# Patient Record
Sex: FEMALE | Race: WHITE | NOT HISPANIC OR LATINO | URBAN - NONMETROPOLITAN AREA
[De-identification: names, ages, dates, MRNs, and addresses within clinical notes are randomized per-mention and may not be internally consistent; named-entity substitution may affect disease eponyms.]

---

## 2023-03-06 PROBLEM — K11.7 XEROSTOMIA: Status: ACTIVE | Noted: 2023-03-06

## 2023-03-06 PROBLEM — F17.210 CIGARETTE NICOTINE DEPENDENCE: Status: ACTIVE | Noted: 2023-03-06

## 2023-03-06 PROBLEM — R43.9 DISTURBANCE OF SMELL AND TASTE: Status: ACTIVE | Noted: 2023-03-06

## 2023-03-06 PROBLEM — H61.21 IMPACTED CERUMEN OF RIGHT EAR: Status: ACTIVE | Noted: 2023-03-06

## 2023-03-06 PROBLEM — J32.9 SINUSITIS: Status: ACTIVE | Noted: 2023-03-06

## 2023-03-06 PROBLEM — K58.9 IRRITABLE BOWEL SYNDROME: Status: ACTIVE | Noted: 2023-03-06

## 2023-03-06 PROBLEM — R63.6 UNDERWEIGHT: Status: ACTIVE | Noted: 2023-03-06

## 2023-03-06 PROBLEM — E55.9 VITAMIN D DEFICIENCY: Status: ACTIVE | Noted: 2023-03-06

## 2023-03-06 PROBLEM — Q67.5 CONGENITAL SCOLIOSIS: Status: ACTIVE | Noted: 2023-03-06

## 2023-03-06 PROBLEM — K52.9 CHRONIC COLITIS: Status: ACTIVE | Noted: 2023-03-06

## 2023-03-06 PROBLEM — Q23.1: Status: ACTIVE | Noted: 2023-03-06

## 2023-03-06 PROBLEM — M54.2 CERVICALGIA: Status: ACTIVE | Noted: 2023-03-06

## 2023-03-06 PROBLEM — E78.2 COMBINED HYPERLIPIDEMIA: Status: ACTIVE | Noted: 2023-03-06

## 2023-03-06 PROBLEM — F40.243 ANXIETY WITH FLYING: Status: ACTIVE | Noted: 2023-03-06

## 2023-03-06 PROBLEM — J30.9 ALLERGIC RHINITIS: Status: ACTIVE | Noted: 2023-03-06

## 2023-03-06 PROBLEM — R92.8 ABNORMAL MAMMOGRAM: Status: ACTIVE | Noted: 2023-03-06

## 2023-03-06 PROBLEM — M41.9 SCOLIOSIS: Status: ACTIVE | Noted: 2023-03-06

## 2023-03-06 PROBLEM — K21.9 ESOPHAGEAL REFLUX: Status: ACTIVE | Noted: 2023-03-06

## 2023-03-06 PROBLEM — M81.0 OSTEOPOROSIS: Status: ACTIVE | Noted: 2023-03-06

## 2023-03-06 PROBLEM — U07.1 COVID-19: Status: ACTIVE | Noted: 2023-03-06

## 2023-03-06 PROBLEM — E87.6 HYPOKALEMIA: Status: ACTIVE | Noted: 2023-03-06

## 2023-03-06 PROBLEM — M54.50 LUMBAR PAIN: Status: ACTIVE | Noted: 2023-03-06

## 2023-03-06 PROBLEM — Q23.81 CONGENITAL BICUSPID AORTIC VALVE: Status: ACTIVE | Noted: 2023-03-06

## 2023-03-06 PROBLEM — I25.10 CAD (CORONARY ARTERY DISEASE): Status: ACTIVE | Noted: 2023-03-06

## 2023-03-06 PROBLEM — I10 ESSENTIAL HYPERTENSION: Status: ACTIVE | Noted: 2023-03-06

## 2023-03-06 PROBLEM — M54.6 THORACIC BACK PAIN: Status: ACTIVE | Noted: 2023-03-06

## 2023-03-06 PROBLEM — B37.0 CANDIDIASIS OF MOUTH: Status: ACTIVE | Noted: 2023-03-06

## 2023-03-06 RX ORDER — MONTELUKAST SODIUM 10 MG/1
1 TABLET ORAL NIGHTLY
COMMUNITY
Start: 2017-09-14 | End: 2023-09-28 | Stop reason: SDUPTHER

## 2023-03-06 RX ORDER — LISINOPRIL 10 MG/1
20 TABLET ORAL DAILY
COMMUNITY
Start: 2013-09-25 | End: 2023-04-03 | Stop reason: SDUPTHER

## 2023-03-06 RX ORDER — ATORVASTATIN CALCIUM 40 MG/1
1 TABLET, FILM COATED ORAL NIGHTLY
COMMUNITY
Start: 2017-10-23 | End: 2023-04-26 | Stop reason: SDUPTHER

## 2023-03-06 RX ORDER — MINERAL OIL
1 ENEMA (ML) RECTAL DAILY
COMMUNITY
Start: 2022-05-28

## 2023-03-06 RX ORDER — TIZANIDINE 2 MG/1
2 TABLET ORAL 2 TIMES DAILY PRN
COMMUNITY
End: 2023-03-23 | Stop reason: SDUPTHER

## 2023-03-06 RX ORDER — ALENDRONATE SODIUM 70 MG/1
1 TABLET ORAL
COMMUNITY
Start: 2018-11-09 | End: 2023-04-26 | Stop reason: SDUPTHER

## 2023-03-06 RX ORDER — CHOLECALCIFEROL (VITAMIN D3) 125 MCG
125 CAPSULE ORAL DAILY
COMMUNITY
End: 2023-07-31 | Stop reason: SDUPTHER

## 2023-03-06 RX ORDER — OMEPRAZOLE 40 MG/1
1 CAPSULE, DELAYED RELEASE ORAL DAILY
COMMUNITY
Start: 2018-11-09 | End: 2023-04-26 | Stop reason: SDUPTHER

## 2023-03-06 RX ORDER — IPRATROPIUM BROMIDE 42 UG/1
SPRAY, METERED NASAL
COMMUNITY
Start: 2021-06-12

## 2023-03-06 RX ORDER — AZELASTINE HYDROCHLORIDE 0.5 MG/ML
SOLUTION/ DROPS OPHTHALMIC
COMMUNITY
Start: 2021-06-12 | End: 2023-11-09 | Stop reason: WASHOUT

## 2023-03-06 RX ORDER — DICYCLOMINE HYDROCHLORIDE 10 MG/1
10 CAPSULE ORAL 2 TIMES DAILY
COMMUNITY
End: 2023-11-09 | Stop reason: WASHOUT

## 2023-03-06 RX ORDER — DIAZEPAM 2 MG/1
1-2 TABLET ORAL EVERY 8 HOURS PRN
COMMUNITY
Start: 2022-05-24 | End: 2023-11-09 | Stop reason: WASHOUT

## 2023-03-06 RX ORDER — HYDROCHLOROTHIAZIDE 12.5 MG/1
1 TABLET ORAL DAILY
COMMUNITY
Start: 2018-11-29 | End: 2023-04-26 | Stop reason: SDUPTHER

## 2023-03-06 RX ORDER — EPINEPHRINE 0.3 MG/.3ML
INJECTION SUBCUTANEOUS
COMMUNITY
Start: 2021-04-03 | End: 2024-05-02 | Stop reason: ALTCHOICE

## 2023-03-23 ENCOUNTER — OFFICE VISIT (OUTPATIENT)
Dept: PRIMARY CARE | Facility: CLINIC | Age: 68
End: 2023-03-23
Payer: MEDICARE

## 2023-03-23 VITALS
WEIGHT: 100.7 LBS | DIASTOLIC BLOOD PRESSURE: 84 MMHG | BODY MASS INDEX: 19.03 KG/M2 | HEART RATE: 95 BPM | TEMPERATURE: 96.8 F | SYSTOLIC BLOOD PRESSURE: 144 MMHG | OXYGEN SATURATION: 95 %

## 2023-03-23 DIAGNOSIS — M54.2 CERVICALGIA: ICD-10-CM

## 2023-03-23 DIAGNOSIS — I10 ESSENTIAL HYPERTENSION: Primary | ICD-10-CM

## 2023-03-23 DIAGNOSIS — M54.6 CHRONIC THORACIC BACK PAIN, UNSPECIFIED BACK PAIN LATERALITY: ICD-10-CM

## 2023-03-23 DIAGNOSIS — G89.29 CHRONIC THORACIC BACK PAIN, UNSPECIFIED BACK PAIN LATERALITY: ICD-10-CM

## 2023-03-23 DIAGNOSIS — M54.50 LUMBAR PAIN: ICD-10-CM

## 2023-03-23 PROBLEM — B37.0 CANDIDIASIS OF MOUTH: Status: RESOLVED | Noted: 2023-03-06 | Resolved: 2023-03-23

## 2023-03-23 PROBLEM — R92.8 ABNORMAL MAMMOGRAM: Status: RESOLVED | Noted: 2023-03-06 | Resolved: 2023-03-23

## 2023-03-23 PROBLEM — U07.1 COVID-19: Status: RESOLVED | Noted: 2023-03-06 | Resolved: 2023-03-23

## 2023-03-23 PROBLEM — H25.10 NUCLEAR SCLEROTIC CATARACT: Status: ACTIVE | Noted: 2017-03-03

## 2023-03-23 PROBLEM — F40.243 ANXIETY WITH FLYING: Status: RESOLVED | Noted: 2023-03-06 | Resolved: 2023-03-23

## 2023-03-23 PROBLEM — H61.21 IMPACTED CERUMEN OF RIGHT EAR: Status: RESOLVED | Noted: 2023-03-06 | Resolved: 2023-03-23

## 2023-03-23 PROCEDURE — 3079F DIAST BP 80-89 MM HG: CPT | Performed by: NURSE PRACTITIONER

## 2023-03-23 PROCEDURE — 1160F RVW MEDS BY RX/DR IN RCRD: CPT | Performed by: NURSE PRACTITIONER

## 2023-03-23 PROCEDURE — 99214 OFFICE O/P EST MOD 30 MIN: CPT | Performed by: NURSE PRACTITIONER

## 2023-03-23 PROCEDURE — 3077F SYST BP >= 140 MM HG: CPT | Performed by: NURSE PRACTITIONER

## 2023-03-23 PROCEDURE — 1036F TOBACCO NON-USER: CPT | Performed by: NURSE PRACTITIONER

## 2023-03-23 PROCEDURE — 1159F MED LIST DOCD IN RCRD: CPT | Performed by: NURSE PRACTITIONER

## 2023-03-23 RX ORDER — AMLODIPINE BESYLATE 2.5 MG/1
2.5 TABLET ORAL DAILY
Qty: 90 TABLET | Refills: 1 | Status: SHIPPED | OUTPATIENT
Start: 2023-03-23 | End: 2023-08-17 | Stop reason: SDUPTHER

## 2023-03-23 RX ORDER — TIZANIDINE 2 MG/1
4 TABLET ORAL EVERY 8 HOURS PRN
Qty: 90 TABLET | Refills: 0 | Status: SHIPPED | OUTPATIENT
Start: 2023-03-23 | End: 2023-04-17 | Stop reason: SDUPTHER

## 2023-03-23 ASSESSMENT — ENCOUNTER SYMPTOMS
VOMITING: 0
EYES NEGATIVE: 1
SINUS PRESSURE: 0
SINUS PAIN: 0
SORE THROAT: 0
ACTIVITY CHANGE: 0
NERVOUS/ANXIOUS: 1
CHILLS: 0
PALPITATIONS: 0
WHEEZING: 0
NUMBNESS: 0
CONSTIPATION: 0
SHORTNESS OF BREATH: 0
DIARRHEA: 0
HEMATOLOGIC/LYMPHATIC NEGATIVE: 1
NAUSEA: 0
WEAKNESS: 0
HYPERTENSION: 1
ABDOMINAL DISTENTION: 0
LIGHT-HEADEDNESS: 0
ENDOCRINE NEGATIVE: 1
ALLERGIC/IMMUNOLOGIC NEGATIVE: 1
ABDOMINAL PAIN: 0
BACK PAIN: 1
HEADACHES: 0
ARTHRALGIAS: 1
FEVER: 0
COUGH: 0
DIZZINESS: 0
CHEST TIGHTNESS: 0
RHINORRHEA: 0
FATIGUE: 0

## 2023-03-23 NOTE — ASSESSMENT & PLAN NOTE
Controlled. Continue current medications.  -Low fat/low cholesterol, low sodium, carbohydrate controlled diet  -Exercise as tolerated 150 minutes/week, increase activity gradually  -Maintain healthy weight

## 2023-03-23 NOTE — PROGRESS NOTES
Subjective   Patient ID: Dinora Elias is a 68 y.o. female who presents for Hypertension (Especially in mornings).    Chronic neck, thoracic and lumbar back pain. Currently going to physical therapy. Having some improvement but still having quite a bit of pain. Referral to pain management.    Hypertension  This is a chronic problem. The current episode started more than 1 year ago. The problem has been gradually worsening since onset. The problem is uncontrolled. Pertinent negatives include no chest pain, headaches, palpitations or shortness of breath. Risk factors for coronary artery disease include dyslipidemia and smoking/tobacco exposure. Past treatments include ACE inhibitors and diuretics. The current treatment provides moderate improvement.        Review of Systems   Constitutional:  Negative for activity change, chills, fatigue and fever.   HENT:  Positive for hearing loss. Negative for congestion, rhinorrhea, sinus pressure, sinus pain and sore throat.    Eyes: Negative.    Respiratory:  Negative for cough, chest tightness, shortness of breath and wheezing.    Cardiovascular:  Negative for chest pain, palpitations and leg swelling.   Gastrointestinal:  Negative for abdominal distention, abdominal pain, constipation, diarrhea, nausea and vomiting.   Endocrine: Negative.    Genitourinary: Negative.    Musculoskeletal:  Positive for arthralgias and back pain.   Skin: Negative.    Allergic/Immunologic: Negative.    Neurological:  Negative for dizziness, syncope, weakness, light-headedness, numbness and headaches.   Hematological: Negative.    Psychiatric/Behavioral:  The patient is nervous/anxious.    All other systems reviewed and are negative.      Objective   /84   Pulse 95   Temp 36 °C (96.8 °F)   Wt 45.7 kg (100 lb 11.2 oz)   SpO2 95%   BMI 19.03 kg/m²     Physical Exam  Vitals and nursing note reviewed.   Constitutional:       General: She is not in acute distress.     Appearance: Normal  appearance. She is not ill-appearing.   HENT:      Head: Normocephalic and atraumatic.      Right Ear: Tympanic membrane, ear canal and external ear normal.      Left Ear: Tympanic membrane, ear canal and external ear normal.      Nose: Nose normal.      Mouth/Throat:      Mouth: Mucous membranes are moist.      Pharynx: Oropharynx is clear.   Eyes:      Pupils: Pupils are equal, round, and reactive to light.   Cardiovascular:      Rate and Rhythm: Normal rate and regular rhythm.      Pulses: Normal pulses.      Heart sounds: Normal heart sounds. No murmur heard.  Pulmonary:      Effort: Pulmonary effort is normal. No respiratory distress.      Breath sounds: Normal breath sounds. No wheezing.   Abdominal:      General: Bowel sounds are normal. There is no distension.      Palpations: Abdomen is soft.      Tenderness: There is no abdominal tenderness.   Musculoskeletal:         General: No tenderness. Normal range of motion.      Cervical back: Normal range of motion and neck supple.      Right lower leg: No edema.      Left lower leg: No edema.   Skin:     General: Skin is warm and dry.      Capillary Refill: Capillary refill takes less than 2 seconds.      Coloration: Skin is not jaundiced.   Neurological:      General: No focal deficit present.      Mental Status: She is alert and oriented to person, place, and time.      Motor: No weakness.   Psychiatric:         Mood and Affect: Mood normal.         Behavior: Behavior normal.         Thought Content: Thought content normal.         Judgment: Judgment normal.         Assessment/Plan   Problem List Items Addressed This Visit          Nervous    Cervicalgia     Continue PT         Relevant Medications    tiZANidine (Zanaflex) 2 mg tablet    Other Relevant Orders    Referral to Pain Medicine       Circulatory    Essential hypertension - Primary     Controlled. Continue current medications.  -Low fat/low cholesterol, low sodium, carbohydrate controlled  diet  -Exercise as tolerated 150 minutes/week, increase activity gradually  -Maintain healthy weight           Relevant Medications    amLODIPine (Norvasc) 2.5 mg tablet       Other    Lumbar pain     Continue PT         Relevant Orders    Referral to Pain Medicine    Thoracic back pain     Continue PT         Relevant Orders    Referral to Pain Medicine

## 2023-04-03 DIAGNOSIS — I10 ESSENTIAL HYPERTENSION: Primary | ICD-10-CM

## 2023-04-03 RX ORDER — LISINOPRIL 20 MG/1
20 TABLET ORAL DAILY
Qty: 90 TABLET | Refills: 1 | Status: SHIPPED | OUTPATIENT
Start: 2023-04-03 | End: 2023-05-22 | Stop reason: DRUGHIGH

## 2023-04-17 DIAGNOSIS — M54.2 CERVICALGIA: ICD-10-CM

## 2023-04-17 RX ORDER — TIZANIDINE 2 MG/1
4 TABLET ORAL EVERY 8 HOURS PRN
Qty: 90 TABLET | Refills: 0 | Status: SHIPPED | OUTPATIENT
Start: 2023-04-17 | End: 2023-05-10 | Stop reason: SDUPTHER

## 2023-04-26 DIAGNOSIS — I10 ESSENTIAL HYPERTENSION: ICD-10-CM

## 2023-04-26 DIAGNOSIS — K21.9 GASTROESOPHAGEAL REFLUX DISEASE, UNSPECIFIED WHETHER ESOPHAGITIS PRESENT: ICD-10-CM

## 2023-04-26 DIAGNOSIS — M80.00XD OSTEOPOROSIS WITH CURRENT PATHOLOGICAL FRACTURE WITH ROUTINE HEALING, UNSPECIFIED OSTEOPOROSIS TYPE, SUBSEQUENT ENCOUNTER: ICD-10-CM

## 2023-04-26 DIAGNOSIS — E78.2 COMBINED HYPERLIPIDEMIA: ICD-10-CM

## 2023-04-26 RX ORDER — FLUTICASONE FUROATE, UMECLIDINIUM BROMIDE AND VILANTEROL TRIFENATATE 100; 62.5; 25 UG/1; UG/1; UG/1
1 POWDER RESPIRATORY (INHALATION) DAILY
COMMUNITY
Start: 2023-04-10

## 2023-04-26 RX ORDER — HYDROCHLOROTHIAZIDE 12.5 MG/1
12.5 TABLET ORAL DAILY
Qty: 90 TABLET | Refills: 0 | Status: SHIPPED | OUTPATIENT
Start: 2023-04-26 | End: 2023-08-17 | Stop reason: SDUPTHER

## 2023-04-26 RX ORDER — ATORVASTATIN CALCIUM 40 MG/1
40 TABLET, FILM COATED ORAL NIGHTLY
Qty: 90 TABLET | Refills: 0 | Status: SHIPPED | OUTPATIENT
Start: 2023-04-26 | End: 2023-09-05 | Stop reason: SDUPTHER

## 2023-04-26 RX ORDER — ALENDRONATE SODIUM 70 MG/1
70 TABLET ORAL
Qty: 12 TABLET | Refills: 0 | Status: SHIPPED | OUTPATIENT
Start: 2023-04-26 | End: 2023-08-09 | Stop reason: ALTCHOICE

## 2023-04-26 RX ORDER — OMEPRAZOLE 40 MG/1
40 CAPSULE, DELAYED RELEASE ORAL DAILY
Qty: 90 CAPSULE | Refills: 0 | Status: SHIPPED | OUTPATIENT
Start: 2023-04-26 | End: 2023-08-17 | Stop reason: SDUPTHER

## 2023-05-09 ENCOUNTER — OFFICE VISIT (OUTPATIENT)
Dept: PRIMARY CARE | Facility: CLINIC | Age: 68
End: 2023-05-09
Payer: MEDICARE

## 2023-05-09 VITALS
HEART RATE: 66 BPM | OXYGEN SATURATION: 98 % | BODY MASS INDEX: 19.27 KG/M2 | DIASTOLIC BLOOD PRESSURE: 80 MMHG | TEMPERATURE: 96 F | WEIGHT: 102 LBS | SYSTOLIC BLOOD PRESSURE: 128 MMHG

## 2023-05-09 DIAGNOSIS — M54.2 CERVICALGIA: Primary | ICD-10-CM

## 2023-05-09 DIAGNOSIS — M50.30 DDD (DEGENERATIVE DISC DISEASE), CERVICAL: ICD-10-CM

## 2023-05-09 DIAGNOSIS — M54.50 LUMBAR PAIN: ICD-10-CM

## 2023-05-09 DIAGNOSIS — M51.36 DEGENERATIVE LUMBAR DISC: ICD-10-CM

## 2023-05-09 DIAGNOSIS — M81.6 LOCALIZED OSTEOPOROSIS WITHOUT CURRENT PATHOLOGICAL FRACTURE: ICD-10-CM

## 2023-05-09 DIAGNOSIS — I25.10 CORONARY ARTERY DISEASE INVOLVING NATIVE HEART WITHOUT ANGINA PECTORIS, UNSPECIFIED VESSEL OR LESION TYPE: ICD-10-CM

## 2023-05-09 DIAGNOSIS — I10 ESSENTIAL HYPERTENSION: ICD-10-CM

## 2023-05-09 PROCEDURE — 1160F RVW MEDS BY RX/DR IN RCRD: CPT | Performed by: NURSE PRACTITIONER

## 2023-05-09 PROCEDURE — 99214 OFFICE O/P EST MOD 30 MIN: CPT | Performed by: NURSE PRACTITIONER

## 2023-05-09 PROCEDURE — 1159F MED LIST DOCD IN RCRD: CPT | Performed by: NURSE PRACTITIONER

## 2023-05-09 PROCEDURE — 3079F DIAST BP 80-89 MM HG: CPT | Performed by: NURSE PRACTITIONER

## 2023-05-09 PROCEDURE — 1036F TOBACCO NON-USER: CPT | Performed by: NURSE PRACTITIONER

## 2023-05-09 PROCEDURE — 3074F SYST BP LT 130 MM HG: CPT | Performed by: NURSE PRACTITIONER

## 2023-05-09 NOTE — PROGRESS NOTES
Subjective   Patient ID: Dinora Elias is a 68 y.o. female who presents for Hypertension and Hyperlipidemia.    Chronic sinusitis, following with ENT.   Xerostomia, improved. Using Biotene, tongue scraper, following with dentist and ENT. Recommend increase fluid intake, stay well-hydrated. Using dicyclomine rarely. Quit smoking.   Allergic rhinitis, following with allergist. Now taking Allegra and Singulair, using ipratropium nasal solution. Receiving allergy shots. She thinks they are helping.  Lung nodules stable, repeat LDCT 2024  Osteopenia and osteoporosis improving per last DEXA scan, continue alendronate 70 mg weekly.  CAD, continue atorvastatin 40 mg daily, aspirin, good blood pressure control. CT chest shows severe coronary calcifications. Normal stress test 2018. CT cardiac score 1134. Following with cardiology. Stress test ordered next week.  Hypertension, controlled. Taking lisinopril 10 mg daily and hydrochlorothiazide 12.5 mg daily.  Chronic neck and back pain. Minimal improvement with physical therapy. Get MRI C-spine and lumbar spine.    Preventive:  CRC screen:   Mammogram: 2022 negative  DEXA scan:   CT cardiac scoring: 3/2023 score 1134  Stress test: 2018 normal  LDCT lung cancer screenin2023         Review of Systems   Constitutional:  Negative for activity change, chills, fatigue and fever.   HENT:  Negative for congestion, rhinorrhea, sinus pressure, sinus pain and sore throat.         Dry mouth   Eyes: Negative.    Respiratory:  Negative for cough, chest tightness, shortness of breath and wheezing.    Cardiovascular:  Negative for chest pain, palpitations and leg swelling.   Gastrointestinal:  Negative for abdominal distention, abdominal pain, constipation, diarrhea, nausea and vomiting.   Endocrine: Negative.    Genitourinary: Negative.    Musculoskeletal:  Positive for back pain and neck pain.   Skin: Negative.    Allergic/Immunologic: Negative.    Neurological:  Negative  for dizziness, syncope, weakness, light-headedness, numbness and headaches.   Hematological: Negative.    Psychiatric/Behavioral: Negative.     All other systems reviewed and are negative.      Objective   /80   Pulse 66   Temp 35.6 °C (96 °F)   Wt 46.3 kg (102 lb)   SpO2 98%   BMI 19.27 kg/m²     Physical Exam  Vitals and nursing note reviewed.   Constitutional:       General: She is not in acute distress.     Appearance: Normal appearance. She is not ill-appearing.   HENT:      Head: Normocephalic and atraumatic.      Right Ear: Tympanic membrane, ear canal and external ear normal.      Left Ear: Tympanic membrane, ear canal and external ear normal.      Nose: Nose normal.      Mouth/Throat:      Mouth: Mucous membranes are dry.      Pharynx: Oropharynx is clear.   Eyes:      Pupils: Pupils are equal, round, and reactive to light.   Cardiovascular:      Rate and Rhythm: Normal rate and regular rhythm.      Pulses: Normal pulses.      Heart sounds: Normal heart sounds. No murmur heard.  Pulmonary:      Effort: Pulmonary effort is normal. No respiratory distress.      Breath sounds: Normal breath sounds. No wheezing.   Abdominal:      General: Bowel sounds are normal. There is no distension.      Palpations: Abdomen is soft.      Tenderness: There is no abdominal tenderness.   Musculoskeletal:         General: No tenderness. Normal range of motion.      Cervical back: Normal range of motion and neck supple.      Right lower leg: No edema.      Left lower leg: No edema.   Skin:     General: Skin is warm and dry.      Capillary Refill: Capillary refill takes less than 2 seconds.      Coloration: Skin is not jaundiced.   Neurological:      General: No focal deficit present.      Mental Status: She is alert and oriented to person, place, and time.      Motor: No weakness.   Psychiatric:         Mood and Affect: Mood normal.         Behavior: Behavior normal.         Thought Content: Thought content normal.          Judgment: Judgment normal.         Assessment/Plan   Problem List Items Addressed This Visit          Nervous    Cervicalgia - Primary    Relevant Orders    MR cervical spine wo IV contrast    Follow Up In Primary Care       Circulatory    Essential hypertension     Controlled. Continue current medications.  -Low fat/cholesterol, low sodium, low carbohydrate diet  -Exercise as tolerated 150 minutes/week, increase activity slowly  -Maintain healthy weight         Relevant Orders    Follow Up In Primary Care    CAD (coronary artery disease)     Stress test scheduled next week  Follow-up with cardiology  Controlled. Continue current medications.  -Low fat/low cholesterol diet  -Eat more fresh foods  -Avoid processed/prepackaged/fast foods  -Gradually increase physical activity            Musculoskeletal    Osteoporosis    Relevant Orders    XR DEXA bone density       Other    Lumbar pain    Relevant Orders    MR lumbar spine wo IV contrast    Follow Up In Primary Care     Other Visit Diagnoses       Degenerative lumbar disc        Relevant Orders    MR lumbar spine wo IV contrast    DDD (degenerative disc disease), cervical        Relevant Orders    MR cervical spine wo IV contrast

## 2023-05-10 DIAGNOSIS — M54.2 CERVICALGIA: ICD-10-CM

## 2023-05-10 RX ORDER — TIZANIDINE 2 MG/1
TABLET ORAL
Qty: 90 TABLET | Refills: 0 | Status: SHIPPED | OUTPATIENT
Start: 2023-05-10 | End: 2023-06-02 | Stop reason: SDUPTHER

## 2023-05-11 ASSESSMENT — ENCOUNTER SYMPTOMS
NECK PAIN: 1
SINUS PAIN: 0
NAUSEA: 0
ENDOCRINE NEGATIVE: 1
LIGHT-HEADEDNESS: 0
PSYCHIATRIC NEGATIVE: 1
CHEST TIGHTNESS: 0
WEAKNESS: 0
CHILLS: 0
ABDOMINAL DISTENTION: 0
HEMATOLOGIC/LYMPHATIC NEGATIVE: 1
VOMITING: 0
SHORTNESS OF BREATH: 0
SINUS PRESSURE: 0
NUMBNESS: 0
COUGH: 0
RHINORRHEA: 0
DIZZINESS: 0
ACTIVITY CHANGE: 0
ALLERGIC/IMMUNOLOGIC NEGATIVE: 1
HEADACHES: 0
PALPITATIONS: 0
ABDOMINAL PAIN: 0
SORE THROAT: 0
BACK PAIN: 1
EYES NEGATIVE: 1
FATIGUE: 0
DIARRHEA: 0
WHEEZING: 0
CONSTIPATION: 0
FEVER: 0

## 2023-05-11 NOTE — ASSESSMENT & PLAN NOTE
Controlled. Continue current medications.  -Low fat/cholesterol, low sodium, low carbohydrate diet  -Exercise as tolerated 150 minutes/week, increase activity slowly  -Maintain healthy weight

## 2023-05-11 NOTE — ASSESSMENT & PLAN NOTE
Stress test scheduled next week  Follow-up with cardiology  Controlled. Continue current medications.  -Low fat/low cholesterol diet  -Eat more fresh foods  -Avoid processed/prepackaged/fast foods  -Gradually increase physical activity

## 2023-05-22 DIAGNOSIS — I10 ESSENTIAL HYPERTENSION: ICD-10-CM

## 2023-05-22 RX ORDER — LISINOPRIL 20 MG/1
20 TABLET ORAL DAILY
Qty: 90 TABLET | Refills: 0 | Status: SHIPPED | OUTPATIENT
Start: 2023-05-22 | End: 2023-08-17 | Stop reason: SDUPTHER

## 2023-06-02 ENCOUNTER — TELEPHONE (OUTPATIENT)
Dept: PRIMARY CARE | Facility: CLINIC | Age: 68
End: 2023-06-02
Payer: MEDICARE

## 2023-06-02 DIAGNOSIS — M47.16 DEGENERATIVE ARTHRITIS OF LUMBAR SPINE WITH CORD COMPRESSION: ICD-10-CM

## 2023-06-02 DIAGNOSIS — M81.0 OSTEOPOROSIS, UNSPECIFIED OSTEOPOROSIS TYPE, UNSPECIFIED PATHOLOGICAL FRACTURE PRESENCE: Primary | ICD-10-CM

## 2023-06-02 DIAGNOSIS — M47.22 OSTEOARTHRITIS OF SPINE WITH RADICULOPATHY, CERVICAL REGION: ICD-10-CM

## 2023-06-02 DIAGNOSIS — M54.2 CERVICALGIA: ICD-10-CM

## 2023-06-02 RX ORDER — TIZANIDINE 2 MG/1
2 TABLET ORAL EVERY 8 HOURS PRN
Qty: 90 TABLET | Refills: 0 | Status: SHIPPED | OUTPATIENT
Start: 2023-06-02 | End: 2023-06-23 | Stop reason: SDUPTHER

## 2023-06-02 NOTE — TELEPHONE ENCOUNTER
Result Communication    Resulted Orders   XR DEXA bone density axial skeleton w VFA    Narrative    Interpreted By:  KAYKAY DENNIS MD  MRN: [id number]  Patient Name: JEFFREY MCKNIGHT     STUDY:  DXA BONE DENSITY AXIAL SKELETON W/IVA5/24/2023 1:20 pm     INDICATION:  screening ? [id number].  The patient is a 69 y/o  year old F.     COMPARISON:  None.     ACCESSION NUMBER(S):  [id number]     ORDERING CLINICIAN:  ERICA URBINA     TECHNIQUE:  DXA BONE DENSITY AXIAL SKELETON W/CRYSTAL     FINDINGS:  SPINE L1-L4  Bone Mineral Density: 1.034 g/cm2  T-Score -0.1  Z-Score 2  Classification:  Normal  Bone Mineral Density change vs baseline: 24.6%  Bone Mineral Density change vs previous: 6.8%     LEFT FEMUR -TOTAL  Bone Mineral Density: 0.628 g/cm2  T-Score -2.6   Z-Score  -1.1  Classification:  Osteoporosis  Bone Mineral Density change vs baseline: 1.5 %  Bone Mineral Density change vs previous: -2.7 %     LEFT FEMUR -NECK  Bone Mineral Density: 0.51 g/cm2  T-Score -3.1  Z-Score -1.2  Classification:  Osteoporosis           World Health Organization (WHO) criteria for post-menopausal,   Women:  Normal:         T-score at or above -1 SD  Osteopenia:   T-score between -1 and -2.5 SD  Osteoporosis: T-score at or below -2.5 SD        10-year Fracture Risk:  Major Osteoporotic Fracture  Not reported  Hip Fracture                        Not reported     Note:  If no FRAX score is reported, it is because:  Some T-score for Spine Total or Hip Total or Femoral Neck at or below  -2.5     Vertebral Deformity Assessment: Exam Date - 5/24/2023 1:20 pm  -----------------------------------------------------------------  Vertebral Level                      Impression  -----------------------------------------------------------------  T4                                        Moderate Biconcave Deformity  T5                                        Mild Biconcave Deformity  T6                                        Moderate Biconcave  Deformity  T7                                        Normal  T8                                        Normal  T9                                        Moderate Biconcave Deformity  T10                                      Moderate Biconcave Deformity  T11                                      Mild Biconcave Deformity  T12                                      Normal  L1                                        Normal  L2                                        Mild Biconcave Deformity  L3                                        Moderate Wedge Deformity  L4                                        Normal  -----------------------------------------------------------------  A spine fracture indicates 5X risk for subsequent spine fracture  and 2X risk for subsequent hip fracture.     This exam was performed at [facility] on a [facility] Dexa Unit.       Impression    DEXA:  Spine:  Classification:  Within normal limits        LEFT FEMUR -NECK  Classification:  Osteoporosis           VFA:  NO VERTEBRAL FRACTURES WERE SEEN.     All images and detailed analysis are available on the  Radiology  PACS.       11:44 AM      Results were successfully communicated with the patient and they acknowledged their understanding. Referred to rheumatology.

## 2023-06-23 DIAGNOSIS — M54.2 CERVICALGIA: ICD-10-CM

## 2023-06-23 RX ORDER — TIZANIDINE 2 MG/1
2 TABLET ORAL EVERY 8 HOURS PRN
Qty: 90 TABLET | Refills: 0 | Status: SHIPPED | OUTPATIENT
Start: 2023-06-23 | End: 2023-07-31 | Stop reason: SDUPTHER

## 2023-07-31 DIAGNOSIS — M54.2 CERVICALGIA: ICD-10-CM

## 2023-07-31 DIAGNOSIS — E55.9 VITAMIN D DEFICIENCY: ICD-10-CM

## 2023-07-31 RX ORDER — TIZANIDINE 2 MG/1
2 TABLET ORAL EVERY 8 HOURS PRN
Qty: 90 TABLET | Refills: 0 | Status: SHIPPED | OUTPATIENT
Start: 2023-07-31 | End: 2023-09-05 | Stop reason: SDUPTHER

## 2023-07-31 RX ORDER — CHOLECALCIFEROL (VITAMIN D3) 125 MCG
125 CAPSULE ORAL DAILY
Qty: 90 CAPSULE | Refills: 0 | Status: SHIPPED | OUTPATIENT
Start: 2023-07-31 | End: 2023-11-10

## 2023-08-09 ENCOUNTER — OFFICE VISIT (OUTPATIENT)
Dept: PRIMARY CARE | Facility: CLINIC | Age: 68
End: 2023-08-09
Payer: MEDICARE

## 2023-08-09 VITALS
TEMPERATURE: 97.9 F | DIASTOLIC BLOOD PRESSURE: 74 MMHG | WEIGHT: 102.3 LBS | BODY MASS INDEX: 19.33 KG/M2 | HEART RATE: 86 BPM | SYSTOLIC BLOOD PRESSURE: 122 MMHG | OXYGEN SATURATION: 98 %

## 2023-08-09 DIAGNOSIS — I25.10 CORONARY ARTERY DISEASE INVOLVING NATIVE CORONARY ARTERY OF NATIVE HEART WITHOUT ANGINA PECTORIS: ICD-10-CM

## 2023-08-09 DIAGNOSIS — M81.0 OSTEOPOROSIS, UNSPECIFIED OSTEOPOROSIS TYPE, UNSPECIFIED PATHOLOGICAL FRACTURE PRESENCE: ICD-10-CM

## 2023-08-09 DIAGNOSIS — M54.50 LUMBAR PAIN: ICD-10-CM

## 2023-08-09 DIAGNOSIS — M54.2 CERVICALGIA: Primary | ICD-10-CM

## 2023-08-09 DIAGNOSIS — I10 ESSENTIAL HYPERTENSION: ICD-10-CM

## 2023-08-09 DIAGNOSIS — E78.2 COMBINED HYPERLIPIDEMIA: ICD-10-CM

## 2023-08-09 PROCEDURE — 1036F TOBACCO NON-USER: CPT | Performed by: NURSE PRACTITIONER

## 2023-08-09 PROCEDURE — 1159F MED LIST DOCD IN RCRD: CPT | Performed by: NURSE PRACTITIONER

## 2023-08-09 PROCEDURE — 3078F DIAST BP <80 MM HG: CPT | Performed by: NURSE PRACTITIONER

## 2023-08-09 PROCEDURE — 3074F SYST BP LT 130 MM HG: CPT | Performed by: NURSE PRACTITIONER

## 2023-08-09 PROCEDURE — 99214 OFFICE O/P EST MOD 30 MIN: CPT | Performed by: NURSE PRACTITIONER

## 2023-08-09 PROCEDURE — 1160F RVW MEDS BY RX/DR IN RCRD: CPT | Performed by: NURSE PRACTITIONER

## 2023-08-09 RX ORDER — GABAPENTIN 100 MG/1
CAPSULE ORAL
Qty: 90 CAPSULE | Refills: 0 | Status: SHIPPED | OUTPATIENT
Start: 2023-08-09 | End: 2023-09-07

## 2023-08-09 NOTE — PROGRESS NOTES
Subjective   Patient ID: Dinora Elias is a 68 y.o. female who presents for 3 month medical management.    Severe degenerative disc disease C-spine and lumbar spine.  No improvement with PT.  Upcoming appointment with neurosurgery.  Start gabapentin.  Chronic sinusitis, following with ENT.   Xerostomia, improved. Using Biotene, tongue scraper, following with dentist and ENT. Recommend increase fluid intake, stay well-hydrated. Using dicyclomine rarely. Quit smoking.   Allergic rhinitis, following with allergist. Now taking Allegra and Singulair, using ipratropium nasal solution. Receiving allergy shots. She thinks they are helping.  Lung nodules stable, repeat LDCT 2024  Osteopenia and osteoporosis, following with rheumatology, alendronate stopped and Prolia started by rheumatology.  CAD, continue atorvastatin 40 mg daily, aspirin, good blood pressure control. CT chest shows severe coronary calcifications. Normal stress test 2023. CT cardiac score 1134. Following with cardiology.   The 10-year ASCVD risk score (Benji DK, et al., 2019) is: 10%    Values used to calculate the score:      Age: 68 years      Sex: Female      Is Non- : No      Diabetic: No      Tobacco smoker: No      Systolic Blood Pressure: 122 mmHg      Is BP treated: Yes      HDL Cholesterol: 96 mg/dL      Total Cholesterol: 180 mg/dL  Hypertension, controlled. Taking lisinopril 10 mg daily and hydrochlorothiazide 12.5 mg daily.    Preventive:  CRC screen:   Mammogram: 2022 negative  DEXA scan: 2023 osteoporosis  CT cardiac scoring: 3/2023 score 1134  Stress test:  normal  LDCT lung cancer screenin2023    === 23 ===    MR LUMBAR SPINE WO CONTRAST    - Impression -  S shaped scoliosis of the lumbar spine with multilevel degenerative  disc disease and facet arthrosis. There is severe spinal canal and  severe left neural foraminal stenosis at L4-L5.       Review of Systems   Constitutional:   Negative for activity change, chills, fatigue and fever.   HENT:  Negative for congestion, rhinorrhea, sinus pressure, sinus pain and sore throat.         Dry mouth   Eyes: Negative.    Respiratory:  Negative for cough, chest tightness, shortness of breath and wheezing.    Cardiovascular:  Negative for chest pain, palpitations and leg swelling.   Gastrointestinal:  Negative for abdominal distention, abdominal pain, constipation, diarrhea, nausea and vomiting.   Endocrine: Negative.    Genitourinary: Negative.    Musculoskeletal:  Positive for back pain and neck pain.   Skin: Negative.    Allergic/Immunologic: Negative.    Neurological:  Negative for dizziness, syncope, weakness, light-headedness, numbness and headaches.   Hematological: Negative.    Psychiatric/Behavioral: Negative.     All other systems reviewed and are negative.      Objective   /74   Pulse 86   Temp 36.6 °C (97.9 °F)   Wt 46.4 kg (102 lb 4.8 oz)   SpO2 98%   BMI 19.33 kg/m²     Physical Exam  Vitals and nursing note reviewed.   Constitutional:       General: She is not in acute distress.     Appearance: Normal appearance. She is not ill-appearing.   HENT:      Head: Normocephalic and atraumatic.      Right Ear: Tympanic membrane, ear canal and external ear normal.      Left Ear: Tympanic membrane, ear canal and external ear normal.      Nose: Nose normal.      Mouth/Throat:      Mouth: Mucous membranes are dry.      Pharynx: Oropharynx is clear.   Eyes:      Pupils: Pupils are equal, round, and reactive to light.   Cardiovascular:      Rate and Rhythm: Normal rate and regular rhythm.      Pulses: Normal pulses.      Heart sounds: Normal heart sounds. No murmur heard.  Pulmonary:      Effort: Pulmonary effort is normal. No respiratory distress.      Breath sounds: Normal breath sounds. No wheezing.   Abdominal:      General: Bowel sounds are normal. There is no distension.      Palpations: Abdomen is soft.      Tenderness: There is no  "abdominal tenderness.   Musculoskeletal:         General: No tenderness. Normal range of motion.      Cervical back: Normal range of motion and neck supple.      Right lower leg: No edema.      Left lower leg: No edema.   Skin:     General: Skin is warm and dry.      Capillary Refill: Capillary refill takes less than 2 seconds.      Coloration: Skin is not jaundiced.   Neurological:      General: No focal deficit present.      Mental Status: She is alert and oriented to person, place, and time.      Motor: No weakness.   Psychiatric:         Mood and Affect: Mood normal.         Behavior: Behavior normal.         Thought Content: Thought content normal.         Judgment: Judgment normal.       Assessment/Plan     #Lumbar and c-spine DDD  -Upcoming appointment with neurosurgery  -Start gabapentin  #Hypertension, controlled  -Continue amlodipine 2.5 mg daily, lisinopril 20 mg daily, HCTZ 12.5 mg daily  -Low fat/cholesterol, low sodium, low carbohydrate diet  -Exercise as tolerated 150 minutes/week, increase activity slowly  -Maintain healthy weight  #CAD, hyperlipidemia  -BP control  -Continue atorvastatin 40 mg daily  -Low fat/low cholesterol diet  -Eat more fresh foods  -Avoid processed/prepackaged/fast foods  -Gradually increase physical activity  -Weight loss  #Osteoporosis  -Prolia per rheumatology  -Follow-up with rheumatology  #Xerostomia, not controlled  -Stay well-hydrated  -Continue over-the-counter Biotene or other dry mouth product  #GERD  -Continue omeprazole 40 mg daily  -Avoid alcohol, caffeine, and other foods that tend to bother your stomach  -Elevate head of bed 4-6\" on blocks  -Avoid eating 2 hours prior to bed  -Do not wear constricting clothing around your middle  #IBS  -Bentyl 20 mg every 6 hours only as needed  #Former smoker  -Low dose CT for lung cancer screening due 1/2024  #Allergic rhinitis  -Follow with allergy/immunology  -Continue Singulair, Allegra, ipratropium  #Chronic " sinusitis  -Follow with ENT    Follow-up 3 months and as needed

## 2023-08-17 DIAGNOSIS — K21.9 GASTROESOPHAGEAL REFLUX DISEASE, UNSPECIFIED WHETHER ESOPHAGITIS PRESENT: ICD-10-CM

## 2023-08-17 DIAGNOSIS — I10 ESSENTIAL HYPERTENSION: ICD-10-CM

## 2023-08-17 RX ORDER — HYDROCHLOROTHIAZIDE 12.5 MG/1
12.5 TABLET ORAL DAILY
Qty: 90 TABLET | Refills: 0 | Status: SHIPPED | OUTPATIENT
Start: 2023-08-17 | End: 2023-10-30

## 2023-08-17 RX ORDER — OMEPRAZOLE 40 MG/1
40 CAPSULE, DELAYED RELEASE ORAL DAILY
Qty: 90 CAPSULE | Refills: 0 | Status: SHIPPED | OUTPATIENT
Start: 2023-08-17 | End: 2023-10-30

## 2023-08-17 RX ORDER — LISINOPRIL 20 MG/1
20 TABLET ORAL DAILY
Qty: 90 TABLET | Refills: 0 | Status: SHIPPED | OUTPATIENT
Start: 2023-08-17 | End: 2023-11-27

## 2023-08-17 RX ORDER — AMLODIPINE BESYLATE 2.5 MG/1
2.5 TABLET ORAL DAILY
Qty: 90 TABLET | Refills: 0 | Status: SHIPPED | OUTPATIENT
Start: 2023-08-17 | End: 2023-11-20

## 2023-08-19 ASSESSMENT — ENCOUNTER SYMPTOMS
EYES NEGATIVE: 1
NAUSEA: 0
SORE THROAT: 0
WEAKNESS: 0
ABDOMINAL PAIN: 0
RHINORRHEA: 0
ENDOCRINE NEGATIVE: 1
ALLERGIC/IMMUNOLOGIC NEGATIVE: 1
NECK PAIN: 1
WHEEZING: 0
SINUS PRESSURE: 0
HEADACHES: 0
DIARRHEA: 0
ACTIVITY CHANGE: 0
BACK PAIN: 1
NUMBNESS: 0
FEVER: 0
FATIGUE: 0
COUGH: 0
HEMATOLOGIC/LYMPHATIC NEGATIVE: 1
PSYCHIATRIC NEGATIVE: 1
ABDOMINAL DISTENTION: 0
CONSTIPATION: 0
VOMITING: 0
DIZZINESS: 0
CHEST TIGHTNESS: 0
SHORTNESS OF BREATH: 0
PALPITATIONS: 0
LIGHT-HEADEDNESS: 0
SINUS PAIN: 0
CHILLS: 0

## 2023-08-23 LAB
ALBUMIN (G/DL) IN SER/PLAS: 4.5 G/DL (ref 3.4–5)
ANION GAP IN SER/PLAS: 11 MMOL/L (ref 10–20)
CALCIUM (MG/DL) IN SER/PLAS: 9.7 MG/DL (ref 8.6–10.3)
CARBON DIOXIDE, TOTAL (MMOL/L) IN SER/PLAS: 31 MMOL/L (ref 21–32)
CHLORIDE (MMOL/L) IN SER/PLAS: 97 MMOL/L (ref 98–107)
CREATININE (MG/DL) IN SER/PLAS: 0.7 MG/DL (ref 0.5–1.05)
GFR FEMALE: >90 ML/MIN/1.73M2
GLUCOSE (MG/DL) IN SER/PLAS: 119 MG/DL (ref 74–99)
PHOSPHATE (MG/DL) IN SER/PLAS: 3.7 MG/DL (ref 2.5–4.9)
POTASSIUM (MMOL/L) IN SER/PLAS: 3.8 MMOL/L (ref 3.5–5.3)
SODIUM (MMOL/L) IN SER/PLAS: 135 MMOL/L (ref 136–145)
UREA NITROGEN (MG/DL) IN SER/PLAS: 14 MG/DL (ref 6–23)

## 2023-08-24 LAB
CALCIDIOL (25 OH VITAMIN D3) (NG/ML) IN SER/PLAS: 80 NG/ML
PARATHYRIN INTACT (PG/ML) IN SER/PLAS: 23.5 PG/ML (ref 18.5–88)

## 2023-08-25 LAB
ALBUMIN ELP: 4.3 G/DL (ref 3.4–5)
ALPHA 1: 0.2 G/DL (ref 0.2–0.6)
ALPHA 2: 0.8 G/DL (ref 0.4–1.1)
ANTI-CENTROMERE: <0.2 AI
ANTI-CHROMATIN: <0.2 AI
ANTI-DNA (DS): <1 IU/ML
ANTI-JO-1 IGG: <0.2 AI
ANTI-NUCLEAR ANTIBODY (ANA): NEGATIVE
ANTI-RIBOSOMAL P: <0.2 AI
ANTI-RNP: 0.2 AI
ANTI-SCL-70: <0.2 AI
ANTI-SM/RNP: <0.2 AI
ANTI-SM: <0.2 AI
ANTI-SSA: <0.2 AI
ANTI-SSB: <0.2 AI
BETA: 0.6 G/DL (ref 0.5–1.2)
GAMMA GLOBULIN: 0.6 G/DL (ref 0.5–1.4)
PATH REVIEW - SERUM IMMUNOFIXATION: NORMAL
PATH REVIEW-SERUM PROTEIN ELECTROPHORESIS: NORMAL
PROTEIN ELECTROPHORESIS INTERPRETATION: NORMAL
PROTEIN TOTAL: 6.5 G/DL (ref 6.4–8.2)
SERUM IMMUNOFIXATION INTERPRETATION: NORMAL

## 2023-09-05 DIAGNOSIS — E78.2 COMBINED HYPERLIPIDEMIA: ICD-10-CM

## 2023-09-05 DIAGNOSIS — M54.2 CERVICALGIA: ICD-10-CM

## 2023-09-05 RX ORDER — TIZANIDINE 2 MG/1
2 TABLET ORAL EVERY 8 HOURS PRN
Qty: 90 TABLET | Refills: 0 | Status: SHIPPED | OUTPATIENT
Start: 2023-09-05 | End: 2023-10-03

## 2023-09-06 DIAGNOSIS — E78.2 COMBINED HYPERLIPIDEMIA: ICD-10-CM

## 2023-09-06 RX ORDER — ATORVASTATIN CALCIUM 40 MG/1
40 TABLET, FILM COATED ORAL NIGHTLY
Qty: 90 TABLET | Refills: 0 | Status: SHIPPED | OUTPATIENT
Start: 2023-09-06 | End: 2023-09-06

## 2023-09-06 RX ORDER — ATORVASTATIN CALCIUM 40 MG/1
40 TABLET, FILM COATED ORAL NIGHTLY
Qty: 90 TABLET | Refills: 0 | Status: SHIPPED | OUTPATIENT
Start: 2023-09-06 | End: 2024-03-04

## 2023-09-07 DIAGNOSIS — M54.50 LUMBAR PAIN: ICD-10-CM

## 2023-09-07 DIAGNOSIS — M54.2 CERVICALGIA: ICD-10-CM

## 2023-09-07 RX ORDER — GABAPENTIN 100 MG/1
100 CAPSULE ORAL 3 TIMES DAILY
Qty: 270 CAPSULE | Refills: 0 | Status: SHIPPED | OUTPATIENT
Start: 2023-09-07 | End: 2024-01-30 | Stop reason: SDUPTHER

## 2023-09-18 LAB
ALANINE AMINOTRANSFERASE (SGPT) (U/L) IN SER/PLAS: 20 U/L (ref 7–45)
ALBUMIN (G/DL) IN SER/PLAS: 4.6 G/DL (ref 3.4–5)
ALKALINE PHOSPHATASE (U/L) IN SER/PLAS: 58 U/L (ref 33–136)
ANION GAP IN SER/PLAS: 12 MMOL/L (ref 10–20)
ASPARTATE AMINOTRANSFERASE (SGOT) (U/L) IN SER/PLAS: 25 U/L (ref 9–39)
BILIRUBIN TOTAL (MG/DL) IN SER/PLAS: 0.7 MG/DL (ref 0–1.2)
CALCIUM (MG/DL) IN SER/PLAS: 10 MG/DL (ref 8.6–10.3)
CARBON DIOXIDE, TOTAL (MMOL/L) IN SER/PLAS: 31 MMOL/L (ref 21–32)
CHLORIDE (MMOL/L) IN SER/PLAS: 95 MMOL/L (ref 98–107)
CREATININE (MG/DL) IN SER/PLAS: 0.6 MG/DL (ref 0.5–1.05)
GFR FEMALE: >90 ML/MIN/1.73M2
GLUCOSE (MG/DL) IN SER/PLAS: 96 MG/DL (ref 74–99)
POTASSIUM (MMOL/L) IN SER/PLAS: 4.2 MMOL/L (ref 3.5–5.3)
PROTEIN TOTAL: 6.8 G/DL (ref 6.4–8.2)
SODIUM (MMOL/L) IN SER/PLAS: 134 MMOL/L (ref 136–145)
UREA NITROGEN (MG/DL) IN SER/PLAS: 10 MG/DL (ref 6–23)

## 2023-09-28 DIAGNOSIS — J30.9 ALLERGIC RHINITIS, UNSPECIFIED SEASONALITY, UNSPECIFIED TRIGGER: ICD-10-CM

## 2023-09-28 RX ORDER — MONTELUKAST SODIUM 10 MG/1
10 TABLET ORAL NIGHTLY
Qty: 90 TABLET | Refills: 0 | Status: SHIPPED | OUTPATIENT
Start: 2023-09-28 | End: 2023-11-27

## 2023-10-02 DIAGNOSIS — M54.16 LUMBAR RADICULITIS: Primary | ICD-10-CM

## 2023-10-03 DIAGNOSIS — M54.2 CERVICALGIA: ICD-10-CM

## 2023-10-03 RX ORDER — TIZANIDINE 2 MG/1
2 TABLET ORAL EVERY 8 HOURS PRN
Qty: 90 TABLET | Refills: 0 | Status: SHIPPED | OUTPATIENT
Start: 2023-10-03 | End: 2023-10-27

## 2023-10-09 ENCOUNTER — HOSPITAL ENCOUNTER (OUTPATIENT)
Dept: RADIOLOGY | Facility: HOSPITAL | Age: 68
Discharge: HOME | End: 2023-10-09
Payer: MEDICARE

## 2023-10-09 DIAGNOSIS — M54.9 DORSALGIA, UNSPECIFIED: ICD-10-CM

## 2023-10-09 PROCEDURE — 72146 MRI CHEST SPINE W/O DYE: CPT | Performed by: RADIOLOGY

## 2023-10-09 PROCEDURE — 72146 MRI CHEST SPINE W/O DYE: CPT

## 2023-10-16 ENCOUNTER — HOSPITAL ENCOUNTER (OUTPATIENT)
Dept: RADIOLOGY | Facility: HOSPITAL | Age: 68
Discharge: HOME | End: 2023-10-16
Payer: MEDICARE

## 2023-10-16 VITALS
BODY MASS INDEX: 19.07 KG/M2 | OXYGEN SATURATION: 96 % | RESPIRATION RATE: 18 BRPM | SYSTOLIC BLOOD PRESSURE: 138 MMHG | TEMPERATURE: 98.4 F | DIASTOLIC BLOOD PRESSURE: 78 MMHG | WEIGHT: 101 LBS | HEART RATE: 67 BPM | HEIGHT: 61 IN

## 2023-10-16 DIAGNOSIS — M54.16 LUMBAR RADICULITIS: ICD-10-CM

## 2023-10-16 PROCEDURE — 64483 NJX AA&/STRD TFRM EPI L/S 1: CPT | Performed by: ANESTHESIOLOGY

## 2023-10-16 PROCEDURE — 64483 NJX AA&/STRD TFRM EPI L/S 1: CPT | Mod: 50 | Performed by: ANESTHESIOLOGY

## 2023-10-16 PROCEDURE — 77003 FLUOROGUIDE FOR SPINE INJECT: CPT

## 2023-10-16 RX ORDER — FENTANYL CITRATE 50 UG/ML
25 INJECTION, SOLUTION INTRAMUSCULAR; INTRAVENOUS ONCE
Status: DISCONTINUED | OUTPATIENT
Start: 2023-10-16 | End: 2023-10-20 | Stop reason: HOSPADM

## 2023-10-16 RX ORDER — DEXAMETHASONE SODIUM PHOSPHATE 10 MG/ML
10 INJECTION INTRAMUSCULAR; INTRAVENOUS ONCE
Status: DISCONTINUED | OUTPATIENT
Start: 2023-10-16 | End: 2023-10-20 | Stop reason: HOSPADM

## 2023-10-16 RX ORDER — MIDAZOLAM HYDROCHLORIDE 1 MG/ML
2 INJECTION INTRAMUSCULAR; INTRAVENOUS ONCE
Status: DISCONTINUED | OUTPATIENT
Start: 2023-10-16 | End: 2023-10-20 | Stop reason: HOSPADM

## 2023-10-16 RX ORDER — LIDOCAINE HYDROCHLORIDE 5 MG/ML
10 INJECTION, SOLUTION INFILTRATION; PERINEURAL ONCE
Status: DISCONTINUED | OUTPATIENT
Start: 2023-10-16 | End: 2023-10-20 | Stop reason: HOSPADM

## 2023-10-16 ASSESSMENT — PAIN SCALES - GENERAL
PAINLEVEL_OUTOF10: 5 - MODERATE PAIN
PAINLEVEL_OUTOF10: 0 - NO PAIN
PAINLEVEL_OUTOF10: 5 - MODERATE PAIN

## 2023-10-16 ASSESSMENT — PAIN - FUNCTIONAL ASSESSMENT
PAIN_FUNCTIONAL_ASSESSMENT: 0-10
PAIN_FUNCTIONAL_ASSESSMENT: 0-10

## 2023-10-16 NOTE — PROCEDURES
Preoperative diagnosis:  Lumbar radiculitis  Postoperative diagnosis:  Lumbar radiculitis, stenosis  Procedure: Bilateral L4 Lumbar transforaminal epidural steroid injection under fluoroscopic guidance  Surgeon: Cheyanne Vargas  Assistant:  Fellow, Serjio Barraza  Anesthesia: Local   Complications: Apparently none    Clinical note: Dinora is a 68-year-old female with history of back and leg pain who presents today for the aforementioned procedure.    Procedure note: The patient was met in the preoperative holding area after risks benefits and alternatives to procedure were discussed with the patient, informed consent was obtained. Patient brought back to the procedure room and placed in the prone position on the fluoroscopy table. Area over the back was exposed, prepped, draped, in the usual sterile fashion.  Skin and subcutaneous tissues to the neuroforamen was anesthetized using 0.5% lidocaine.  22-gauge Sprotte needles were inserted in the skin and advanced into the foramen. Needle tip position was confirmed in AP oblique and lateral view.  Contrast was injected which showed appropriate epidural spread, no intravascular or intrathecal uptake. A total of 2 mL of 0.5% lidocaine mixed with 10 mg dexamethasone was injected in divided doses among the 2 needles. Needles removed, bandage applied, patient tolerated the procedure well with no immediate complications.

## 2023-10-16 NOTE — H&P
History Of Present Illness  Dinora Elias is a 68 y.o. female presents for procedure state below. Endorses no changes in past medical history or medical health since last seen in clinic.     Past Medical History  She has a past medical history of Acute gingivitis, plaque induced (12/18/2014), Encounter for screening for malignant neoplasm of colon (11/03/2014), Other specified disorders of teeth and supporting structures (01/04/2016), Otitis media, unspecified, right ear (01/25/2018), Personal history of other diseases of the circulatory system, Personal history of other diseases of the nervous system and sense organs (05/29/2015), Personal history of other diseases of the respiratory system, Personal history of other diseases of the respiratory system (06/10/2016), Personal history of other specified conditions (09/11/2014), and Unspecified otitis externa, unspecified ear (09/26/2018).    Surgical History  She has a past surgical history that includes Breast biopsy (10/01/2013) and Total vaginal hysterectomy (10/01/2013).     Social History  She reports that she quit smoking about 9 months ago. Her smoking use included cigarettes. She has never used smokeless tobacco. She reports that she does not use drugs. No history on file for alcohol use.    Family History  Family History   Problem Relation Name Age of Onset    Lung cancer Mother      Other (cerebromeningeal hemmorrhage) Father          Allergies  Patient has no known allergies.    Review of Symptoms:   Constitutional: Negative for chills, diaphoresis or fever  HENT: Negative for neck swelling  Eyes:.  Negative for eye pain  Respiratory:.  Negative for cough, shortness of breath or wheezing    Cardiovascular:.  Negative for chest pain or palpitations  Gastrointestinal:.  Negative for abdominal pain, nausea and vomiting  Genitourinary:.  Negative for urgency  Musculoskeletal:  Positive for back pain. Positive for joint pain. Denies falls within the past 3  months.  Skin: Negative for wounds or itching   Neurological: Negative for dizziness, seizures, loss of consciousness and weakness  Endo/Heme/Allergies: Does not bruise/bleed easily  Psychiatric/Behavioral: Negative for depression. The patient does not appear anxious.       PHYSICAL EXAM  Vitals signs reviewed  Constitutional:       General: Not in acute distress     Appearance: Normal appearance. Not ill-appearing.  HENT:     Head: Normocephalic and atraumatic  Eyes:     Conjunctiva/sclera: Conjunctivae normal  Cardiovascular:     Rate and Rhythm: Normal rate and regular rhythm  Pulmonary:     Effort: No respiratory distress  Abdominal:     Palpations: Abdomen is soft  Musculoskeletal: PICKETT  Skin:     General: Skin is warm and dry  Neurological:     General: No focal deficit present  Psychiatric:         Mood and Affect: Mood normal         Behavior: Behavior normal     Last Recorded Vitals  There were no vitals taken for this visit.    Relevant Results  Current Outpatient Medications   Medication Instructions    amLODIPine (NORVASC) 2.5 mg, oral, Daily    atorvastatin (LIPITOR) 40 mg, oral, Nightly    azelastine (Optivar) 0.05 % ophthalmic solution ophthalmic (eye)    cholecalciferol (VITAMIN D-3) 125 mcg, oral, Daily    denosumab (Prolia) 60 mg/mL syringe INJECT 60MG (1ML) SUBCUTANEOUSLY EVERY 6 MONTHS.    diazePAM (Valium) 2 mg tablet 1-2 tablets, oral, Every 8 hours PRN    dicyclomine (BENTYL) 10 mg, oral, 2 times daily    EPINEPHrine 0.3 mg/0.3 mL injection syringe injection    fexofenadine (Allegra) 180 mg tablet 1 tablet, oral, Daily    gabapentin (NEURONTIN) 100 mg, oral, 3 times daily    hydroCHLOROthiazide (HYDRODIURIL) 12.5 mg, oral, Daily    ipratropium (Atrovent) 42 mcg (0.06 %) nasal spray nasal    lisinopril 20 mg, oral, Daily    montelukast (SINGULAIR) 10 mg, oral, Nightly    omeprazole (PRILOSEC) 40 mg, oral, Daily    tiZANidine (ZANAFLEX) 2 mg, oral, Every 8 hours PRN    Trelegy Ellipta 100-62.5-25  mcg blister with device 1 puff, inhalation, Daily         MR lumbar spine wo IV contrast 05/24/2023    Narrative  Interpreted By:  KATT OJEDA MD  MRN: [id number]  Patient Name: JEFFREY MCKNIGHT    STUDY:  MRI L-SPINE WO;  5/24/2023 2:48 pm    INDICATION:  back pain, DDD.    COMPARISON:  Radiograph 02/07/2023.    ACCESSION NUMBER(S):  [id number]    ORDERING CLINICIAN:  ERICA URBINA    TECHNIQUE:  Sagittal T1, T2, STIR, axial T1 and T2 weighted images of the lumbar  spine were acquired.    FINDINGS:  For counting purposes the last lumbarized vertebral body is labeled  L5.    There is S shaped scoliosis of the lumbar spine. There is  levoconvexity of the upper lumbar spine and dextro convexity of the  lower lumbar spine.    There is grade 1 anterolisthesis of L4 on L5. Alignment, vertebral  body heights and marrow signal pattern otherwise within normal  limits. Edema within the endplates at L4-L5 is likely degenerative.  There is desiccated disc signal throughout the lumbar spine with  moderate to severe disc height loss at L1-L2 and L4-L5. The conus  terminates at L1-L2 and is unremarkable.    Incompletely evaluated T2 hyperintense lesion within the liver may  represent a cyst. Evaluation of the paraspinal soft tissues is  otherwise unremarkable.    Evaluation by level:    T12-L1: No significant spinal canal or neural foraminal stenosis.    L1-L2: Right eccentric disc bulge and facet arthrosis. No significant  spinal canal stenosis. No significant left and moderate right neural  foraminal stenosis.    L2-L3: Disc bulge, facet arthrosis and ligamentum flavum thickening.  Mild spinal canal stenosis. Moderate right and mild-to-moderate left  neural foraminal stenosis.    L3-L4: Disc bulge, facet arthrosis and ligamentum flavum thickening.  Mild spinal canal stenosis. Moderate left and mild-to-moderate right  neural foraminal stenosis.    L4-L5: Disc bulge, facet arthrosis and ligamentum flavum thickening.  Severe  spinal canal stenosis. Severe left and no significant right  neural foraminal stenosis.    L5-S1: No significant spinal canal or neural foraminal stenosis.    Impression  S shaped scoliosis of the lumbar spine with multilevel degenerative  disc disease and facet arthrosis. There is severe spinal canal and  severe left neural foraminal stenosis at L4-L5.      MR cervical spine wo IV contrast 05/24/2023    Narrative  Interpreted By:  KATT OJEDA MD  MRN: [id number]  Patient Name: JEFFREY MCKNIGHT    STUDY:  MRI CERVICAL WO;  5/24/2023 2:43 pm    INDICATION:  severe c-spine degenerative disease on x-ray, neck pain.    COMPARISON:  Radiograph 02/07/2023.    ACCESSION NUMBER(S):  [id number]    ORDERING CLINICIAN:  ERICA URBINA    TECHNIQUE:  Sagittal T1, T2, STIR, axial T1 and axial T2 weighted images were  acquired through the cervical spine.    FINDINGS:  Craniocervical junction is within normal limits. Cerebellar tonsils  are above the foramen magnum. There is straightening of the normal  cervical lordosis. There is trace anterolisthesis of C3 on C4 and C4  on C5. Alignment, vertebral body heights and marrow signal pattern  are otherwise within normal limits. There is desiccated disc signal  throughout the cervical spine with moderate disc height loss  degenerative endplate changes at C5-C6 and C6-C7. Cervical cord is  unremarkable. Prevertebral soft tissues are not thickened.    Evaluation by level:    C1-C2: No significant spinal canal stenosis    C2-C3: Mild disc osteophyte complex, uncovertebral joint hypertrophy  and facet arthrosis. No significant spinal canal stenosis. Mild left  and no significant right neural foraminal stenosis    C3-C4: Mild left eccentric disc osteophyte complex, uncovertebral  joint hypertrophy and facet arthrosis. Mild spinal canal stenosis.  Moderate left and mild right neural foraminal stenosis    C4-C5: Disc osteophyte complex, uncovertebral joint hypertrophy and  facet arthrosis.  Moderate spinal canal stenosis. Moderate to severe  right and no significant left neural foraminal stenosis    C5-C6: Disc osteophyte complex, uncovertebral joint hypertrophy and  facet arthrosis. Moderate spinal canal and neural foraminal stenosis.    C6-C7: Disc osteophyte complex, uncovertebral joint hypertrophy and  facet arthrosis. Mild spinal canal stenosis. Moderate left and  mild-to-moderate right neural foraminal stenosis    C7-T1: No significant spinal canal or neural foraminal stenosis.    Impression  Multilevel degenerative disc disease and facet arthrosis with  moderate spinal canal stenosis at C4-C5, and C5-C6. There is moderate  to severe right neural foraminal stenosis at C4-C5 and moderate  neural foraminal stenosis at C3-C4 on the left, C5-C6 and C6-C7  bilaterally.     No image results found.       1. Lumbar radiculitis  Transforaminal    Transforaminal           ASSESSMENT/PLAN  Dinora Elias is a 68 y.o. female presents for bilateral L4 transforaminal epidural steroid Injection    Our plan is as follows:  - Follow In pain clinic  - Continue to participate in physical therapy as well as physician directed home exercises  - Continue pain medications as prescribed       Serjio Barraza MD

## 2023-10-30 DIAGNOSIS — I10 ESSENTIAL HYPERTENSION: ICD-10-CM

## 2023-10-30 DIAGNOSIS — K21.9 GASTROESOPHAGEAL REFLUX DISEASE, UNSPECIFIED WHETHER ESOPHAGITIS PRESENT: ICD-10-CM

## 2023-10-30 RX ORDER — HYDROCHLOROTHIAZIDE 12.5 MG/1
12.5 TABLET ORAL DAILY
Qty: 90 TABLET | Refills: 0 | Status: SHIPPED | OUTPATIENT
Start: 2023-10-30 | End: 2024-01-23

## 2023-10-30 RX ORDER — OMEPRAZOLE 40 MG/1
40 CAPSULE, DELAYED RELEASE ORAL DAILY
Qty: 90 CAPSULE | Refills: 0 | Status: SHIPPED | OUTPATIENT
Start: 2023-10-30 | End: 2024-01-23

## 2023-11-09 ENCOUNTER — OFFICE VISIT (OUTPATIENT)
Dept: PRIMARY CARE | Facility: CLINIC | Age: 68
End: 2023-11-09
Payer: MEDICARE

## 2023-11-09 VITALS
BODY MASS INDEX: 19.88 KG/M2 | TEMPERATURE: 96.9 F | SYSTOLIC BLOOD PRESSURE: 130 MMHG | DIASTOLIC BLOOD PRESSURE: 84 MMHG | WEIGHT: 105.2 LBS | HEART RATE: 83 BPM | OXYGEN SATURATION: 96 %

## 2023-11-09 DIAGNOSIS — E78.2 COMBINED HYPERLIPIDEMIA: ICD-10-CM

## 2023-11-09 DIAGNOSIS — M81.0 OSTEOPOROSIS, UNSPECIFIED OSTEOPOROSIS TYPE, UNSPECIFIED PATHOLOGICAL FRACTURE PRESENCE: ICD-10-CM

## 2023-11-09 DIAGNOSIS — Z12.31 ENCOUNTER FOR SCREENING MAMMOGRAM FOR MALIGNANT NEOPLASM OF BREAST: ICD-10-CM

## 2023-11-09 DIAGNOSIS — J43.9 PULMONARY EMPHYSEMA, UNSPECIFIED EMPHYSEMA TYPE (MULTI): ICD-10-CM

## 2023-11-09 DIAGNOSIS — I25.10 CORONARY ARTERY DISEASE INVOLVING NATIVE CORONARY ARTERY OF NATIVE HEART WITHOUT ANGINA PECTORIS: ICD-10-CM

## 2023-11-09 DIAGNOSIS — E46 PROTEIN-CALORIE MALNUTRITION, UNSPECIFIED SEVERITY (MULTI): ICD-10-CM

## 2023-11-09 DIAGNOSIS — Z87.891 PERSONAL HISTORY OF SMOKING: ICD-10-CM

## 2023-11-09 DIAGNOSIS — I10 ESSENTIAL HYPERTENSION: Primary | ICD-10-CM

## 2023-11-09 PROCEDURE — 1159F MED LIST DOCD IN RCRD: CPT | Performed by: NURSE PRACTITIONER

## 2023-11-09 PROCEDURE — 3075F SYST BP GE 130 - 139MM HG: CPT | Performed by: NURSE PRACTITIONER

## 2023-11-09 PROCEDURE — 3008F BODY MASS INDEX DOCD: CPT | Performed by: NURSE PRACTITIONER

## 2023-11-09 PROCEDURE — 1160F RVW MEDS BY RX/DR IN RCRD: CPT | Performed by: NURSE PRACTITIONER

## 2023-11-09 PROCEDURE — 99214 OFFICE O/P EST MOD 30 MIN: CPT | Performed by: NURSE PRACTITIONER

## 2023-11-09 PROCEDURE — 3079F DIAST BP 80-89 MM HG: CPT | Performed by: NURSE PRACTITIONER

## 2023-11-09 PROCEDURE — 1036F TOBACCO NON-USER: CPT | Performed by: NURSE PRACTITIONER

## 2023-11-09 PROCEDURE — 1126F AMNT PAIN NOTED NONE PRSNT: CPT | Performed by: NURSE PRACTITIONER

## 2023-11-09 ASSESSMENT — ENCOUNTER SYMPTOMS
NAUSEA: 0
DIZZINESS: 0
WEAKNESS: 0
DIARRHEA: 0
PSYCHIATRIC NEGATIVE: 1
LIGHT-HEADEDNESS: 0
SORE THROAT: 0
ENDOCRINE NEGATIVE: 1
CONSTIPATION: 0
ABDOMINAL PAIN: 0
BACK PAIN: 1
HEMATOLOGIC/LYMPHATIC NEGATIVE: 1
EYES NEGATIVE: 1
CHEST TIGHTNESS: 0
ACTIVITY CHANGE: 0
CHILLS: 0
NUMBNESS: 0
VOMITING: 0
ALLERGIC/IMMUNOLOGIC NEGATIVE: 1
WHEEZING: 0
ABDOMINAL DISTENTION: 0
RHINORRHEA: 0
FATIGUE: 0
COUGH: 0
SINUS PRESSURE: 0
SINUS PAIN: 0
FEVER: 0

## 2023-11-09 NOTE — PROGRESS NOTES
Subjective   Patient ID: Dinora Elias is a 68 y.o. female who presents for Hypertension, Hyperlipidemia, and Flu Vaccine (decline).    Severe degenerative disc disease C-spine and lumbar spine.  Saw neurosurgery, she is not a candidate for surgery given her osteoporosis and smoking.  Neurosurgery referred her to pain management, seeing Dr. Cheyanne Vargas for pain, recent injection.  Chronic sinusitis, following with ENT.   Xerostomia, improved. Using Biotene, tongue scraper, following with dentist and ENT. Recommend increase fluid intake, stay well-hydrated. Using dicyclomine rarely. Quit smoking.   Allergic rhinitis, following with allergist. Now taking Allegra and Singulair, using ipratropium nasal solution. Receiving allergy shots. She thinks they are helping.  Lung nodules stable, repeat LDCT 2024  Osteopenia and osteoporosis, following with rheumatology, alendronate stopped and Prolia started by rheumatology.  CAD, continue atorvastatin 40 mg daily, aspirin, good blood pressure control. CT chest shows severe coronary calcifications. Normal stress test 2023. CT cardiac score 1134. Following with cardiology.   The 10-year ASCVD risk score (Benji DK, et al., 2019) is: 11.3%    Values used to calculate the score:      Age: 68 years      Sex: Female      Is Non- : No      Diabetic: No      Tobacco smoker: No      Systolic Blood Pressure: 130 mmHg      Is BP treated: Yes      HDL Cholesterol: 96 mg/dL      Total Cholesterol: 180 mg/dL  Hypertension, controlled. Taking lisinopril 10 mg daily and hydrochlorothiazide 12.5 mg daily.    Preventive:  CRC screen:   Mammogram: 2022 negative  DEXA scan: 2023 osteoporosis  CT cardiac scoring: 3/2023 score 1134  Stress test:  normal  LDCT lung cancer screenin2023    === 23 ===    MR LUMBAR SPINE WO CONTRAST    - Impression -  S shaped scoliosis of the lumbar spine with multilevel degenerative  disc disease and facet  arthrosis. There is severe spinal canal and  severe left neural foraminal stenosis at L4-L5.         Review of Systems   Constitutional:  Negative for activity change, chills, fatigue and fever.   HENT:  Negative for congestion, rhinorrhea, sinus pressure, sinus pain and sore throat.         Dry mouth   Eyes: Negative.    Respiratory:  Negative for cough, chest tightness and wheezing.    Cardiovascular:  Negative for leg swelling.   Gastrointestinal:  Negative for abdominal distention, abdominal pain, constipation, diarrhea, nausea and vomiting.   Endocrine: Negative.    Genitourinary: Negative.    Musculoskeletal:  Positive for back pain.   Skin: Negative.    Allergic/Immunologic: Negative.    Neurological:  Negative for dizziness, syncope, weakness, light-headedness and numbness.   Hematological: Negative.    Psychiatric/Behavioral: Negative.     All other systems reviewed and are negative.      Objective   /84   Pulse 83   Temp 36.1 °C (96.9 °F)   Wt 47.7 kg (105 lb 3.2 oz)   SpO2 96%   BMI 19.88 kg/m²     Physical Exam  Vitals and nursing note reviewed.   Constitutional:       General: She is not in acute distress.     Appearance: Normal appearance. She is not ill-appearing.   HENT:      Head: Normocephalic and atraumatic.      Right Ear: Tympanic membrane, ear canal and external ear normal.      Left Ear: Tympanic membrane, ear canal and external ear normal.      Nose: Nose normal.      Mouth/Throat:      Mouth: Mucous membranes are dry.      Pharynx: Oropharynx is clear.   Eyes:      Pupils: Pupils are equal, round, and reactive to light.   Cardiovascular:      Rate and Rhythm: Normal rate and regular rhythm.      Pulses: Normal pulses.      Heart sounds: Normal heart sounds. No murmur heard.  Pulmonary:      Effort: Pulmonary effort is normal. No respiratory distress.      Breath sounds: Normal breath sounds. No wheezing.   Abdominal:      General: Bowel sounds are normal. There is no distension.  "     Palpations: Abdomen is soft.      Tenderness: There is no abdominal tenderness.   Musculoskeletal:         General: No tenderness. Normal range of motion.      Cervical back: Normal range of motion and neck supple.      Right lower leg: No edema.      Left lower leg: No edema.   Skin:     General: Skin is warm and dry.      Capillary Refill: Capillary refill takes less than 2 seconds.      Coloration: Skin is not jaundiced.   Neurological:      General: No focal deficit present.      Mental Status: She is alert and oriented to person, place, and time.      Motor: No weakness.   Psychiatric:         Mood and Affect: Mood normal.         Behavior: Behavior normal.         Thought Content: Thought content normal.         Judgment: Judgment normal.       Assessment/Plan     #Lumbar and c-spine DDD  -Not a surgical candidate  -Following with pain management  #Hypertension, controlled  -Continue amlodipine 2.5 mg daily, lisinopril 20 mg daily, HCTZ 12.5 mg daily  -Low fat/cholesterol, low sodium, low carbohydrate diet  -Exercise as tolerated 150 minutes/week, increase activity slowly  -Maintain healthy weight  #CAD, hyperlipidemia  -BP control  -Continue atorvastatin 40 mg daily  -Low fat/low cholesterol diet  -Eat more fresh foods  -Avoid processed/prepackaged/fast foods  -Gradually increase physical activity  -Weight loss  #Osteoporosis  -Prolia per rheumatology  -Follow-up with rheumatology  #Xerostomia, improving  -Stay well-hydrated  -Continue over-the-counter Biotene or other dry mouth product  #GERD  -Continue omeprazole 40 mg daily  -Avoid alcohol, caffeine, and other foods that tend to bother your stomach  -Elevate head of bed 4-6\" on blocks  -Avoid eating 2 hours prior to bed  -Do not wear constricting clothing around your middle  #IBS  -Bentyl 20 mg every 6 hours only as needed  #Former smoker  -Low dose CT for lung cancer screening due 1/2024  #Allergic rhinitis  -Follow with allergy/immunology  -Continue " Singulair, Allegra, ipratropium  #Chronic sinusitis  -Follow with ENT    Follow-up 6 months for Medicare physical and as needed

## 2023-11-10 DIAGNOSIS — E55.9 VITAMIN D DEFICIENCY: ICD-10-CM

## 2023-11-10 RX ORDER — CHOLECALCIFEROL (VITAMIN D3) 125 MCG
125 CAPSULE ORAL DAILY
Qty: 90 CAPSULE | Refills: 1 | Status: SHIPPED | OUTPATIENT
Start: 2023-11-10 | End: 2024-05-06 | Stop reason: SDUPTHER

## 2023-11-16 ENCOUNTER — OFFICE VISIT (OUTPATIENT)
Dept: PAIN MEDICINE | Facility: HOSPITAL | Age: 68
End: 2023-11-16
Payer: MEDICARE

## 2023-11-16 DIAGNOSIS — M54.50 LUMBAR PAIN: ICD-10-CM

## 2023-11-16 DIAGNOSIS — M54.2 CERVICALGIA: ICD-10-CM

## 2023-11-16 PROCEDURE — 1160F RVW MEDS BY RX/DR IN RCRD: CPT | Performed by: ANESTHESIOLOGY

## 2023-11-16 PROCEDURE — 1036F TOBACCO NON-USER: CPT | Performed by: ANESTHESIOLOGY

## 2023-11-16 PROCEDURE — 1125F AMNT PAIN NOTED PAIN PRSNT: CPT | Performed by: ANESTHESIOLOGY

## 2023-11-16 PROCEDURE — 3008F BODY MASS INDEX DOCD: CPT | Performed by: ANESTHESIOLOGY

## 2023-11-16 PROCEDURE — 1159F MED LIST DOCD IN RCRD: CPT | Performed by: ANESTHESIOLOGY

## 2023-11-16 PROCEDURE — 99214 OFFICE O/P EST MOD 30 MIN: CPT | Performed by: ANESTHESIOLOGY

## 2023-11-16 ASSESSMENT — PAIN SCALES - GENERAL: PAINLEVEL: 6

## 2023-11-16 NOTE — PROGRESS NOTES
Chief Complaint   Patient presents with    Back Pain     Subjective   Patient ID: Dinora Elias is a 68 y.o. female with a past medical history of osteoporosis, lumbar spinal stenosis and cervicalgia presents 1 month after a bilateral L4 transforaminal epidural steroid injection. Feels some relief, approximately 40% better. States she still gets the pain while sitting, standing and walking for a long period of time. Will occasionally radiate as far as her left hip. Currently 1-2/10, can get up to a 10/10. Is relieved by a TENS unit, and a tylenol.     Is continuing her home exercises and has tolerated the increase in her gabapentin to 300mg TID.     Physical Therapy: The patient has done six weeks of physical therapy in the past six months with minimal improvement  Other Conservative Measures she has tried: TENS  Classes of medications tried in the past: Acetaminophen, NSAIDs, and Gabapentenoids      Review of Systems   13-point ROS done and negative except for HPI.     Current Outpatient Medications   Medication Instructions    amLODIPine (NORVASC) 2.5 mg, oral, Daily    atorvastatin (LIPITOR) 40 mg, oral, Nightly    cholecalciferol (VITAMIN D-3) 5,000 Units, oral, Daily    denosumab (Prolia) 60 mg/mL syringe INJECT 60MG (1ML) SUBCUTANEOUSLY EVERY 6 MONTHS.    EPINEPHrine 0.3 mg/0.3 mL injection syringe injection    fexofenadine (Allegra) 180 mg tablet 1 tablet, oral, Daily    gabapentin (NEURONTIN) 100 mg, oral, 3 times daily    hydroCHLOROthiazide (HYDRODIURIL) 12.5 mg, oral, Daily    ipratropium (Atrovent) 42 mcg (0.06 %) nasal spray nasal    lisinopril 20 mg, oral, Daily    montelukast (SINGULAIR) 10 mg, oral, Nightly    omeprazole (PRILOSEC) 40 mg, oral, Daily    tiZANidine (ZANAFLEX) 2 mg, oral, Every 8 hours PRN    Trelegy Ellipta 100-62.5-25 mcg blister with device 1 puff, inhalation, Daily       Past Medical History:   Diagnosis Date    Acute gingivitis, plaque induced 12/18/2014    Acute gingivitis     Encounter for screening for malignant neoplasm of colon 11/03/2014    Encounter for screening for malignant neoplasm of colon    HTN (hypertension)     Other specified disorders of teeth and supporting structures 01/04/2016    Tooth pain    Otitis media, unspecified, right ear 01/25/2018    Acute right otitis media    Personal history of other diseases of the circulatory system     History of hypertension    Personal history of other diseases of the nervous system and sense organs 05/29/2015    History of acute otitis media    Personal history of other diseases of the respiratory system     History of chronic obstructive lung disease    Personal history of other diseases of the respiratory system 06/10/2016    History of sinusitis    Personal history of other specified conditions 09/11/2014    History of abdominal pain    Unspecified otitis externa, unspecified ear 09/26/2018    Otitis externa        Past Surgical History:   Procedure Laterality Date    BREAST BIOPSY  10/01/2013    Biopsy Breast Open    TOTAL VAGINAL HYSTERECTOMY  10/01/2013    Vaginal Hysterectomy        Family History   Problem Relation Name Age of Onset    Lung cancer Mother      Other (cerebromeningeal hemmorrhage) Father          No Known Allergies     Objective     There were no vitals filed for this visit.     Physical Exam      General: NAD, well groomed, well nourished  Eyes: Non-icteric sclera, EOMI  Ears, Nose, Mouth, and Throat: External ears and nose appear to be without deformity or rash. No lesions or masses noted. Hearing is grossly intact.   Neck: Trachea midline  Respiratory: Nonlabored breathing   Cardiovascular: no peripheral edema   Skin: No rashes or open lesions/ulcers identified on skin.    Back:   Palpation: No tenderness to palpation over lumbar paraspinous muscles.   Straight leg raise: does not reproduce their pain, bilaterally   SI Joint: No tenderness to palpation over SI joint, bilaterally. No pain with compression,  Gaenslen test, GOKUL test, bilaterally.    Hip: No pain over greater trochanters. and Pain not reproduced with hip internal/external rotation.     Neurologic:   Cranial nerves grossly intact.   Strength 5/5 and symmetric plantar/dorsiflexion   Sensation: Normal to light touch throughout, pinprick intact throughout.  DTRs:normal and symmetric throughout  Hurtado: absent  Clonus: absent    Psychiatric: Alert, orientation to person, place, and time. Cooperative.    Imaging personally reviewed and independently interpreted    Assessment/Plan   68 y.o. female with a past medical history of osteoporosis, lumbar spinal stenosis and cervicalgia presents 1 month after a b/l L4 transforaminal epidural steroid injection with slight improvement in her symptoms but not where she wants to be. MRI findings for severe spinal stenosis at L4-5.     Plan:  -Bilateral L4 transforaminal KARRIE with methylprednisolone.  We discussed using a different medication to see if we could get her better more long-lasting relief.  -Discussed with her that given her level of degeneration of the spine, she is unlikely to get 100% pain relief. If this change in medication for her epidural does not provide the relief she is looking for, she could be a candidate for the MILD procedure. She has significant ligamentum flavum hypertrophy and symptoms consistent stenosis 2/2 ligamentum hypertrophy.  At any time she can also be a surgical candidate.  Patient does not want to consider surgical measures.    -We will refer the patient physical therapy for her cervical spine: A referral for physical therapy was provided to the patient. We discussed initiating physical therapy to help manage the patient's painful condition. The patient was counseled that muscle strengthening will improve the long term prognosis in regards to pain and may also help increase range of motion and mobility. The patient's questions were answered and he was agreeable to this course.      The patient was invited to contact us back anytime with any questions or concerns and follow-up with us in the office as needed.

## 2023-11-19 DIAGNOSIS — I10 ESSENTIAL HYPERTENSION: ICD-10-CM

## 2023-11-20 RX ORDER — AMLODIPINE BESYLATE 2.5 MG/1
2.5 TABLET ORAL DAILY
Qty: 90 TABLET | Refills: 1 | Status: SHIPPED | OUTPATIENT
Start: 2023-11-20

## 2023-11-22 DIAGNOSIS — J30.9 ALLERGIC RHINITIS, UNSPECIFIED SEASONALITY, UNSPECIFIED TRIGGER: ICD-10-CM

## 2023-11-22 DIAGNOSIS — I10 ESSENTIAL HYPERTENSION: ICD-10-CM

## 2023-11-27 DIAGNOSIS — M54.2 CERVICALGIA: ICD-10-CM

## 2023-11-27 RX ORDER — TIZANIDINE 2 MG/1
2 TABLET ORAL EVERY 8 HOURS PRN
Qty: 90 TABLET | Refills: 0 | Status: SHIPPED | OUTPATIENT
Start: 2023-11-27 | End: 2023-12-22

## 2023-11-27 RX ORDER — MONTELUKAST SODIUM 10 MG/1
10 TABLET ORAL NIGHTLY
Qty: 90 TABLET | Refills: 0 | Status: SHIPPED | OUTPATIENT
Start: 2023-11-27 | End: 2024-02-22

## 2023-11-27 RX ORDER — LISINOPRIL 20 MG/1
20 TABLET ORAL DAILY
Qty: 90 TABLET | Refills: 0 | Status: SHIPPED | OUTPATIENT
Start: 2023-11-27 | End: 2024-02-22

## 2023-12-08 ENCOUNTER — APPOINTMENT (OUTPATIENT)
Dept: RADIOLOGY | Facility: HOSPITAL | Age: 68
End: 2023-12-08
Payer: MEDICARE

## 2023-12-12 ENCOUNTER — LAB (OUTPATIENT)
Dept: LAB | Facility: LAB | Age: 68
End: 2023-12-12
Payer: MEDICARE

## 2023-12-12 ENCOUNTER — HOSPITAL ENCOUNTER (OUTPATIENT)
Dept: RADIOLOGY | Facility: HOSPITAL | Age: 68
Discharge: HOME | End: 2023-12-12
Payer: MEDICARE

## 2023-12-12 DIAGNOSIS — Z12.31 ENCOUNTER FOR SCREENING MAMMOGRAM FOR MALIGNANT NEOPLASM OF BREAST: ICD-10-CM

## 2023-12-12 DIAGNOSIS — E78.2 COMBINED HYPERLIPIDEMIA: ICD-10-CM

## 2023-12-12 DIAGNOSIS — I10 ESSENTIAL HYPERTENSION: ICD-10-CM

## 2023-12-12 LAB
ALBUMIN SERPL BCP-MCNC: 4.8 G/DL (ref 3.4–5)
ALP SERPL-CCNC: 57 U/L (ref 33–136)
ALT SERPL W P-5'-P-CCNC: 24 U/L (ref 7–45)
ANION GAP SERPL CALC-SCNC: 13 MMOL/L (ref 10–20)
AST SERPL W P-5'-P-CCNC: 26 U/L (ref 9–39)
BASOPHILS # BLD AUTO: 0.05 X10*3/UL (ref 0–0.1)
BASOPHILS NFR BLD AUTO: 0.5 %
BILIRUB SERPL-MCNC: 0.7 MG/DL (ref 0–1.2)
BUN SERPL-MCNC: 15 MG/DL (ref 6–23)
CALCIUM SERPL-MCNC: 10.3 MG/DL (ref 8.6–10.3)
CHLORIDE SERPL-SCNC: 95 MMOL/L (ref 98–107)
CHOLEST SERPL-MCNC: 215 MG/DL (ref 0–199)
CHOLESTEROL/HDL RATIO: 2.1
CO2 SERPL-SCNC: 31 MMOL/L (ref 21–32)
CREAT SERPL-MCNC: 0.69 MG/DL (ref 0.5–1.05)
EOSINOPHIL # BLD AUTO: 0.09 X10*3/UL (ref 0–0.7)
EOSINOPHIL NFR BLD AUTO: 1 %
ERYTHROCYTE [DISTWIDTH] IN BLOOD BY AUTOMATED COUNT: 13.2 % (ref 11.5–14.5)
GFR SERPL CREATININE-BSD FRML MDRD: >90 ML/MIN/1.73M*2
GLUCOSE SERPL-MCNC: 95 MG/DL (ref 74–99)
HCT VFR BLD AUTO: 43.1 % (ref 36–46)
HDLC SERPL-MCNC: 103.7 MG/DL
HGB BLD-MCNC: 14.3 G/DL (ref 12–16)
IMM GRANULOCYTES # BLD AUTO: 0.04 X10*3/UL (ref 0–0.7)
IMM GRANULOCYTES NFR BLD AUTO: 0.4 % (ref 0–0.9)
LDLC SERPL CALC-MCNC: 92 MG/DL
LYMPHOCYTES # BLD AUTO: 1.37 X10*3/UL (ref 1.2–4.8)
LYMPHOCYTES NFR BLD AUTO: 14.7 %
MCH RBC QN AUTO: 33.4 PG (ref 26–34)
MCHC RBC AUTO-ENTMCNC: 33.2 G/DL (ref 32–36)
MCV RBC AUTO: 101 FL (ref 80–100)
MONOCYTES # BLD AUTO: 0.85 X10*3/UL (ref 0.1–1)
MONOCYTES NFR BLD AUTO: 9.1 %
NEUTROPHILS # BLD AUTO: 6.9 X10*3/UL (ref 1.2–7.7)
NEUTROPHILS NFR BLD AUTO: 74.3 %
NON HDL CHOLESTEROL: 111 MG/DL (ref 0–149)
NRBC BLD-RTO: 0 /100 WBCS (ref 0–0)
PLATELET # BLD AUTO: 373 X10*3/UL (ref 150–450)
POTASSIUM SERPL-SCNC: 4.3 MMOL/L (ref 3.5–5.3)
PROT SERPL-MCNC: 7.2 G/DL (ref 6.4–8.2)
RBC # BLD AUTO: 4.28 X10*6/UL (ref 4–5.2)
SODIUM SERPL-SCNC: 135 MMOL/L (ref 136–145)
TRIGL SERPL-MCNC: 96 MG/DL (ref 0–149)
TSH SERPL-ACNC: 0.95 MIU/L (ref 0.44–3.98)
VLDL: 19 MG/DL (ref 0–40)
WBC # BLD AUTO: 9.3 X10*3/UL (ref 4.4–11.3)

## 2023-12-12 PROCEDURE — 36415 COLL VENOUS BLD VENIPUNCTURE: CPT

## 2023-12-12 PROCEDURE — 77063 BREAST TOMOSYNTHESIS BI: CPT

## 2023-12-12 PROCEDURE — 84443 ASSAY THYROID STIM HORMONE: CPT

## 2023-12-12 PROCEDURE — 77067 SCR MAMMO BI INCL CAD: CPT | Performed by: RADIOLOGY

## 2023-12-12 PROCEDURE — 77063 BREAST TOMOSYNTHESIS BI: CPT | Performed by: RADIOLOGY

## 2023-12-12 PROCEDURE — 80061 LIPID PANEL: CPT

## 2023-12-12 PROCEDURE — 85025 COMPLETE CBC W/AUTO DIFF WBC: CPT

## 2023-12-12 PROCEDURE — 80053 COMPREHEN METABOLIC PANEL: CPT

## 2023-12-18 ENCOUNTER — HOSPITAL ENCOUNTER (OUTPATIENT)
Dept: RADIOLOGY | Facility: HOSPITAL | Age: 68
Discharge: HOME | End: 2023-12-18
Payer: MEDICARE

## 2023-12-18 VITALS
HEIGHT: 61 IN | WEIGHT: 103 LBS | SYSTOLIC BLOOD PRESSURE: 135 MMHG | BODY MASS INDEX: 19.45 KG/M2 | OXYGEN SATURATION: 98 % | RESPIRATION RATE: 18 BRPM | DIASTOLIC BLOOD PRESSURE: 72 MMHG | HEART RATE: 82 BPM | TEMPERATURE: 97.9 F

## 2023-12-18 DIAGNOSIS — M54.50 LUMBAR PAIN: ICD-10-CM

## 2023-12-18 PROCEDURE — 77003 FLUOROGUIDE FOR SPINE INJECT: CPT

## 2023-12-18 PROCEDURE — 64483 NJX AA&/STRD TFRM EPI L/S 1: CPT | Mod: 50 | Performed by: ANESTHESIOLOGY

## 2023-12-18 PROCEDURE — 64483 NJX AA&/STRD TFRM EPI L/S 1: CPT | Performed by: ANESTHESIOLOGY

## 2023-12-18 PROCEDURE — 2500000004 HC RX 250 GENERAL PHARMACY W/ HCPCS (ALT 636 FOR OP/ED): Performed by: ANESTHESIOLOGY

## 2023-12-18 RX ORDER — MIDAZOLAM HYDROCHLORIDE 1 MG/ML
INJECTION INTRAMUSCULAR; INTRAVENOUS
Status: COMPLETED | OUTPATIENT
Start: 2023-12-18 | End: 2023-12-18

## 2023-12-18 RX ADMIN — MIDAZOLAM HYDROCHLORIDE 2 MG: 1 INJECTION INTRAMUSCULAR; INTRAVENOUS at 14:56

## 2023-12-18 ASSESSMENT — PAIN SCALES - GENERAL
PAINLEVEL_OUTOF10: 4
PAINLEVEL_OUTOF10: 0 - NO PAIN
PAINLEVEL_OUTOF10: 0 - NO PAIN
PAINLEVEL_OUTOF10: 4
PAINLEVEL_OUTOF10: 5 - MODERATE PAIN

## 2023-12-18 ASSESSMENT — PAIN - FUNCTIONAL ASSESSMENT
PAIN_FUNCTIONAL_ASSESSMENT: 0-10

## 2023-12-18 NOTE — H&P
HISTORY AND PHYSICAL    History Of Present Illness  Dinora Elias is a 68 y.o. female presenting with chronic pain.  Here for bilateral TFESI with depo.     she denies any recent antibiotic use or infections, she denies any blood thinner use , and she denies contrast or local anesthetic allergies     Past Medical History  Past Medical History:   Diagnosis Date    Acute gingivitis, plaque induced 12/18/2014    Acute gingivitis    Encounter for screening for malignant neoplasm of colon 11/03/2014    Encounter for screening for malignant neoplasm of colon    HTN (hypertension)     Other specified disorders of teeth and supporting structures 01/04/2016    Tooth pain    Otitis media, unspecified, right ear 01/25/2018    Acute right otitis media    Personal history of other diseases of the circulatory system     History of hypertension    Personal history of other diseases of the nervous system and sense organs 05/29/2015    History of acute otitis media    Personal history of other diseases of the respiratory system     History of chronic obstructive lung disease    Personal history of other diseases of the respiratory system 06/10/2016    History of sinusitis    Personal history of other specified conditions 09/11/2014    History of abdominal pain    Unspecified otitis externa, unspecified ear 09/26/2018    Otitis externa       Surgical History  Past Surgical History:   Procedure Laterality Date    BREAST BIOPSY  10/01/2013    Biopsy Breast Open    TOTAL VAGINAL HYSTERECTOMY  10/01/2013    Vaginal Hysterectomy        Social History  She reports that she quit smoking about a year ago. Her smoking use included cigarettes. She has never used smokeless tobacco. She reports that she does not use drugs. No history on file for alcohol use.    Family History  Family History   Problem Relation Name Age of Onset    Lung cancer Mother      Other (cerebromeningeal hemmorrhage) Father      Breast cancer Sister  64       "  Allergies  Patient has no known allergies.    Review of Systems   12 point ROS done and negative except for the above.   Physical Exam     General: NAD, well groomed, well nourished  Eyes: Non-icteric sclera, EOMI  Ears, Nose, Mouth, and Throat: External ears and nose appear to be without deformity or rash. No lesions or masses noted. Hearing is grossly intact.   Neck: Trachea midline  Respiratory: Nonlabored breathing   Cardiovascular: No peripheral edema   Skin: No rashes or open lesions/ulcers identified on skin.    Last Recorded Vitals  Blood pressure (!) 142/91, pulse 77, temperature 37 °C (98.6 °F), temperature source Temporal, resp. rate 16, height 1.549 m (5' 1\"), weight 46.7 kg (103 lb), SpO2 99 %.    Relevant Results           Assessment/Plan       Risks, benefits, alternatives discussed. All questions answered to the best of my ability. Patient agrees to proceed.   -We will proceed with planned procedure        Kushal Barraza MD  Chronic Pain Fellow  [facility]    "

## 2023-12-22 DIAGNOSIS — M54.2 CERVICALGIA: ICD-10-CM

## 2023-12-22 RX ORDER — TIZANIDINE 2 MG/1
2 TABLET ORAL EVERY 8 HOURS PRN
Qty: 90 TABLET | Refills: 0 | Status: SHIPPED | OUTPATIENT
Start: 2023-12-22 | End: 2024-01-18

## 2024-01-18 DIAGNOSIS — M54.2 CERVICALGIA: ICD-10-CM

## 2024-01-18 RX ORDER — TIZANIDINE 2 MG/1
2 TABLET ORAL EVERY 8 HOURS PRN
Qty: 90 TABLET | Refills: 3 | Status: SHIPPED | OUTPATIENT
Start: 2024-01-18 | End: 2024-04-25

## 2024-01-23 DIAGNOSIS — K21.9 GASTROESOPHAGEAL REFLUX DISEASE, UNSPECIFIED WHETHER ESOPHAGITIS PRESENT: ICD-10-CM

## 2024-01-23 DIAGNOSIS — I10 ESSENTIAL HYPERTENSION: ICD-10-CM

## 2024-01-23 RX ORDER — OMEPRAZOLE 40 MG/1
40 CAPSULE, DELAYED RELEASE ORAL DAILY
Qty: 90 CAPSULE | Refills: 0 | Status: SHIPPED | OUTPATIENT
Start: 2024-01-23 | End: 2024-04-25

## 2024-01-23 RX ORDER — HYDROCHLOROTHIAZIDE 12.5 MG/1
12.5 TABLET ORAL DAILY
Qty: 90 TABLET | Refills: 0 | Status: SHIPPED | OUTPATIENT
Start: 2024-01-23 | End: 2024-04-25

## 2024-01-30 DIAGNOSIS — M54.2 CERVICALGIA: ICD-10-CM

## 2024-01-30 DIAGNOSIS — M54.50 LUMBAR PAIN: ICD-10-CM

## 2024-01-30 RX ORDER — GABAPENTIN 100 MG/1
100 CAPSULE ORAL 3 TIMES DAILY
Qty: 270 CAPSULE | Refills: 1 | Status: SHIPPED | OUTPATIENT
Start: 2024-01-30 | End: 2024-05-02 | Stop reason: ALTCHOICE

## 2024-02-16 DIAGNOSIS — M54.50 LUMBAR PAIN: Primary | ICD-10-CM

## 2024-02-16 RX ORDER — METHYLPREDNISOLONE 4 MG/1
TABLET ORAL
Qty: 21 TABLET | Refills: 0 | Status: SHIPPED | OUTPATIENT
Start: 2024-02-16 | End: 2024-02-23

## 2024-02-16 RX ORDER — TRAMADOL HYDROCHLORIDE 50 MG/1
50 TABLET ORAL 3 TIMES DAILY PRN
Qty: 21 TABLET | Refills: 0 | Status: SHIPPED | OUTPATIENT
Start: 2024-02-16 | End: 2024-02-23

## 2024-02-19 ENCOUNTER — TELEMEDICINE (OUTPATIENT)
Dept: PAIN MEDICINE | Facility: HOSPITAL | Age: 69
End: 2024-02-19
Payer: MEDICARE

## 2024-02-19 ENCOUNTER — HOSPITAL ENCOUNTER (OUTPATIENT)
Dept: RADIOLOGY | Facility: HOSPITAL | Age: 69
Discharge: HOME | End: 2024-02-19
Payer: MEDICARE

## 2024-02-19 DIAGNOSIS — Z87.891 PERSONAL HISTORY OF SMOKING: ICD-10-CM

## 2024-02-19 DIAGNOSIS — M54.14 THORACIC RADICULITIS: ICD-10-CM

## 2024-02-19 PROCEDURE — 71271 CT THORAX LUNG CANCER SCR C-: CPT

## 2024-02-19 PROCEDURE — 99213 OFFICE O/P EST LOW 20 MIN: CPT | Performed by: ANESTHESIOLOGY

## 2024-02-19 PROCEDURE — 1125F AMNT PAIN NOTED PAIN PRSNT: CPT | Performed by: ANESTHESIOLOGY

## 2024-02-19 PROCEDURE — 1036F TOBACCO NON-USER: CPT | Performed by: ANESTHESIOLOGY

## 2024-02-19 PROCEDURE — 3008F BODY MASS INDEX DOCD: CPT | Performed by: ANESTHESIOLOGY

## 2024-02-19 PROCEDURE — 71271 CT THORAX LUNG CANCER SCR C-: CPT | Performed by: RADIOLOGY

## 2024-02-19 ASSESSMENT — PAIN SCALES - GENERAL: PAINLEVEL: 6

## 2024-02-19 NOTE — PROGRESS NOTES
Chief complaint: Mid back pain    HPI   Dinora did not she is a 69-year-old female with a history of low back pain as well as thoracic pain.  Patient has thoracolumbar scoliosis.  She has most high-grade narrowing at the L4 level in the low back.  She has struggled with the thoracic area over the past year or 2 especially.  When she first saw me for formal pain management treatment we did discuss that her low back pain was more significant in the mid back at that time.  She has had interventional pain procedures for the low back which significantly reduce her pain and improve her function.  Last injection of the low back was in December of last year.  She says that the thoracic pain is moderate to severe in nature and can be as bad as an 8 out of 10.  She had very severe pain last week and she was given a steroid pack to help quiet things down but unfortunately her pain persists to be moderate to severe.  Pain is around the bra line and worse with sitting, sitting backwards against her chair, putting any pressure on her spine can make it worse.  She says that sometimes she gets a pressure sensation that comes around the ribs.  She has been aching all week.  She has tried muscle relaxers which helped and she tried heat which did not help at all.  She does formal physical therapy exercises on a daily basis and has done so for the past 6 months or more.  She did do formal physical therapy for both her thoracic and mid back.  After she completed mid back physical therapy we obtain an MRI in October of last year which showed multilevel degenerative changes in the thoracic scoliosis.  No imminent surgical lesion.    ROS: 13 point review of systems is complete and is negative listed above in HPI    Imaging:  MRI of thoracic spine from 10/9/2023 reviewed personally in PACS.  Most significant height loss at T7 and T8.    Impression/Plan:  69-year-old female with history of thoracolumbar scoliosis, spinal stenosis, and  multilevel degenerative changes in the thoracolumbar spine.  We discussed the potential for further treatment in the form of an epidural.    -Will plan for thoracic epidural.  Procedure, risk, benefits, alternatives reviewed.  Likely targeting T7-8 area.      -Encouraged to keep up with physical therapy and core strengthening exercises directed at the low back and thoracic areas.

## 2024-02-22 DIAGNOSIS — I10 ESSENTIAL HYPERTENSION: ICD-10-CM

## 2024-02-22 DIAGNOSIS — J30.9 ALLERGIC RHINITIS, UNSPECIFIED SEASONALITY, UNSPECIFIED TRIGGER: ICD-10-CM

## 2024-02-22 RX ORDER — LISINOPRIL 20 MG/1
20 TABLET ORAL DAILY
Qty: 90 TABLET | Refills: 0 | Status: SHIPPED | OUTPATIENT
Start: 2024-02-22 | End: 2024-05-22

## 2024-02-22 RX ORDER — GABAPENTIN 300 MG/1
300 CAPSULE ORAL 3 TIMES DAILY
Qty: 90 CAPSULE | Refills: 11 | Status: SHIPPED | OUTPATIENT
Start: 2024-02-22 | End: 2025-02-21

## 2024-02-22 RX ORDER — MONTELUKAST SODIUM 10 MG/1
10 TABLET ORAL NIGHTLY
Qty: 90 TABLET | Refills: 0 | Status: SHIPPED | OUTPATIENT
Start: 2024-02-22 | End: 2024-05-22

## 2024-02-29 ENCOUNTER — APPOINTMENT (OUTPATIENT)
Dept: PAIN MEDICINE | Facility: HOSPITAL | Age: 69
End: 2024-02-29
Payer: MEDICARE

## 2024-03-01 ENCOUNTER — TELEPHONE (OUTPATIENT)
Dept: PHARMACY | Facility: HOSPITAL | Age: 69
End: 2024-03-01
Payer: MEDICARE

## 2024-03-02 NOTE — TELEPHONE ENCOUNTER
Population Health: Outreach by Ambulatory Pharmacy Team    Payor: Liliana [employer]ARP  Reason: Adherence  Medication: atorvastatin 40 mg, lisinopril 20 mg  Outcome: Patient Reached: Claims Adherence, no questions regarding side effects, or cost concerns. Both medications just filled x 30 days on 02/05/2024 and 02/20/2024     Carmen Sherwood, SnehaD

## 2024-03-04 DIAGNOSIS — E78.2 COMBINED HYPERLIPIDEMIA: ICD-10-CM

## 2024-03-04 RX ORDER — ATORVASTATIN CALCIUM 40 MG/1
40 TABLET, FILM COATED ORAL NIGHTLY
Qty: 90 TABLET | Refills: 0 | Status: SHIPPED | OUTPATIENT
Start: 2024-03-04 | End: 2024-06-03

## 2024-03-08 ENCOUNTER — HOSPITAL ENCOUNTER (OUTPATIENT)
Dept: RADIOLOGY | Facility: HOSPITAL | Age: 69
Discharge: HOME | End: 2024-03-08
Payer: MEDICARE

## 2024-03-08 VITALS
HEART RATE: 87 BPM | WEIGHT: 103 LBS | RESPIRATION RATE: 16 BRPM | HEIGHT: 61 IN | SYSTOLIC BLOOD PRESSURE: 118 MMHG | TEMPERATURE: 98.4 F | DIASTOLIC BLOOD PRESSURE: 69 MMHG | OXYGEN SATURATION: 96 % | BODY MASS INDEX: 19.45 KG/M2

## 2024-03-08 DIAGNOSIS — M54.14 THORACIC RADICULITIS: ICD-10-CM

## 2024-03-08 PROCEDURE — 62321 NJX INTERLAMINAR CRV/THRC: CPT | Performed by: ANESTHESIOLOGY

## 2024-03-08 PROCEDURE — 2500000004 HC RX 250 GENERAL PHARMACY W/ HCPCS (ALT 636 FOR OP/ED): Performed by: ANESTHESIOLOGY

## 2024-03-08 RX ORDER — MIDAZOLAM HYDROCHLORIDE 1 MG/ML
INJECTION INTRAMUSCULAR; INTRAVENOUS
Status: COMPLETED | OUTPATIENT
Start: 2024-03-08 | End: 2024-03-08

## 2024-03-08 RX ADMIN — MIDAZOLAM HYDROCHLORIDE 2 MG: 1 INJECTION INTRAMUSCULAR; INTRAVENOUS at 14:46

## 2024-03-08 ASSESSMENT — PAIN - FUNCTIONAL ASSESSMENT
PAIN_FUNCTIONAL_ASSESSMENT: 0-10

## 2024-03-08 ASSESSMENT — PAIN SCALES - GENERAL
PAINLEVEL_OUTOF10: 0 - NO PAIN
PAINLEVEL_OUTOF10: 4
PAINLEVEL_OUTOF10: 4
PAINLEVEL_OUTOF10: 0 - NO PAIN
PAINLEVEL_OUTOF10: 6

## 2024-03-08 NOTE — PROCEDURES
Preoperative diagnosis/postoperative diagnosis: Thoracic spondylosis, compression defect  Procedure: T8-9 epidural steroid injection under fluoroscopic guidance  Surgeon: Cheyanne Vargas  Assistant:  Fellow Neel García  Anesthesia: Local  Complications: Apparently none    Clinical note: Dinora is a 69-year-old female with a history of thoracic pain she has known compression areas at T7 and 8.  She previously had discussed pursuing an epidural but in the past the low back has been the primary issue.  Only at this time has the thoracic area has been more significant.    Procedure note: The patient was met in the preoperative holding area after risks benefits and alternatives to procedure were discussed with the patient, informed consent was obtained. Patient brought back to the procedure room and placed in the prone position on the fluoroscopy table. Area over the thoracic spine was exposed, prepped, draped, in the usual sterile fashion.  Skin and subcutaneous tissues to the interlaminar space where she had the most significant pain was marked and anesthetized to the intralaminar space was anesthetized using 0.5% lidocaine.  An 18-gauge Tuohy needle was inserted in the skin and advanced into the interlaminar space. A glass syringe was used to achieve the epidural space using the loss resistance technique. Contrast was injected which showed appropriate epidural spread, no intravascular or intrathecal uptake.  Contrast checked in the AP and lateral views.  A total of 2 mL's of 0.5% lidocaine mixed with 40 mg methylprednisolone was injected. Needle removed, bandage applied, patient tolerated the procedure well with no immediate complications.

## 2024-03-08 NOTE — H&P
History Of Present Illness  Dinora Elias is a 69 y.o. female presents for procedure state below. Endorses no changes in past medical history or medical health since last seen in clinic.     Past Medical History  She has a past medical history of Acute gingivitis, plaque induced (12/18/2014), Encounter for screening for malignant neoplasm of colon (11/03/2014), HTN (hypertension), Other specified disorders of teeth and supporting structures (01/04/2016), Otitis media, unspecified, right ear (01/25/2018), Personal history of other diseases of the circulatory system, Personal history of other diseases of the nervous system and sense organs (05/29/2015), Personal history of other diseases of the respiratory system, Personal history of other diseases of the respiratory system (06/10/2016), Personal history of other specified conditions (09/11/2014), and Unspecified otitis externa, unspecified ear (09/26/2018).    Surgical History  She has a past surgical history that includes Breast biopsy (10/01/2013) and Total vaginal hysterectomy (10/01/2013).     Social History  She reports that she quit smoking about 14 months ago. Her smoking use included cigarettes. She has never used smokeless tobacco. She reports current alcohol use. She reports that she does not use drugs.    Family History  Family History   Problem Relation Name Age of Onset    Lung cancer Mother      Other (cerebromeningeal hemmorrhage) Father      Breast cancer Sister  64        Allergies  Patient has no known allergies.    Review of Symptoms:   Constitutional: Negative for chills, diaphoresis or fever  HENT: Negative for neck swelling  Eyes:.  Negative for eye pain  Respiratory:.  Negative for cough, shortness of breath or wheezing    Cardiovascular:.  Negative for chest pain or palpitations  Gastrointestinal:.  Negative for abdominal pain, nausea and vomiting  Genitourinary:.  Negative for urgency  Musculoskeletal:  Positive for back pain. Positive  for joint pain. Denies falls within the past 3 months.  Skin: Negative for wounds or itching   Neurological: Negative for dizziness, seizures, loss of consciousness and weakness  Endo/Heme/Allergies: Does not bruise/bleed easily  Psychiatric/Behavioral: Negative for depression. The patient does not appear anxious.       PHYSICAL EXAM  Vitals signs reviewed  Constitutional:       General: Not in acute distress     Appearance: Normal appearance. Not ill-appearing.  HENT:     Head: Normocephalic and atraumatic  Eyes:     Conjunctiva/sclera: Conjunctivae normal  Cardiovascular:     Rate and Rhythm: Normal rate and regular rhythm  Pulmonary:     Effort: No respiratory distress  Abdominal:     Palpations: Abdomen is soft  Musculoskeletal: PICKETT  Skin:     General: Skin is warm and dry  Neurological:     General: No focal deficit present  Psychiatric:         Mood and Affect: Mood normal         Behavior: Behavior normal     Last Recorded Vitals  /76   Pulse 78   Temp 36 °C (96.8 °F)   Resp 17   Wt 46.7 kg (103 lb)   SpO2 97%     Relevant Results  Current Outpatient Medications   Medication Instructions    amLODIPine (NORVASC) 2.5 mg, oral, Daily    atorvastatin (LIPITOR) 40 mg, oral, Nightly    cholecalciferol (VITAMIN D-3) 5,000 Units, oral, Daily    denosumab (Prolia) 60 mg/mL syringe INJECT 60MG (1ML) SUBCUTANEOUSLY EVERY 6 MONTHS.    EPINEPHrine 0.3 mg/0.3 mL injection syringe injection    fexofenadine (Allegra) 180 mg tablet 1 tablet, oral, Daily    gabapentin (NEURONTIN) 100 mg, oral, 3 times daily    gabapentin (NEURONTIN) 300 mg, oral, 3 times daily    hydroCHLOROthiazide (MICROZIDE) 12.5 mg, oral, Daily    ipratropium (Atrovent) 42 mcg (0.06 %) nasal spray nasal    lisinopril 20 mg, oral, Daily    montelukast (SINGULAIR) 10 mg, oral, Nightly    omeprazole (PRILOSEC) 40 mg, oral, Daily    tiZANidine (ZANAFLEX) 2 mg, oral, Every 8 hours PRN    Trelegy Ellipta 100-62.5-25 mcg blister with device 1 puff,  inhalation, Daily         MR lumbar spine wo IV contrast 05/24/2023    Narrative  Interpreted By:  KATT OJEDA MD  MRN: [id number]  Patient Name: JEFFREY MCKNIGHT    STUDY:  MRI L-SPINE WO;  5/24/2023 2:48 pm    INDICATION:  back pain, DDD.    COMPARISON:  Radiograph 02/07/2023.    ACCESSION NUMBER(S):  [id number]    ORDERING CLINICIAN:  ERICA URBINA    TECHNIQUE:  Sagittal T1, T2, STIR, axial T1 and T2 weighted images of the lumbar  spine were acquired.    FINDINGS:  For counting purposes the last lumbarized vertebral body is labeled  L5.    There is S shaped scoliosis of the lumbar spine. There is  levoconvexity of the upper lumbar spine and dextro convexity of the  lower lumbar spine.    There is grade 1 anterolisthesis of L4 on L5. Alignment, vertebral  body heights and marrow signal pattern otherwise within normal  limits. Edema within the endplates at L4-L5 is likely degenerative.  There is desiccated disc signal throughout the lumbar spine with  moderate to severe disc height loss at L1-L2 and L4-L5. The conus  terminates at L1-L2 and is unremarkable.    Incompletely evaluated T2 hyperintense lesion within the liver may  represent a cyst. Evaluation of the paraspinal soft tissues is  otherwise unremarkable.    Evaluation by level:    T12-L1: No significant spinal canal or neural foraminal stenosis.    L1-L2: Right eccentric disc bulge and facet arthrosis. No significant  spinal canal stenosis. No significant left and moderate right neural  foraminal stenosis.    L2-L3: Disc bulge, facet arthrosis and ligamentum flavum thickening.  Mild spinal canal stenosis. Moderate right and mild-to-moderate left  neural foraminal stenosis.    L3-L4: Disc bulge, facet arthrosis and ligamentum flavum thickening.  Mild spinal canal stenosis. Moderate left and mild-to-moderate right  neural foraminal stenosis.    L4-L5: Disc bulge, facet arthrosis and ligamentum flavum thickening.  Severe spinal canal stenosis. Severe left  and no significant right  neural foraminal stenosis.    L5-S1: No significant spinal canal or neural foraminal stenosis.    Impression  S shaped scoliosis of the lumbar spine with multilevel degenerative  disc disease and facet arthrosis. There is severe spinal canal and  severe left neural foraminal stenosis at L4-L5.      MR cervical spine wo IV contrast 05/24/2023    Narrative  Interpreted By:  KATT OJEDA MD  MRN: [id number]  Patient Name: JEFFREY MCKNIGHT    STUDY:  MRI CERVICAL WO;  5/24/2023 2:43 pm    INDICATION:  severe c-spine degenerative disease on x-ray, neck pain.    COMPARISON:  Radiograph 02/07/2023.    ACCESSION NUMBER(S):  [id number]    ORDERING CLINICIAN:  ERICA URBINA    TECHNIQUE:  Sagittal T1, T2, STIR, axial T1 and axial T2 weighted images were  acquired through the cervical spine.    FINDINGS:  Craniocervical junction is within normal limits. Cerebellar tonsils  are above the foramen magnum. There is straightening of the normal  cervical lordosis. There is trace anterolisthesis of C3 on C4 and C4  on C5. Alignment, vertebral body heights and marrow signal pattern  are otherwise within normal limits. There is desiccated disc signal  throughout the cervical spine with moderate disc height loss  degenerative endplate changes at C5-C6 and C6-C7. Cervical cord is  unremarkable. Prevertebral soft tissues are not thickened.    Evaluation by level:    C1-C2: No significant spinal canal stenosis    C2-C3: Mild disc osteophyte complex, uncovertebral joint hypertrophy  and facet arthrosis. No significant spinal canal stenosis. Mild left  and no significant right neural foraminal stenosis    C3-C4: Mild left eccentric disc osteophyte complex, uncovertebral  joint hypertrophy and facet arthrosis. Mild spinal canal stenosis.  Moderate left and mild right neural foraminal stenosis    C4-C5: Disc osteophyte complex, uncovertebral joint hypertrophy and  facet arthrosis. Moderate spinal canal stenosis.  Moderate to severe  right and no significant left neural foraminal stenosis    C5-C6: Disc osteophyte complex, uncovertebral joint hypertrophy and  facet arthrosis. Moderate spinal canal and neural foraminal stenosis.    C6-C7: Disc osteophyte complex, uncovertebral joint hypertrophy and  facet arthrosis. Mild spinal canal stenosis. Moderate left and  mild-to-moderate right neural foraminal stenosis    C7-T1: No significant spinal canal or neural foraminal stenosis.    Impression  Multilevel degenerative disc disease and facet arthrosis with  moderate spinal canal stenosis at C4-C5, and C5-C6. There is moderate  to severe right neural foraminal stenosis at C4-C5 and moderate  neural foraminal stenosis at C3-C4 on the left, C5-C6 and C6-C7  bilaterally.     No image results found.       1. Thoracic radiculitis  FL pain management TC    FL pain management TC    Epidural Steroid Injection    Epidural Steroid Injection           ASSESSMENT/PLAN  Dinora Elias is a 69 y.o. female presents for a thoracic interlaminar epidural steroid injection    Our plan is as follows:  - Follow In pain clinic  - Continue to participate in physical therapy as well as physician directed home exercises  - Continue pain medications as prescribed       Neel García MD

## 2024-04-11 ENCOUNTER — OFFICE VISIT (OUTPATIENT)
Dept: PAIN MEDICINE | Facility: HOSPITAL | Age: 69
End: 2024-04-11
Payer: MEDICARE

## 2024-04-11 DIAGNOSIS — M54.50 LUMBAR PAIN: ICD-10-CM

## 2024-04-11 PROCEDURE — 99213 OFFICE O/P EST LOW 20 MIN: CPT | Performed by: ANESTHESIOLOGY

## 2024-04-11 PROCEDURE — 1125F AMNT PAIN NOTED PAIN PRSNT: CPT | Performed by: ANESTHESIOLOGY

## 2024-04-11 PROCEDURE — 3008F BODY MASS INDEX DOCD: CPT | Performed by: ANESTHESIOLOGY

## 2024-04-11 ASSESSMENT — PAIN SCALES - GENERAL: PAINLEVEL: 9

## 2024-04-11 NOTE — PROGRESS NOTES
Chief Complaint   Patient presents with    Back Pain    Neck Pain     History Of Present Illness  Dinora Elias is a 69 y.o. female with a past medical history of hypertension, CAD, smoker, lumbar spinal stenosis with neurogenic claudication, osteoporosis, presents to pain clinic for continued management of low and mid back pain.  Patient has a history of thoracolumbar scoliosis with high-grade narrowing at the L4 level in her low back.  In regards to her low back pain she describes axial aching pain that is worse with prolonged standing and sitting.  She does not endorse any radicular symptoms at this time. She has responded well to bilateral L4 transforaminal injections the last one which was done in December of last year. She states this injection gave her greater than 50% relief for more than 4 months.  Subsequently she had focused pain in her thoracic spine, and we proceeded to do a T8-9 epidural steroid injection in March.  Patient states this injection provided around 50% relief however it feels like it is wearing off at this time.  She describes the pain as a gripping pain across her mid back that is constant.  Patient presents to pain clinic today for addressing her lumbar spine and possibly repeat injection. MRI of thoracic spine showing degenerative changes without significant spinal canal or neuroforaminal narrowing.  Regards to her lumbar spine there is severe spinal canal stenosis at L4-5 with severe left neuroforaminal stenosis at L4. For medications patient takes gabapentin 300 mg 3 times daily. The pain causes significant stress in the patient's life, specifically interferes with general activity, mood, walking ability, ability to perform tasks at home and/or work.  Patient participates in physical therapy and continues to perform physician directed exercises at home. Denies any bowel or bladder incontinence, saddle anesthesia, worsening pain, weakness or falls.     Past Medical History  She  has a past medical history of Acute gingivitis, plaque induced (12/18/2014), Encounter for screening for malignant neoplasm of colon (11/03/2014), HTN (hypertension), Other specified disorders of teeth and supporting structures (01/04/2016), Otitis media, unspecified, right ear (01/25/2018), Personal history of other diseases of the circulatory system, Personal history of other diseases of the nervous system and sense organs (05/29/2015), Personal history of other diseases of the respiratory system, Personal history of other diseases of the respiratory system (06/10/2016), Personal history of other specified conditions (09/11/2014), and Unspecified otitis externa, unspecified ear (09/26/2018).    Surgical History  She has a past surgical history that includes Breast biopsy (10/01/2013) and Total vaginal hysterectomy (10/01/2013).     Social History  She reports that she quit smoking about 15 months ago. Her smoking use included cigarettes. She has never used smokeless tobacco. She reports current alcohol use. She reports that she does not use drugs.    Family History  Family History   Problem Relation Name Age of Onset    Lung cancer Mother      Other (cerebromeningeal hemmorrhage) Father      Breast cancer Sister  64        Allergies  Patient has no known allergies.    Review of Symptoms:   Constitutional: Negative for chills, diaphoresis or fever  HENT: Negative for neck swelling  Eyes:.  Negative for eye pain  Respiratory:.  Negative for cough, shortness of breath or wheezing    Cardiovascular:.  Negative for chest pain or palpitations  Gastrointestinal:.  Negative for abdominal pain, nausea and vomiting  Genitourinary:.  Negative for urgency  Musculoskeletal:  Positive for back pain. Positive for joint pain. Denies falls within the past 3 months.  Skin: Negative for wounds or itching   Neurological: Negative for dizziness, seizures, loss of consciousness and weakness  Endo/Heme/Allergies: Does not bruise/bleed  easily  Psychiatric/Behavioral: Negative for depression. The patient does not appear anxious.       PHYSICAL EXAM  Vitals signs reviewed  Constitutional:       General: Not in acute distress     Appearance: Normal appearance. Not ill-appearing.  HENT:     Head: Normocephalic and atraumatic  Eyes:     Conjunctiva/sclera: Conjunctivae normal  Cardiovascular:     Rate and Rhythm: Normal rate and regular rhythm  Pulmonary:     Effort: No respiratory distress  Abdominal:     Palpations: Abdomen is soft  Musculoskeletal: PICKETT  Skin:     General: Skin is warm and dry  Neurological:     General: No focal deficit present  Psychiatric:         Mood and Affect: Mood normal         Behavior: Behavior normal    Advanced Exam   Inspection: No gross deformities, no surgical scars  Palpation: No tenderness of patient of lumbar midline, lumbar paraspinals, bilateral SI joints  ROM: Normal range of motion of the lumbar flexion extension  Motor: 5-5 strength upper and lower extremities  Sensory: Negative for sensory abnormalities in upper and lower extremities  Reflexes: 2+ reflexes bilateral upper and lower extremities  Lumbar: Negative straight leg raising bilaterally, negative for facet loading  Sacral: Negative Justina, negative Gaenslen's  Hip: Negative for pain with anterior, lateral, posterior palpation of hip joints, negative FADIR, negative for internal/external rotation of the hip, negative logroll     Last Recorded Vitals  There were no vitals taken for this visit.    Relevant Results  Current Outpatient Medications   Medication Instructions    amLODIPine (NORVASC) 2.5 mg, oral, Daily    atorvastatin (LIPITOR) 40 mg, oral, Nightly    cholecalciferol (VITAMIN D-3) 5,000 Units, oral, Daily    denosumab (Prolia) 60 mg/mL syringe INJECT 60MG (1ML) SUBCUTANEOUSLY EVERY 6 MONTHS.    EPINEPHrine 0.3 mg/0.3 mL injection syringe injection    fexofenadine (Allegra) 180 mg tablet 1 tablet, oral, Daily    gabapentin (NEURONTIN) 100 mg,  oral, 3 times daily    gabapentin (NEURONTIN) 300 mg, oral, 3 times daily    hydroCHLOROthiazide (MICROZIDE) 12.5 mg, oral, Daily    ipratropium (Atrovent) 42 mcg (0.06 %) nasal spray nasal    lisinopril 20 mg, oral, Daily    montelukast (SINGULAIR) 10 mg, oral, Nightly    omeprazole (PRILOSEC) 40 mg, oral, Daily    tiZANidine (ZANAFLEX) 2 mg, oral, Every 8 hours PRN    Trelegy Ellipta 100-62.5-25 mcg blister with device 1 puff, inhalation, Daily         MR lumbar spine wo IV contrast 05/24/2023    Narrative  Interpreted By:  KATT OJEDA MD  MRN: [id number]  Patient Name: JEFFREY MCKNIGHT    STUDY:  MRI L-SPINE WO;  5/24/2023 2:48 pm    INDICATION:  back pain, DDD.    COMPARISON:  Radiograph 02/07/2023.    ACCESSION NUMBER(S):  [id number]    ORDERING CLINICIAN:  ERICA URBINA    TECHNIQUE:  Sagittal T1, T2, STIR, axial T1 and T2 weighted images of the lumbar  spine were acquired.    FINDINGS:  For counting purposes the last lumbarized vertebral body is labeled  L5.    There is S shaped scoliosis of the lumbar spine. There is  levoconvexity of the upper lumbar spine and dextro convexity of the  lower lumbar spine.    There is grade 1 anterolisthesis of L4 on L5. Alignment, vertebral  body heights and marrow signal pattern otherwise within normal  limits. Edema within the endplates at L4-L5 is likely degenerative.  There is desiccated disc signal throughout the lumbar spine with  moderate to severe disc height loss at L1-L2 and L4-L5. The conus  terminates at L1-L2 and is unremarkable.    Incompletely evaluated T2 hyperintense lesion within the liver may  represent a cyst. Evaluation of the paraspinal soft tissues is  otherwise unremarkable.    Evaluation by level:    T12-L1: No significant spinal canal or neural foraminal stenosis.    L1-L2: Right eccentric disc bulge and facet arthrosis. No significant  spinal canal stenosis. No significant left and moderate right neural  foraminal stenosis.    L2-L3: Disc bulge,  facet arthrosis and ligamentum flavum thickening.  Mild spinal canal stenosis. Moderate right and mild-to-moderate left  neural foraminal stenosis.    L3-L4: Disc bulge, facet arthrosis and ligamentum flavum thickening.  Mild spinal canal stenosis. Moderate left and mild-to-moderate right  neural foraminal stenosis.    L4-L5: Disc bulge, facet arthrosis and ligamentum flavum thickening.  Severe spinal canal stenosis. Severe left and no significant right  neural foraminal stenosis.    L5-S1: No significant spinal canal or neural foraminal stenosis.    Impression  S shaped scoliosis of the lumbar spine with multilevel degenerative  disc disease and facet arthrosis. There is severe spinal canal and  severe left neural foraminal stenosis at L4-L5.      MR cervical spine wo IV contrast 05/24/2023    Narrative  Interpreted By:  KATT OJEDA MD  MRN: [id number]  Patient Name: JEFFREY MCKNIGHT    STUDY:  MRI CERVICAL WO;  5/24/2023 2:43 pm    INDICATION:  severe c-spine degenerative disease on x-ray, neck pain.    COMPARISON:  Radiograph 02/07/2023.    ACCESSION NUMBER(S):  [id number]    ORDERING CLINICIAN:  ERICA URBINA    TECHNIQUE:  Sagittal T1, T2, STIR, axial T1 and axial T2 weighted images were  acquired through the cervical spine.    FINDINGS:  Craniocervical junction is within normal limits. Cerebellar tonsils  are above the foramen magnum. There is straightening of the normal  cervical lordosis. There is trace anterolisthesis of C3 on C4 and C4  on C5. Alignment, vertebral body heights and marrow signal pattern  are otherwise within normal limits. There is desiccated disc signal  throughout the cervical spine with moderate disc height loss  degenerative endplate changes at C5-C6 and C6-C7. Cervical cord is  unremarkable. Prevertebral soft tissues are not thickened.    Evaluation by level:    C1-C2: No significant spinal canal stenosis    C2-C3: Mild disc osteophyte complex, uncovertebral joint hypertrophy  and  facet arthrosis. No significant spinal canal stenosis. Mild left  and no significant right neural foraminal stenosis    C3-C4: Mild left eccentric disc osteophyte complex, uncovertebral  joint hypertrophy and facet arthrosis. Mild spinal canal stenosis.  Moderate left and mild right neural foraminal stenosis    C4-C5: Disc osteophyte complex, uncovertebral joint hypertrophy and  facet arthrosis. Moderate spinal canal stenosis. Moderate to severe  right and no significant left neural foraminal stenosis    C5-C6: Disc osteophyte complex, uncovertebral joint hypertrophy and  facet arthrosis. Moderate spinal canal and neural foraminal stenosis.    C6-C7: Disc osteophyte complex, uncovertebral joint hypertrophy and  facet arthrosis. Mild spinal canal stenosis. Moderate left and  mild-to-moderate right neural foraminal stenosis    C7-T1: No significant spinal canal or neural foraminal stenosis.    Impression  Multilevel degenerative disc disease and facet arthrosis with  moderate spinal canal stenosis at C4-C5, and C5-C6. There is moderate  to severe right neural foraminal stenosis at C4-C5 and moderate  neural foraminal stenosis at C3-C4 on the left, C5-C6 and C6-C7  bilaterally.     No image results found.       No diagnosis found.     ASSESSMENT/PLAN  Dinora Elias is a 69 y.o. female with a past medical history of hypertension, CAD, lumbar spinal stenosis with neurogenic claudication, osteoporosis, presents to pain clinic for continued management of low and mid back pain.  Patient has a history of thoracolumbar scoliosis with high-grade narrowing at the L4 level in her low back.  She has responded well to bilateral L4 transforaminal injections the last one which was done in December of last year. MRI of thoracic spine showing degenerative changes without significant spinal canal or neuroforaminal narrowing. MRI of lumbar spine shows there is severe spinal canal stenosis at L4-5 with severe left neuroforaminal  stenosis at L4, secondary to grade 1 anterolisthesis. Given sustained relief with prior lumbar transforaminal will be reasonable to repeat bilateral L4 transforaminal epidural steroid injections at this time.    Our plan is as follows:  - Bilateral L4 transforaminal epidural steroid injection.  Procedure, risk, benefits, alternatives reviewed.  - Continue to participate in physical therapy as well as physician directed home exercises  - Continue pain medications as prescribed       Serjio Barraza MD

## 2024-04-24 DIAGNOSIS — K21.9 GASTROESOPHAGEAL REFLUX DISEASE, UNSPECIFIED WHETHER ESOPHAGITIS PRESENT: ICD-10-CM

## 2024-04-24 DIAGNOSIS — I10 ESSENTIAL HYPERTENSION: ICD-10-CM

## 2024-04-25 DIAGNOSIS — M54.2 CERVICALGIA: ICD-10-CM

## 2024-04-25 RX ORDER — OMEPRAZOLE 40 MG/1
40 CAPSULE, DELAYED RELEASE ORAL DAILY
Qty: 90 CAPSULE | Refills: 1 | Status: SHIPPED | OUTPATIENT
Start: 2024-04-25

## 2024-04-25 RX ORDER — TIZANIDINE 2 MG/1
2 TABLET ORAL EVERY 8 HOURS PRN
Qty: 90 TABLET | Refills: 1 | Status: SHIPPED | OUTPATIENT
Start: 2024-04-25

## 2024-04-25 RX ORDER — HYDROCHLOROTHIAZIDE 12.5 MG/1
12.5 TABLET ORAL DAILY
Qty: 90 TABLET | Refills: 1 | Status: SHIPPED | OUTPATIENT
Start: 2024-04-25

## 2024-04-26 ASSESSMENT — ENCOUNTER SYMPTOMS
DIZZINESS: 0
RHINORRHEA: 0
BACK PAIN: 1
VOMITING: 0
WHEEZING: 0
ACTIVITY CHANGE: 0
COUGH: 0
SINUS PAIN: 0
ABDOMINAL DISTENTION: 0
WEAKNESS: 0
ABDOMINAL PAIN: 0
NAUSEA: 0
CHILLS: 0
ENDOCRINE NEGATIVE: 1
LIGHT-HEADEDNESS: 0
HEMATOLOGIC/LYMPHATIC NEGATIVE: 1
ALLERGIC/IMMUNOLOGIC NEGATIVE: 1
EYES NEGATIVE: 1
PSYCHIATRIC NEGATIVE: 1
DIARRHEA: 0
CONSTIPATION: 0
FATIGUE: 0
FEVER: 0
SINUS PRESSURE: 0
SORE THROAT: 0
CHEST TIGHTNESS: 0
NUMBNESS: 0

## 2024-04-26 NOTE — PROGRESS NOTES
Subjective   Reason for Visit: Dinora Elias is an 69 y.o. female here for a Medicare Wellness visit.     Past Medical, Surgical, and Family History reviewed and updated in chart.    Reviewed all medications by prescribing practitioner or clinical pharmacist (such as prescriptions, OTCs, herbal therapies and supplements) and documented in the medical record.    Medicare Physical  Annual physical  Severe degenerative disc disease C-spine and lumbar spine.  Saw neurosurgery, she is not a candidate for surgery given her osteoporosis. Neurosurgery referred her to pain management, seeing Dr. Cheyanne Vargas for pain, recent injection.  Chronic sinusitis, following with ENT.   Xerostomia, improved. Using Biotene, tongue scraper, following with dentist and ENT. Recommend increase fluid intake, stay well-hydrated. Using dicyclomine rarely.   Allergic rhinitis, following with allergist. Now taking Allegra and Singulair, using ipratropium nasal solution. Receiving allergy shots. She thinks they are helping.  Lung nodules stable, repeat LDCT 2024  Osteopenia and osteoporosis, receiving Prolia every 6 months.  CAD, continue atorvastatin 40 mg daily, aspirin, good blood pressure control. CT chest shows severe coronary calcifications. Normal stress test 2023. CT cardiac score 1134. Following with cardiology.   Hypertension, controlled. Taking lisinopril 10 mg daily and hydrochlorothiazide 12.5 mg daily.  I spent 15 minutes obtaining and discussing depression screening using PHQ-2 questions with results documented in the chart. The screening indicated potential depression and PHQ-9 was obtained with treatment and referral plan discussed. Declines medication.    Preventive:  CRC screen:   Mammogram: 2023 negative  DEXA scan: 2023 osteoporosis  CT cardiac scoring: 3/2023 score 1134  Stress test:  normal  LDCT lung cancer screenin2024        The ASCVD Risk score (Benji DK, et al., 2019) failed to  calculate for the following reasons:    The valid HDL cholesterol range is 20 to 100 mg/dL    Patient Care Team:  ALEXANDRE Capps DNP as PCP - General  ALEXANDRE Capps DNP as PCP - [employer] PCP     No visits with results within 3 Month(s) from this visit.   Latest known visit with results is:   Lab on 12/12/2023   Component Date Value Ref Range Status    WBC 12/12/2023 9.3  4.4 - 11.3 x10*3/uL Final    nRBC 12/12/2023 0.0  0.0 - 0.0 /100 WBCs Final    RBC 12/12/2023 4.28  4.00 - 5.20 x10*6/uL Final    Hemoglobin 12/12/2023 14.3  12.0 - 16.0 g/dL Final    Hematocrit 12/12/2023 43.1  36.0 - 46.0 % Final    MCV 12/12/2023 101 (H)  80 - 100 fL Final    MCH 12/12/2023 33.4  26.0 - 34.0 pg Final    MCHC 12/12/2023 33.2  32.0 - 36.0 g/dL Final    RDW 12/12/2023 13.2  11.5 - 14.5 % Final    Platelets 12/12/2023 373  150 - 450 x10*3/uL Final    Neutrophils % 12/12/2023 74.3  40.0 - 80.0 % Final    Immature Granulocytes %, Automated 12/12/2023 0.4  0.0 - 0.9 % Final    Immature Granulocyte Count (IG) includes promyelocytes, myelocytes and metamyelocytes but does not include bands. Percent differential counts (%) should be interpreted in the context of the absolute cell counts (cells/UL).    Lymphocytes % 12/12/2023 14.7  13.0 - 44.0 % Final    Monocytes % 12/12/2023 9.1  2.0 - 10.0 % Final    Eosinophils % 12/12/2023 1.0  0.0 - 6.0 % Final    Basophils % 12/12/2023 0.5  0.0 - 2.0 % Final    Neutrophils Absolute 12/12/2023 6.90  1.20 - 7.70 x10*3/uL Final    Percent differential counts (%) should be interpreted in the context of the absolute cell counts (cells/uL).    Immature Granulocytes Absolute, Au* 12/12/2023 0.04  0.00 - 0.70 x10*3/uL Final    Lymphocytes Absolute 12/12/2023 1.37  1.20 - 4.80 x10*3/uL Final    Monocytes Absolute 12/12/2023 0.85  0.10 - 1.00 x10*3/uL Final    Eosinophils Absolute 12/12/2023 0.09  0.00 - 0.70 x10*3/uL Final    Basophils Absolute 12/12/2023 0.05   0.00 - 0.10 x10*3/uL Final    Glucose 12/12/2023 95  74 - 99 mg/dL Final    Sodium 12/12/2023 135 (L)  136 - 145 mmol/L Final    Potassium 12/12/2023 4.3  3.5 - 5.3 mmol/L Final    Chloride 12/12/2023 95 (L)  98 - 107 mmol/L Final    Bicarbonate 12/12/2023 31  21 - 32 mmol/L Final    Anion Gap 12/12/2023 13  10 - 20 mmol/L Final    Urea Nitrogen 12/12/2023 15  6 - 23 mg/dL Final    Creatinine 12/12/2023 0.69  0.50 - 1.05 mg/dL Final    eGFR 12/12/2023 >90  >60 mL/min/1.73m*2 Final    Calculations of estimated GFR are performed using the 2021 CKD-EPI Study Refit equation without the race variable for the IDMS-Traceable creatinine methods.  https://jasn.asnjournals.org/content/early/2021/09/22/ASN.4287856903    Calcium 12/12/2023 10.3  8.6 - 10.3 mg/dL Final    Albumin 12/12/2023 4.8  3.4 - 5.0 g/dL Final    Alkaline Phosphatase 12/12/2023 57  33 - 136 U/L Final    Total Protein 12/12/2023 7.2  6.4 - 8.2 g/dL Final    AST 12/12/2023 26  9 - 39 U/L Final    Bilirubin, Total 12/12/2023 0.7  0.0 - 1.2 mg/dL Final    ALT 12/12/2023 24  7 - 45 U/L Final    Patients treated with Sulfasalazine may generate falsely decreased results for ALT.    Cholesterol 12/12/2023 215 (H)  0 - 199 mg/dL Final          Age      Desirable   Borderline High   High     0-19 Y     0 - 169       170 - 199     >/= 200    20-24 Y     0 - 189       190 - 224     >/= 225         >24 Y     0 - 199       200 - 239     >/= 240   **All ranges are based on fasting samples. Specific   therapeutic targets will vary based on patient-specific   cardiac risk.    Pediatric guidelines reference:Pediatrics 2011, 128(S5).Adult guidelines reference: NCEP ATPIII Guidelines,KIMO 2001, 258:2486-97    Venipuncture immediately after or during the administration of Metamizole may lead to falsely low results. Testing should be performed immediately prior to Metamizole dosing.    HDL-Cholesterol 12/12/2023 103.7  mg/dL Final      Age       Very Low   Low     Normal     High    0-19 Y    < 35      < 40     40-45     ----  20-24 Y    ----     < 40      >45      ----        >24 Y      ----     < 40     40-60      >60      Cholesterol/HDL Ratio 12/12/2023 2.1   Final      Ref Values  Desirable  < 3.4  High Risk  > 5.0    LDL Calculated 12/12/2023 92  <=99 mg/dL Final                                Near   Borderline      AGE      Desirable  Optimal    High     High     Very High     0-19 Y     0 - 109     ---    110-129   >/= 130     ----    20-24 Y     0 - 119     ---    120-159   >/= 160     ----      >24 Y     0 -  99   100-129  130-159   160-189     >/=190      VLDL 12/12/2023 19  0 - 40 mg/dL Final    Triglycerides 12/12/2023 96  0 - 149 mg/dL Final       Age         Desirable   Borderline High   High     Very High   0 D-90 D    19 - 174         ----         ----        ----  91 D- 9 Y     0 -  74        75 -  99     >/= 100      ----    10-19 Y     0 -  89        90 - 129     >/= 130      ----    20-24 Y     0 - 114       115 - 149     >/= 150      ----         >24 Y     0 - 149       150 - 199    200- 499    >/= 500    Venipuncture immediately after or during the administration of Metamizole may lead to falsely low results. Testing should be performed immediately prior to Metamizole dosing.    Non HDL Cholesterol 12/12/2023 111  0 - 149 mg/dL Final          Age       Desirable   Borderline High   High     Very High     0-19 Y     0 - 119       120 - 144     >/= 145    >/= 160    20-24 Y     0 - 149       150 - 189     >/= 190      ----         >24 Y    30 mg/dL above LDL Cholesterol goal      Thyroid Stimulating Hormone 12/12/2023 0.95  0.44 - 3.98 mIU/L Final       FL pain management  These images are not reportable by radiology and will not be interpreted   by  Radiologists.       Review of Systems   Constitutional:  Negative for activity change, chills, fatigue and fever.   HENT:  Negative for congestion, rhinorrhea, sinus pressure, sinus pain and sore throat.         Dry  "mouth   Eyes: Negative.    Respiratory:  Negative for cough, chest tightness and wheezing.    Cardiovascular:  Negative for leg swelling.   Gastrointestinal:  Negative for abdominal distention, abdominal pain, constipation, diarrhea, nausea and vomiting.   Endocrine: Negative.    Genitourinary: Negative.    Musculoskeletal:  Positive for arthralgias and back pain.   Skin: Negative.    Allergic/Immunologic: Negative.    Neurological:  Negative for dizziness, syncope, weakness, light-headedness and numbness.   Hematological: Negative.    Psychiatric/Behavioral: Negative.     All other systems reviewed and are negative.      Objective   Vitals:  /64   Pulse 95   Temp 36.3 °C (97.4 °F)   Ht 1.549 m (5' 1\")   Wt 46.8 kg (103 lb 3.2 oz)   SpO2 96%   BMI 19.50 kg/m²       Physical Exam  Vitals and nursing note reviewed.   Constitutional:       General: She is not in acute distress.     Appearance: Normal appearance. She is not ill-appearing.   HENT:      Head: Normocephalic and atraumatic.      Right Ear: Tympanic membrane, ear canal and external ear normal.      Left Ear: Tympanic membrane, ear canal and external ear normal.      Nose: Nose normal.      Mouth/Throat:      Mouth: Mucous membranes are dry.      Pharynx: Oropharynx is clear.   Eyes:      Pupils: Pupils are equal, round, and reactive to light.   Cardiovascular:      Rate and Rhythm: Normal rate and regular rhythm.      Pulses: Normal pulses.      Heart sounds: Normal heart sounds. No murmur heard.  Pulmonary:      Effort: Pulmonary effort is normal. No respiratory distress.      Breath sounds: Normal breath sounds. No wheezing.   Abdominal:      General: Bowel sounds are normal. There is no distension.      Palpations: Abdomen is soft.      Tenderness: There is no abdominal tenderness.   Musculoskeletal:         General: No tenderness. Normal range of motion.      Cervical back: Normal range of motion and neck supple.      Right lower leg: No " edema.      Left lower leg: No edema.   Skin:     General: Skin is warm and dry.      Capillary Refill: Capillary refill takes less than 2 seconds.      Coloration: Skin is not jaundiced.   Neurological:      General: No focal deficit present.      Mental Status: She is alert and oriented to person, place, and time.      Motor: No weakness.   Psychiatric:         Mood and Affect: Mood normal.         Behavior: Behavior normal.         Thought Content: Thought content normal.         Judgment: Judgment normal.         Assessment/Plan    Dinora was seen today for medicare annual wellness visit subsequent, results, osteoporosis and requests handicap placard.  Diagnoses and all orders for this visit:  Routine general medical examination at health care facility (Primary)  Osteoporosis, unspecified osteoporosis type, unspecified pathological fracture presence  Lumbar pain  -     Disability Placard  Chronic thoracic back pain, unspecified back pain laterality  -     Disability Placard  Protein-calorie malnutrition, unspecified severity (Multi)  Pulmonary emphysema, unspecified emphysema type (Multi)  Coronary artery disease involving native coronary artery of native heart without angina pectoris  Body mass index (BMI) 19.9 or less, adult  Combined hyperlipidemia  Essential hypertension  Advanced directives, counseling/discussion  Depression screening  Other orders  -     Follow Up In Primary Care - Medicare Annual  -     Follow Up In Primary Care - Established; Future  -     denosumab (Prolia) injection 60 mg  -     sodium chloride 0.9 % bolus 500 mL  -     dextrose 5 % in water (D5W) bolus  -     diphenhydrAMINE (BENADryl) injection 50 mg  -     methylPREDNISolone sod succinate (SOLU-Medrol) 40 mg/mL injection 40 mg  -     famotidine PF (Pepcid) injection 20 mg  -     EPINEPHrine (Epipen) injection syringe 0.3 mg  -     albuterol 2.5 mg /3 mL (0.083 %) nebulizer solution 3 mL     # Lumbar and c-spine DDD  -Not a surgical  "candidate  -Following with pain management  # Hypertension, controlled  -Continue amlodipine 2.5 mg daily, lisinopril 20 mg daily, HCTZ 12.5 mg daily  -Low fat/cholesterol, low sodium, low carbohydrate diet  -Exercise as tolerated 150 minutes/week, increase activity slowly  -Maintain healthy weight  # CAD, hyperlipidemia  -BP control  -Continue atorvastatin 40 mg daily  -Low fat/low cholesterol diet  -Eat more fresh foods  -Avoid processed/prepackaged/fast foods  -Gradually increase physical activity  -Weight loss  # Osteoporosis  -Prolia every 6 months  # Xerostomia, improving  -Stay well-hydrated  -Continue over-the-counter Biotene or other dry mouth product  # GERD  -Continue omeprazole 40 mg daily  -Avoid alcohol, caffeine, and other foods that tend to bother your stomach  -Elevate head of bed 4-6\" on blocks  -Avoid eating 2 hours prior to bed  -Do not wear constricting clothing around your middle  # Former smoker  -Low dose CT for lung cancer screening due 2/2025  # Allergic rhinitis  -Follow with allergy/immunology  -Continue Singulair, Allegra, ipratropium  # Chronic sinusitis  -Follow with ENT  # Underweight  -Eat regular meals  # COPD  -Continue Trelegy    Follow-up 6 months and as needed  "

## 2024-05-01 ENCOUNTER — HOSPITAL ENCOUNTER (OUTPATIENT)
Dept: RADIOLOGY | Facility: HOSPITAL | Age: 69
Discharge: HOME | End: 2024-05-01
Payer: MEDICARE

## 2024-05-01 VITALS
SYSTOLIC BLOOD PRESSURE: 117 MMHG | DIASTOLIC BLOOD PRESSURE: 69 MMHG | OXYGEN SATURATION: 98 % | RESPIRATION RATE: 16 BRPM | BODY MASS INDEX: 19.45 KG/M2 | WEIGHT: 103 LBS | HEIGHT: 61 IN | HEART RATE: 85 BPM | TEMPERATURE: 98 F

## 2024-05-01 DIAGNOSIS — M54.50 LUMBAR PAIN: ICD-10-CM

## 2024-05-01 PROCEDURE — 2500000004 HC RX 250 GENERAL PHARMACY W/ HCPCS (ALT 636 FOR OP/ED): Performed by: ANESTHESIOLOGY

## 2024-05-01 PROCEDURE — 64483 NJX AA&/STRD TFRM EPI L/S 1: CPT | Mod: 50 | Performed by: ANESTHESIOLOGY

## 2024-05-01 PROCEDURE — 64483 NJX AA&/STRD TFRM EPI L/S 1: CPT | Performed by: ANESTHESIOLOGY

## 2024-05-01 RX ORDER — MIDAZOLAM HYDROCHLORIDE 1 MG/ML
INJECTION INTRAMUSCULAR; INTRAVENOUS
Status: COMPLETED | OUTPATIENT
Start: 2024-05-01 | End: 2024-05-01

## 2024-05-01 RX ADMIN — MIDAZOLAM HYDROCHLORIDE 2 MG: 1 INJECTION INTRAMUSCULAR; INTRAVENOUS at 14:12

## 2024-05-01 ASSESSMENT — PAIN SCALES - GENERAL
PAINLEVEL_OUTOF10: 9
PAINLEVEL_OUTOF10: 0 - NO PAIN
PAINLEVEL_OUTOF10: 0 - NO PAIN
PAINLEVEL_OUTOF10: 6
PAINLEVEL_OUTOF10: 4
PAINLEVEL_OUTOF10: 2

## 2024-05-01 ASSESSMENT — PAIN - FUNCTIONAL ASSESSMENT
PAIN_FUNCTIONAL_ASSESSMENT: 0-10
PAIN_FUNCTIONAL_ASSESSMENT: 0-10

## 2024-05-01 NOTE — H&P
Pain Management H&P    History Of Present Illness  Dinora Elias is a 69 y.o. female presents for procedure state below. Endorses no changes in past medical history or medical health since last seen in clinic.      Past Medical History  She has a past medical history of Acute gingivitis, plaque induced (12/18/2014), Encounter for screening for malignant neoplasm of colon (11/03/2014), HTN (hypertension), Other specified disorders of teeth and supporting structures (01/04/2016), Otitis media, unspecified, right ear (01/25/2018), Personal history of other diseases of the circulatory system, Personal history of other diseases of the nervous system and sense organs (05/29/2015), Personal history of other diseases of the respiratory system, Personal history of other diseases of the respiratory system (06/10/2016), Personal history of other specified conditions (09/11/2014), and Unspecified otitis externa, unspecified ear (09/26/2018).    Surgical History  She has a past surgical history that includes Breast biopsy (10/01/2013) and Total vaginal hysterectomy (10/01/2013).     Social History  She reports that she quit smoking about 15 months ago. Her smoking use included cigarettes. She has never used smokeless tobacco. She reports current alcohol use. She reports that she does not use drugs.    Family History  Family History   Problem Relation Name Age of Onset    Lung cancer Mother      Other (cerebromeningeal hemmorrhage) Father      Breast cancer Sister  64        Allergies  Patient has no known allergies.    Review of Symptoms:   Constitutional: Negative for chills, diaphoresis or fever  HENT: Negative for neck swelling  Eyes:.  Negative for eye pain  Respiratory:.  Negative for cough, shortness of breath or wheezing    Cardiovascular:.  Negative for chest pain or palpitations  Gastrointestinal:.  Negative for abdominal pain, nausea and vomiting  Genitourinary:.  Negative for urgency  Musculoskeletal:  Positive  for back pain. Positive for joint pain. Denies falls within the past 3 months.  Skin: Negative for wounds or itching   Neurological: Negative for dizziness, seizures, loss of consciousness and weakness  Endo/Heme/Allergies: Does not bruise/bleed easily  Psychiatric/Behavioral: Negative for depression. The patient does not appear anxious.       PHYSICAL EXAM  Vitals signs reviewed  Constitutional:       General: Not in acute distress     Appearance: Normal appearance. Not ill-appearing.  HENT:     Head: Normocephalic and atraumatic  Eyes:     Conjunctiva/sclera: Conjunctivae normal  Cardiovascular:     Rate and Rhythm: Normal rate and regular rhythm  Pulmonary:     Effort: No respiratory distress  Abdominal:     Palpations: Abdomen is soft  Musculoskeletal: PICKETT  Skin:     General: Skin is warm and dry  Neurological:     General: No focal deficit present  Psychiatric:         Mood and Affect: Mood normal         Behavior: Behavior normal     Last Recorded Vitals  /76   Pulse 85   Temp 36.4 °C (97.6 °F) (Temporal)   Resp 16   Wt 46.7 kg (103 lb)   SpO2 100%     Relevant Results  Current Outpatient Medications   Medication Instructions    amLODIPine (NORVASC) 2.5 mg, oral, Daily    atorvastatin (LIPITOR) 40 mg, oral, Nightly    cholecalciferol (VITAMIN D-3) 5,000 Units, oral, Daily    denosumab (Prolia) 60 mg/mL syringe INJECT 60MG (1ML) SUBCUTANEOUSLY EVERY 6 MONTHS.    EPINEPHrine 0.3 mg/0.3 mL injection syringe injection    fexofenadine (Allegra) 180 mg tablet 1 tablet, oral, Daily    gabapentin (NEURONTIN) 100 mg, oral, 3 times daily    gabapentin (NEURONTIN) 300 mg, oral, 3 times daily    hydroCHLOROthiazide (MICROZIDE) 12.5 mg, oral, Daily    ipratropium (Atrovent) 42 mcg (0.06 %) nasal spray nasal    lisinopril 20 mg, oral, Daily    montelukast (SINGULAIR) 10 mg, oral, Nightly    omeprazole (PRILOSEC) 40 mg, oral, Daily    tiZANidine (ZANAFLEX) 2 mg, oral, Every 8 hours PRN    Trelegy Ellipta  100-62.5-25 mcg blister with device 1 puff, inhalation, Daily         MR lumbar spine wo IV contrast 05/24/2023    Narrative  Interpreted By:  KATT OJEDA MD  MRN: [id number]  Patient Name: JEFFREY MCKNIGHT    STUDY:  MRI L-SPINE WO;  5/24/2023 2:48 pm    INDICATION:  back pain, DDD.    COMPARISON:  Radiograph 02/07/2023.    ACCESSION NUMBER(S):  [id number]    ORDERING CLINICIAN:  ERICA URBINA    TECHNIQUE:  Sagittal T1, T2, STIR, axial T1 and T2 weighted images of the lumbar  spine were acquired.    FINDINGS:  For counting purposes the last lumbarized vertebral body is labeled  L5.    There is S shaped scoliosis of the lumbar spine. There is  levoconvexity of the upper lumbar spine and dextro convexity of the  lower lumbar spine.    There is grade 1 anterolisthesis of L4 on L5. Alignment, vertebral  body heights and marrow signal pattern otherwise within normal  limits. Edema within the endplates at L4-L5 is likely degenerative.  There is desiccated disc signal throughout the lumbar spine with  moderate to severe disc height loss at L1-L2 and L4-L5. The conus  terminates at L1-L2 and is unremarkable.    Incompletely evaluated T2 hyperintense lesion within the liver may  represent a cyst. Evaluation of the paraspinal soft tissues is  otherwise unremarkable.    Evaluation by level:    T12-L1: No significant spinal canal or neural foraminal stenosis.    L1-L2: Right eccentric disc bulge and facet arthrosis. No significant  spinal canal stenosis. No significant left and moderate right neural  foraminal stenosis.    L2-L3: Disc bulge, facet arthrosis and ligamentum flavum thickening.  Mild spinal canal stenosis. Moderate right and mild-to-moderate left  neural foraminal stenosis.    L3-L4: Disc bulge, facet arthrosis and ligamentum flavum thickening.  Mild spinal canal stenosis. Moderate left and mild-to-moderate right  neural foraminal stenosis.    L4-L5: Disc bulge, facet arthrosis and ligamentum flavum  thickening.  Severe spinal canal stenosis. Severe left and no significant right  neural foraminal stenosis.    L5-S1: No significant spinal canal or neural foraminal stenosis.    Impression  S shaped scoliosis of the lumbar spine with multilevel degenerative  disc disease and facet arthrosis. There is severe spinal canal and  severe left neural foraminal stenosis at L4-L5.      MR cervical spine wo IV contrast 05/24/2023    Narrative  Interpreted By:  KATT OJEDA MD  MRN: [id number]  Patient Name: JEFFREY MCKNIGHT    STUDY:  MRI CERVICAL WO;  5/24/2023 2:43 pm    INDICATION:  severe c-spine degenerative disease on x-ray, neck pain.    COMPARISON:  Radiograph 02/07/2023.    ACCESSION NUMBER(S):  [id number]    ORDERING CLINICIAN:  ERICA URBINA    TECHNIQUE:  Sagittal T1, T2, STIR, axial T1 and axial T2 weighted images were  acquired through the cervical spine.    FINDINGS:  Craniocervical junction is within normal limits. Cerebellar tonsils  are above the foramen magnum. There is straightening of the normal  cervical lordosis. There is trace anterolisthesis of C3 on C4 and C4  on C5. Alignment, vertebral body heights and marrow signal pattern  are otherwise within normal limits. There is desiccated disc signal  throughout the cervical spine with moderate disc height loss  degenerative endplate changes at C5-C6 and C6-C7. Cervical cord is  unremarkable. Prevertebral soft tissues are not thickened.    Evaluation by level:    C1-C2: No significant spinal canal stenosis    C2-C3: Mild disc osteophyte complex, uncovertebral joint hypertrophy  and facet arthrosis. No significant spinal canal stenosis. Mild left  and no significant right neural foraminal stenosis    C3-C4: Mild left eccentric disc osteophyte complex, uncovertebral  joint hypertrophy and facet arthrosis. Mild spinal canal stenosis.  Moderate left and mild right neural foraminal stenosis    C4-C5: Disc osteophyte complex, uncovertebral joint hypertrophy  and  facet arthrosis. Moderate spinal canal stenosis. Moderate to severe  right and no significant left neural foraminal stenosis    C5-C6: Disc osteophyte complex, uncovertebral joint hypertrophy and  facet arthrosis. Moderate spinal canal and neural foraminal stenosis.    C6-C7: Disc osteophyte complex, uncovertebral joint hypertrophy and  facet arthrosis. Mild spinal canal stenosis. Moderate left and  mild-to-moderate right neural foraminal stenosis    C7-T1: No significant spinal canal or neural foraminal stenosis.    Impression  Multilevel degenerative disc disease and facet arthrosis with  moderate spinal canal stenosis at C4-C5, and C5-C6. There is moderate  to severe right neural foraminal stenosis at C4-C5 and moderate  neural foraminal stenosis at C3-C4 on the left, C5-C6 and C6-C7  bilaterally.     No image results found.       1. Lumbar pain  FL pain management    FL pain management    Transforaminal    Transforaminal           ASSESSMENT/PLAN  Dinora Elias is a 69 y.o. female who presents for Bilateral L4 transforaminal epidural steroid injection     Patient denies any recent antibiotic use or infections, denies any blood thinner use, and denies contrast or local anesthetic allergies     Risks, benefits, alternatives discussed. All questions answered to the best of my ability. Patient agrees to proceed.      Our plan is as follows:  - Proceed with aforementioned procedure          DO BALAJI Kerr Pain fellow

## 2024-05-01 NOTE — PROCEDURES
Preoperative diagnosis:  Lumbar radiculitis  Postoperative diagnosis:  Lumbar radiculitis  Procedure: Bilateral L4 lumbar transforaminal epidural steroid injection under fluoroscopic guidance  Surgeon: Cheyanne Vargas  Assistant:  Fellow, James  Anesthesia: Local plus sedation  Complications: Apparently none    Clinical note: Dinora is a 69-year-old female with a history of back and leg symptoms, known listhesis and degenerative changes worst at the L4-5 level.  She is here today for the aforementioned injection.    Procedure note: The patient was met in the preoperative holding area after risks benefits and alternatives to procedure were discussed with the patient, informed consent was obtained. Patient brought back to the procedure room and she was able to position herself into the prone position on the fluoroscopy table. Area over the back was exposed, prepped, draped, in the usual sterile fashion.  Skin and subcutaneous tissues to the neuroforamen was anesthetized using 0.5% lidocaine.  90 mm 22-gauge Sprotte needles were inserted in the skin and advanced into the foramen. Needle tip position was confirmed in AP oblique and lateral view.  Contrast was injected which showed appropriate epidural spread on the right.  Left-sided needle had to be slightly repositioned to get more medial spread.  In the final position there was appropriate epidural spread bilaterally and no intravascular or intrathecal uptake. A total of 2 mL of 0.5% lidocaine mixed with 10 mg dexamethasone was injected in divided doses among the 2 needles. Needles removed, bandage applied, patient tolerated the procedure well with no immediate complications.

## 2024-05-02 ENCOUNTER — TELEPHONE (OUTPATIENT)
Dept: HEMATOLOGY/ONCOLOGY | Facility: HOSPITAL | Age: 69
End: 2024-05-02

## 2024-05-02 ENCOUNTER — OFFICE VISIT (OUTPATIENT)
Dept: PRIMARY CARE | Facility: CLINIC | Age: 69
End: 2024-05-02
Payer: MEDICARE

## 2024-05-02 VITALS
BODY MASS INDEX: 19.48 KG/M2 | SYSTOLIC BLOOD PRESSURE: 110 MMHG | OXYGEN SATURATION: 96 % | HEIGHT: 61 IN | TEMPERATURE: 97.4 F | HEART RATE: 95 BPM | DIASTOLIC BLOOD PRESSURE: 64 MMHG | WEIGHT: 103.2 LBS

## 2024-05-02 DIAGNOSIS — M54.50 LUMBAR PAIN: ICD-10-CM

## 2024-05-02 DIAGNOSIS — J43.9 PULMONARY EMPHYSEMA, UNSPECIFIED EMPHYSEMA TYPE (MULTI): ICD-10-CM

## 2024-05-02 DIAGNOSIS — I10 ESSENTIAL HYPERTENSION: ICD-10-CM

## 2024-05-02 DIAGNOSIS — G89.29 CHRONIC THORACIC BACK PAIN, UNSPECIFIED BACK PAIN LATERALITY: ICD-10-CM

## 2024-05-02 DIAGNOSIS — Z13.31 DEPRESSION SCREENING: ICD-10-CM

## 2024-05-02 DIAGNOSIS — E46 PROTEIN-CALORIE MALNUTRITION, UNSPECIFIED SEVERITY (MULTI): ICD-10-CM

## 2024-05-02 DIAGNOSIS — Z71.89 ADVANCED DIRECTIVES, COUNSELING/DISCUSSION: ICD-10-CM

## 2024-05-02 DIAGNOSIS — M81.0 OSTEOPOROSIS, UNSPECIFIED OSTEOPOROSIS TYPE, UNSPECIFIED PATHOLOGICAL FRACTURE PRESENCE: ICD-10-CM

## 2024-05-02 DIAGNOSIS — Z00.00 ROUTINE GENERAL MEDICAL EXAMINATION AT HEALTH CARE FACILITY: Primary | ICD-10-CM

## 2024-05-02 DIAGNOSIS — I25.10 CORONARY ARTERY DISEASE INVOLVING NATIVE CORONARY ARTERY OF NATIVE HEART WITHOUT ANGINA PECTORIS: ICD-10-CM

## 2024-05-02 DIAGNOSIS — E78.2 COMBINED HYPERLIPIDEMIA: ICD-10-CM

## 2024-05-02 DIAGNOSIS — M54.6 CHRONIC THORACIC BACK PAIN, UNSPECIFIED BACK PAIN LATERALITY: ICD-10-CM

## 2024-05-02 PROBLEM — J32.9 SINUSITIS: Status: RESOLVED | Noted: 2023-03-06 | Resolved: 2024-05-02

## 2024-05-02 PROBLEM — F17.210 CIGARETTE NICOTINE DEPENDENCE: Status: RESOLVED | Noted: 2023-03-06 | Resolved: 2024-05-02

## 2024-05-02 PROCEDURE — 1158F ADVNC CARE PLAN TLK DOCD: CPT | Performed by: NURSE PRACTITIONER

## 2024-05-02 PROCEDURE — 1170F FXNL STATUS ASSESSED: CPT | Performed by: NURSE PRACTITIONER

## 2024-05-02 PROCEDURE — G0444 DEPRESSION SCREEN ANNUAL: HCPCS | Performed by: NURSE PRACTITIONER

## 2024-05-02 PROCEDURE — 3008F BODY MASS INDEX DOCD: CPT | Performed by: NURSE PRACTITIONER

## 2024-05-02 PROCEDURE — 3078F DIAST BP <80 MM HG: CPT | Performed by: NURSE PRACTITIONER

## 2024-05-02 PROCEDURE — 3074F SYST BP LT 130 MM HG: CPT | Performed by: NURSE PRACTITIONER

## 2024-05-02 PROCEDURE — 1160F RVW MEDS BY RX/DR IN RCRD: CPT | Performed by: NURSE PRACTITIONER

## 2024-05-02 PROCEDURE — 1123F ACP DISCUSS/DSCN MKR DOCD: CPT | Performed by: NURSE PRACTITIONER

## 2024-05-02 PROCEDURE — 99214 OFFICE O/P EST MOD 30 MIN: CPT | Performed by: NURSE PRACTITIONER

## 2024-05-02 PROCEDURE — 99397 PER PM REEVAL EST PAT 65+ YR: CPT | Performed by: NURSE PRACTITIONER

## 2024-05-02 PROCEDURE — G0439 PPPS, SUBSEQ VISIT: HCPCS | Performed by: NURSE PRACTITIONER

## 2024-05-02 PROCEDURE — 1159F MED LIST DOCD IN RCRD: CPT | Performed by: NURSE PRACTITIONER

## 2024-05-02 PROCEDURE — 1036F TOBACCO NON-USER: CPT | Performed by: NURSE PRACTITIONER

## 2024-05-02 RX ORDER — ALBUTEROL SULFATE 0.83 MG/ML
3 SOLUTION RESPIRATORY (INHALATION) AS NEEDED
Status: CANCELLED | OUTPATIENT
Start: 2024-05-02

## 2024-05-02 RX ORDER — DIPHENHYDRAMINE HYDROCHLORIDE 50 MG/ML
50 INJECTION INTRAMUSCULAR; INTRAVENOUS AS NEEDED
Status: CANCELLED | OUTPATIENT
Start: 2024-05-02

## 2024-05-02 RX ORDER — EPINEPHRINE 0.3 MG/.3ML
0.3 INJECTION SUBCUTANEOUS EVERY 5 MIN PRN
Status: CANCELLED | OUTPATIENT
Start: 2024-05-02

## 2024-05-02 RX ORDER — FAMOTIDINE 10 MG/ML
20 INJECTION INTRAVENOUS ONCE AS NEEDED
Status: CANCELLED | OUTPATIENT
Start: 2024-05-02

## 2024-05-02 ASSESSMENT — ENCOUNTER SYMPTOMS
LOSS OF SENSATION IN FEET: 0
DEPRESSION: 0
ARTHRALGIAS: 1
OCCASIONAL FEELINGS OF UNSTEADINESS: 1

## 2024-05-02 ASSESSMENT — ACTIVITIES OF DAILY LIVING (ADL)
DOING_HOUSEWORK: INDEPENDENT
DRESSING: INDEPENDENT
GROCERY_SHOPPING: INDEPENDENT
BATHING: INDEPENDENT
TAKING_MEDICATION: INDEPENDENT
MANAGING_FINANCES: INDEPENDENT

## 2024-05-02 ASSESSMENT — PATIENT HEALTH QUESTIONNAIRE - PHQ9
SUM OF ALL RESPONSES TO PHQ9 QUESTIONS 1 AND 2: 2
2. FEELING DOWN, DEPRESSED OR HOPELESS: SEVERAL DAYS
1. LITTLE INTEREST OR PLEASURE IN DOING THINGS: SEVERAL DAYS
10. IF YOU CHECKED OFF ANY PROBLEMS, HOW DIFFICULT HAVE THESE PROBLEMS MADE IT FOR YOU TO DO YOUR WORK, TAKE CARE OF THINGS AT HOME, OR GET ALONG WITH OTHER PEOPLE: SOMEWHAT DIFFICULT

## 2024-05-02 NOTE — TELEPHONE ENCOUNTER
Received Prolia order, ordered by PCP, ALEXANDRE Coulter. Patient's last dose 9/22/23. Patient overdue for injection. Was due on 3/20/24 or after. Labs needed prior to appointment. Lab orders are entered. Pre-cert pending review. Patient has [place] [employer] with Medicaid as secondary. Will email pre-cert team to obtain auth once patient is scheduled. Left detailed message, with call back number, for patient to call back at earliest convenience.

## 2024-05-03 NOTE — TELEPHONE ENCOUNTER
Patient returned phone call to schedule Prolia, ordered by PCP, ALEXANDRE Coulter. Patient's last dose 9/22/23. Patient overdue for injection. Informed patient of labs needed prior to appointment. Patient to get labs drawn early next week, either on Monday or Tuesday. Pre-cert pending review. Patient has [place] Medicare with Medicaid as secondary. Email sent to pre-cert team to obtain auth. Prolia scheduled for next Friday, 5/10/24 at 2 PM. Patient verbalized understanding and agreed to appointment.

## 2024-05-06 ENCOUNTER — LAB (OUTPATIENT)
Dept: LAB | Facility: LAB | Age: 69
End: 2024-05-06
Payer: MEDICARE

## 2024-05-06 DIAGNOSIS — E55.9 VITAMIN D DEFICIENCY: ICD-10-CM

## 2024-05-06 DIAGNOSIS — M81.0 OSTEOPOROSIS, UNSPECIFIED OSTEOPOROSIS TYPE, UNSPECIFIED PATHOLOGICAL FRACTURE PRESENCE: ICD-10-CM

## 2024-05-06 LAB
ALBUMIN SERPL BCP-MCNC: 4.4 G/DL (ref 3.4–5)
ALP SERPL-CCNC: 75 U/L (ref 33–136)
ALT SERPL W P-5'-P-CCNC: 20 U/L (ref 7–45)
ANION GAP SERPL CALC-SCNC: 16 MMOL/L (ref 10–20)
AST SERPL W P-5'-P-CCNC: 25 U/L (ref 9–39)
BILIRUB SERPL-MCNC: 0.5 MG/DL (ref 0–1.2)
BUN SERPL-MCNC: 10 MG/DL (ref 6–23)
CA-I BLD-SCNC: 1.25 MMOL/L (ref 1.1–1.33)
CALCIUM SERPL-MCNC: 10.2 MG/DL (ref 8.6–10.3)
CHLORIDE SERPL-SCNC: 99 MMOL/L (ref 98–107)
CO2 SERPL-SCNC: 28 MMOL/L (ref 21–32)
CREAT SERPL-MCNC: 0.58 MG/DL (ref 0.5–1.05)
EGFRCR SERPLBLD CKD-EPI 2021: >90 ML/MIN/1.73M*2
GLUCOSE SERPL-MCNC: 90 MG/DL (ref 74–99)
POTASSIUM SERPL-SCNC: 3.6 MMOL/L (ref 3.5–5.3)
PROT SERPL-MCNC: 7 G/DL (ref 6.4–8.2)
SODIUM SERPL-SCNC: 139 MMOL/L (ref 136–145)

## 2024-05-06 PROCEDURE — 36415 COLL VENOUS BLD VENIPUNCTURE: CPT

## 2024-05-06 PROCEDURE — 82330 ASSAY OF CALCIUM: CPT

## 2024-05-06 RX ORDER — CHOLECALCIFEROL (VITAMIN D3) 125 MCG
125 CAPSULE ORAL DAILY
Qty: 90 CAPSULE | Refills: 1 | Status: SHIPPED | OUTPATIENT
Start: 2024-05-06

## 2024-05-09 ENCOUNTER — APPOINTMENT (OUTPATIENT)
Dept: PRIMARY CARE | Facility: CLINIC | Age: 69
End: 2024-05-09
Payer: MEDICARE

## 2024-05-10 ENCOUNTER — INFUSION (OUTPATIENT)
Dept: HEMATOLOGY/ONCOLOGY | Facility: HOSPITAL | Age: 69
End: 2024-05-10
Payer: MEDICARE

## 2024-05-10 VITALS
HEIGHT: 61 IN | RESPIRATION RATE: 16 BRPM | OXYGEN SATURATION: 98 % | HEART RATE: 83 BPM | SYSTOLIC BLOOD PRESSURE: 122 MMHG | BODY MASS INDEX: 19.4 KG/M2 | TEMPERATURE: 97.2 F | DIASTOLIC BLOOD PRESSURE: 82 MMHG | WEIGHT: 102.73 LBS

## 2024-05-10 DIAGNOSIS — M81.0 OSTEOPOROSIS, UNSPECIFIED OSTEOPOROSIS TYPE, UNSPECIFIED PATHOLOGICAL FRACTURE PRESENCE: ICD-10-CM

## 2024-05-10 PROCEDURE — 2500000004 HC RX 250 GENERAL PHARMACY W/ HCPCS (ALT 636 FOR OP/ED): Mod: JZ,SE,TB | Performed by: NURSE PRACTITIONER

## 2024-05-10 PROCEDURE — 96372 THER/PROPH/DIAG INJ SC/IM: CPT

## 2024-05-10 RX ORDER — DIPHENHYDRAMINE HYDROCHLORIDE 50 MG/ML
50 INJECTION INTRAMUSCULAR; INTRAVENOUS AS NEEDED
OUTPATIENT
Start: 2024-11-02

## 2024-05-10 RX ORDER — EPINEPHRINE 0.3 MG/.3ML
0.3 INJECTION SUBCUTANEOUS EVERY 5 MIN PRN
Status: DISCONTINUED | OUTPATIENT
Start: 2024-05-10 | End: 2024-05-10 | Stop reason: HOSPADM

## 2024-05-10 RX ORDER — ALBUTEROL SULFATE 0.83 MG/ML
3 SOLUTION RESPIRATORY (INHALATION) AS NEEDED
Status: DISCONTINUED | OUTPATIENT
Start: 2024-05-10 | End: 2024-05-10 | Stop reason: HOSPADM

## 2024-05-10 RX ORDER — ALBUTEROL SULFATE 0.83 MG/ML
3 SOLUTION RESPIRATORY (INHALATION) AS NEEDED
OUTPATIENT
Start: 2024-11-02

## 2024-05-10 RX ORDER — FAMOTIDINE 10 MG/ML
20 INJECTION INTRAVENOUS ONCE AS NEEDED
Status: DISCONTINUED | OUTPATIENT
Start: 2024-05-10 | End: 2024-05-10 | Stop reason: HOSPADM

## 2024-05-10 RX ORDER — EPINEPHRINE 0.3 MG/.3ML
0.3 INJECTION SUBCUTANEOUS EVERY 5 MIN PRN
OUTPATIENT
Start: 2024-11-02

## 2024-05-10 RX ORDER — DIPHENHYDRAMINE HYDROCHLORIDE 50 MG/ML
50 INJECTION INTRAMUSCULAR; INTRAVENOUS AS NEEDED
Status: DISCONTINUED | OUTPATIENT
Start: 2024-05-10 | End: 2024-05-10 | Stop reason: HOSPADM

## 2024-05-10 RX ORDER — FAMOTIDINE 10 MG/ML
20 INJECTION INTRAVENOUS ONCE AS NEEDED
OUTPATIENT
Start: 2024-11-02

## 2024-05-10 RX ADMIN — DENOSUMAB 60 MG: 60 INJECTION SUBCUTANEOUS at 14:07

## 2024-05-10 ASSESSMENT — PAIN SCALES - GENERAL: PAINLEVEL: 6

## 2024-05-10 NOTE — PROGRESS NOTES
Pt in for her prolia and upon assessment she stated she just had dental work but thought it was a month ago.  I called her dentist and they stated her filling work was done in February and she only had a cleaning done on 4/24/24.  Pt advised to not have any major dental work for the next month and I also gave her the prolia card to give to her dentist before any further work is done.  Pt voiced understanding.

## 2024-05-17 DIAGNOSIS — M54.6 THORACIC BACK PAIN, UNSPECIFIED BACK PAIN LATERALITY, UNSPECIFIED CHRONICITY: ICD-10-CM

## 2024-05-22 DIAGNOSIS — J30.9 ALLERGIC RHINITIS, UNSPECIFIED SEASONALITY, UNSPECIFIED TRIGGER: ICD-10-CM

## 2024-05-22 DIAGNOSIS — I10 ESSENTIAL HYPERTENSION: ICD-10-CM

## 2024-05-22 RX ORDER — MONTELUKAST SODIUM 10 MG/1
10 TABLET ORAL NIGHTLY
Qty: 90 TABLET | Refills: 1 | Status: SHIPPED | OUTPATIENT
Start: 2024-05-22

## 2024-05-22 RX ORDER — LISINOPRIL 20 MG/1
20 TABLET ORAL DAILY
Qty: 90 TABLET | Refills: 1 | Status: SHIPPED | OUTPATIENT
Start: 2024-05-22

## 2024-06-03 DIAGNOSIS — E78.2 COMBINED HYPERLIPIDEMIA: ICD-10-CM

## 2024-06-03 RX ORDER — ATORVASTATIN CALCIUM 40 MG/1
40 TABLET, FILM COATED ORAL NIGHTLY
Qty: 90 TABLET | Refills: 1 | Status: SHIPPED | OUTPATIENT
Start: 2024-06-03

## 2024-06-10 ENCOUNTER — HOSPITAL ENCOUNTER (OUTPATIENT)
Dept: RADIOLOGY | Facility: HOSPITAL | Age: 69
Discharge: HOME | End: 2024-06-10
Payer: MEDICARE

## 2024-06-10 VITALS
OXYGEN SATURATION: 96 % | TEMPERATURE: 96.9 F | DIASTOLIC BLOOD PRESSURE: 68 MMHG | SYSTOLIC BLOOD PRESSURE: 118 MMHG | HEART RATE: 78 BPM | RESPIRATION RATE: 16 BRPM

## 2024-06-10 DIAGNOSIS — M54.6 THORACIC BACK PAIN, UNSPECIFIED BACK PAIN LATERALITY, UNSPECIFIED CHRONICITY: ICD-10-CM

## 2024-06-10 PROCEDURE — 2500000004 HC RX 250 GENERAL PHARMACY W/ HCPCS (ALT 636 FOR OP/ED): Performed by: ANESTHESIOLOGY

## 2024-06-10 PROCEDURE — 62321 NJX INTERLAMINAR CRV/THRC: CPT | Performed by: ANESTHESIOLOGY

## 2024-06-10 RX ORDER — MIDAZOLAM HYDROCHLORIDE 1 MG/ML
INJECTION, SOLUTION INTRAMUSCULAR; INTRAVENOUS
Status: COMPLETED | OUTPATIENT
Start: 2024-06-10 | End: 2024-06-10

## 2024-06-10 RX ADMIN — MIDAZOLAM HYDROCHLORIDE 2 MG: 1 INJECTION, SOLUTION INTRAMUSCULAR; INTRAVENOUS at 14:25

## 2024-06-10 ASSESSMENT — PAIN - FUNCTIONAL ASSESSMENT
PAIN_FUNCTIONAL_ASSESSMENT: 0-10
PAIN_FUNCTIONAL_ASSESSMENT: WONG-BAKER FACES
PAIN_FUNCTIONAL_ASSESSMENT: 0-10
PAIN_FUNCTIONAL_ASSESSMENT: 0-10
PAIN_FUNCTIONAL_ASSESSMENT: WONG-BAKER FACES

## 2024-06-10 ASSESSMENT — COLUMBIA-SUICIDE SEVERITY RATING SCALE - C-SSRS
2. HAVE YOU ACTUALLY HAD ANY THOUGHTS OF KILLING YOURSELF?: NO
6. HAVE YOU EVER DONE ANYTHING, STARTED TO DO ANYTHING, OR PREPARED TO DO ANYTHING TO END YOUR LIFE?: NO
1. IN THE PAST MONTH, HAVE YOU WISHED YOU WERE DEAD OR WISHED YOU COULD GO TO SLEEP AND NOT WAKE UP?: NO

## 2024-06-10 ASSESSMENT — PAIN SCALES - GENERAL
PAINLEVEL_OUTOF10: 7
PAINLEVEL_OUTOF10: 7
PAINLEVEL_OUTOF10: 0 - NO PAIN
PAINLEVEL_OUTOF10: 7
PAINLEVEL_OUTOF10: 0 - NO PAIN

## 2024-06-10 NOTE — H&P
History Of Present Illness  Dinora Elias is a 69 y.o. female presents for procedure state below. Endorses no changes in past medical history or medical health since last seen in clinic.     Past Medical History  She has a past medical history of Acute gingivitis, plaque induced (12/18/2014), Encounter for screening for malignant neoplasm of colon (11/03/2014), HTN (hypertension), Other specified disorders of teeth and supporting structures (01/04/2016), Otitis media, unspecified, right ear (01/25/2018), Personal history of other diseases of the circulatory system, Personal history of other diseases of the nervous system and sense organs (05/29/2015), Personal history of other diseases of the respiratory system, Personal history of other diseases of the respiratory system (06/10/2016), Personal history of other specified conditions (09/11/2014), and Unspecified otitis externa, unspecified ear (09/26/2018).    Surgical History  She has a past surgical history that includes Breast biopsy (10/01/2013) and Total vaginal hysterectomy (10/01/2013).     Social History  She reports that she quit smoking about 17 months ago. Her smoking use included cigarettes. She has never used smokeless tobacco. She reports current alcohol use of about 7.0 standard drinks of alcohol per week. She reports that she does not use drugs.    Family History  Family History   Problem Relation Name Age of Onset    Lung cancer Mother      Other (cerebromeningeal hemmorrhage) Father      Breast cancer Sister  64        Allergies  Patient has no known allergies.    Review of Symptoms:   Constitutional: Negative for chills, diaphoresis or fever  HENT: Negative for neck swelling  Eyes:.  Negative for eye pain  Respiratory:.  Negative for cough, shortness of breath or wheezing    Cardiovascular:.  Negative for chest pain or palpitations  Gastrointestinal:.  Negative for abdominal pain, nausea and vomiting  Genitourinary:.  Negative for  urgency  Musculoskeletal:  Positive for back pain. Positive for joint pain. Denies falls within the past 3 months.  Skin: Negative for wounds or itching   Neurological: Negative for dizziness, seizures, loss of consciousness and weakness  Endo/Heme/Allergies: Does not bruise/bleed easily  Psychiatric/Behavioral: Negative for depression. The patient does not appear anxious.       PHYSICAL EXAM  Vitals signs reviewed  Constitutional:       General: Not in acute distress     Appearance: Normal appearance. Not ill-appearing.  HENT:     Head: Normocephalic and atraumatic  Eyes:     Conjunctiva/sclera: Conjunctivae normal  Cardiovascular:     Rate and Rhythm: Normal rate and regular rhythm  Pulmonary:     Effort: No respiratory distress  Abdominal:     Palpations: Abdomen is soft  Musculoskeletal: PICKETT  Skin:     General: Skin is warm and dry  Neurological:     General: No focal deficit present  Psychiatric:         Mood and Affect: Mood normal         Behavior: Behavior normal     Last Recorded Vitals  There were no vitals taken for this visit.    Relevant Results  Current Outpatient Medications   Medication Instructions    amLODIPine (NORVASC) 2.5 mg, oral, Daily    atorvastatin (LIPITOR) 40 mg, oral, Nightly    cholecalciferol (VITAMIN D-3) 125 mcg, oral, Daily    fexofenadine (Allegra) 180 mg tablet 1 tablet, oral, Daily    gabapentin (NEURONTIN) 300 mg, oral, 3 times daily    hydroCHLOROthiazide (MICROZIDE) 12.5 mg, oral, Daily    ipratropium (Atrovent) 42 mcg (0.06 %) nasal spray nasal    lisinopril 20 mg, oral, Daily    montelukast (SINGULAIR) 10 mg, oral, Nightly    omeprazole (PRILOSEC) 40 mg, oral, Daily    tiZANidine (ZANAFLEX) 2 mg, oral, Every 8 hours PRN    Trelegy Ellipta 100-62.5-25 mcg blister with device 1 puff, inhalation, Daily         MR lumbar spine wo IV contrast 05/24/2023    Narrative  Interpreted By:  KATT OJEDA MD  MRN: [id number]  Patient Name: JEFFREY MCKNIGHT    STUDY:  MRI L-SPINE WO;   5/24/2023 2:48 pm    INDICATION:  back pain, DDD.    COMPARISON:  Radiograph 02/07/2023.    ACCESSION NUMBER(S):  [id number]    ORDERING CLINICIAN:  ERICA URBINA    TECHNIQUE:  Sagittal T1, T2, STIR, axial T1 and T2 weighted images of the lumbar  spine were acquired.    FINDINGS:  For counting purposes the last lumbarized vertebral body is labeled  L5.    There is S shaped scoliosis of the lumbar spine. There is  levoconvexity of the upper lumbar spine and dextro convexity of the  lower lumbar spine.    There is grade 1 anterolisthesis of L4 on L5. Alignment, vertebral  body heights and marrow signal pattern otherwise within normal  limits. Edema within the endplates at L4-L5 is likely degenerative.  There is desiccated disc signal throughout the lumbar spine with  moderate to severe disc height loss at L1-L2 and L4-L5. The conus  terminates at L1-L2 and is unremarkable.    Incompletely evaluated T2 hyperintense lesion within the liver may  represent a cyst. Evaluation of the paraspinal soft tissues is  otherwise unremarkable.    Evaluation by level:    T12-L1: No significant spinal canal or neural foraminal stenosis.    L1-L2: Right eccentric disc bulge and facet arthrosis. No significant  spinal canal stenosis. No significant left and moderate right neural  foraminal stenosis.    L2-L3: Disc bulge, facet arthrosis and ligamentum flavum thickening.  Mild spinal canal stenosis. Moderate right and mild-to-moderate left  neural foraminal stenosis.    L3-L4: Disc bulge, facet arthrosis and ligamentum flavum thickening.  Mild spinal canal stenosis. Moderate left and mild-to-moderate right  neural foraminal stenosis.    L4-L5: Disc bulge, facet arthrosis and ligamentum flavum thickening.  Severe spinal canal stenosis. Severe left and no significant right  neural foraminal stenosis.    L5-S1: No significant spinal canal or neural foraminal stenosis.    Impression  S shaped scoliosis of the lumbar spine with multilevel  degenerative  disc disease and facet arthrosis. There is severe spinal canal and  severe left neural foraminal stenosis at L4-L5.      MR cervical spine wo IV contrast 05/24/2023    Narrative  Interpreted By:  KATT OJEDA MD  MRN: [id number]  Patient Name: JEFFREY MCKNIGHT    STUDY:  MRI CERVICAL WO;  5/24/2023 2:43 pm    INDICATION:  severe c-spine degenerative disease on x-ray, neck pain.    COMPARISON:  Radiograph 02/07/2023.    ACCESSION NUMBER(S):  [id number]    ORDERING CLINICIAN:  ERICA URBINA    TECHNIQUE:  Sagittal T1, T2, STIR, axial T1 and axial T2 weighted images were  acquired through the cervical spine.    FINDINGS:  Craniocervical junction is within normal limits. Cerebellar tonsils  are above the foramen magnum. There is straightening of the normal  cervical lordosis. There is trace anterolisthesis of C3 on C4 and C4  on C5. Alignment, vertebral body heights and marrow signal pattern  are otherwise within normal limits. There is desiccated disc signal  throughout the cervical spine with moderate disc height loss  degenerative endplate changes at C5-C6 and C6-C7. Cervical cord is  unremarkable. Prevertebral soft tissues are not thickened.    Evaluation by level:    C1-C2: No significant spinal canal stenosis    C2-C3: Mild disc osteophyte complex, uncovertebral joint hypertrophy  and facet arthrosis. No significant spinal canal stenosis. Mild left  and no significant right neural foraminal stenosis    C3-C4: Mild left eccentric disc osteophyte complex, uncovertebral  joint hypertrophy and facet arthrosis. Mild spinal canal stenosis.  Moderate left and mild right neural foraminal stenosis    C4-C5: Disc osteophyte complex, uncovertebral joint hypertrophy and  facet arthrosis. Moderate spinal canal stenosis. Moderate to severe  right and no significant left neural foraminal stenosis    C5-C6: Disc osteophyte complex, uncovertebral joint hypertrophy and  facet arthrosis. Moderate spinal canal and  neural foraminal stenosis.    C6-C7: Disc osteophyte complex, uncovertebral joint hypertrophy and  facet arthrosis. Mild spinal canal stenosis. Moderate left and  mild-to-moderate right neural foraminal stenosis    C7-T1: No significant spinal canal or neural foraminal stenosis.    Impression  Multilevel degenerative disc disease and facet arthrosis with  moderate spinal canal stenosis at C4-C5, and C5-C6. There is moderate  to severe right neural foraminal stenosis at C4-C5 and moderate  neural foraminal stenosis at C3-C4 on the left, C5-C6 and C6-C7  bilaterally.     No image results found.       1. Thoracic back pain, unspecified back pain laterality, unspecified chronicity  Epidural Steroid Injection    Epidural Steroid Injection    FL pain management    FL pain management           ASSESSMENT/PLAN  Dinora Elias is a 69 y.o. female presents for thoracic epidural steroid injection- T8-9    Our plan is as follows:  - Follow In pain clinic  - Continue to participate in physical therapy as well as physician directed home exercises  - Continue pain medications as prescribed       Serjio Barraza MD

## 2024-06-10 NOTE — PROCEDURES
Preoperative/postoperative diagnosis: Thoracic spondylosis  Procedure: T8-9 thoracic epidural steroid injection under fluoroscopic guidance  Surgeon: Cheyanne Vargas  Assistant:  Fellow, Serjio Barraza  Anesthesia: Local  Complications: Apparently none    Clinical note: Dinora is a 69-year-old female with a history of back and leg symptoms as well as thoracic pain.  She has had intermittent injections for both areas and presents today to target the thoracic spine.    Procedure note: The patient was met in the preoperative holding area after risks benefits and alternatives to procedure were discussed with the patient, informed consent was obtained. Patient brought back to the procedure room and placed in the prone position on the fluoroscopy table. Area over the thoracic back was exposed, prepped, draped, in the usual sterile fashion.  Skin and subcutaneous tissues to the T8-9 intralaminar space was anesthetized using 0.5% lidocaine.  An 18-gauge Tuohy needle was inserted in the skin and advanced into the interlaminar space in a right paraspinous approach. A glass syringe was used to achieve the epidural space using the loss resistance technique. Contrast was injected which showed appropriate epidural spread, no intravascular or intrathecal uptake.  Contrast confirmed in the AP and contralateral oblique views.  A total of 2 mL's of 0.5% lidocaine mixed with 40 mg methylprednisolone was injected. Needle removed, bandage applied, patient tolerated the procedure well with no immediate complications.

## 2024-06-14 DIAGNOSIS — I10 ESSENTIAL HYPERTENSION: ICD-10-CM

## 2024-06-14 DIAGNOSIS — M54.2 CERVICALGIA: ICD-10-CM

## 2024-06-14 RX ORDER — AMLODIPINE BESYLATE 2.5 MG/1
2.5 TABLET ORAL DAILY
Qty: 90 TABLET | Refills: 1 | Status: SHIPPED | OUTPATIENT
Start: 2024-06-14

## 2024-06-14 RX ORDER — TIZANIDINE 2 MG/1
2 TABLET ORAL EVERY 8 HOURS PRN
Qty: 90 TABLET | Refills: 1 | Status: SHIPPED | OUTPATIENT
Start: 2024-06-14

## 2024-07-11 DIAGNOSIS — M54.16 LUMBAR RADICULOPATHY: ICD-10-CM

## 2024-07-29 ENCOUNTER — APPOINTMENT (OUTPATIENT)
Dept: RADIOLOGY | Facility: HOSPITAL | Age: 69
End: 2024-07-29
Payer: MEDICARE

## 2024-08-06 DIAGNOSIS — M54.50 LUMBAR PAIN: ICD-10-CM

## 2024-08-06 DIAGNOSIS — M54.2 CERVICALGIA: ICD-10-CM

## 2024-08-06 RX ORDER — TIZANIDINE 2 MG/1
2 TABLET ORAL EVERY 8 HOURS PRN
Qty: 90 TABLET | Refills: 1 | OUTPATIENT
Start: 2024-08-06

## 2024-08-07 RX ORDER — TRAMADOL HYDROCHLORIDE 50 MG/1
50 TABLET ORAL 3 TIMES DAILY PRN
Qty: 21 TABLET | Refills: 0 | Status: SHIPPED | OUTPATIENT
Start: 2024-08-07 | End: 2024-08-14

## 2024-08-26 ENCOUNTER — APPOINTMENT (OUTPATIENT)
Facility: HOSPITAL | Age: 69
End: 2024-08-26
Payer: MEDICARE

## 2024-08-26 VITALS
HEART RATE: 82 BPM | SYSTOLIC BLOOD PRESSURE: 94 MMHG | BODY MASS INDEX: 19.45 KG/M2 | DIASTOLIC BLOOD PRESSURE: 65 MMHG | WEIGHT: 103 LBS | HEIGHT: 61 IN | RESPIRATION RATE: 16 BRPM | OXYGEN SATURATION: 100 % | TEMPERATURE: 97.8 F

## 2024-08-26 DIAGNOSIS — M54.16 LUMBAR RADICULOPATHY: ICD-10-CM

## 2024-08-26 PROCEDURE — 2550000001 HC RX 255 CONTRASTS: Performed by: ANESTHESIOLOGY

## 2024-08-26 PROCEDURE — 2500000005 HC RX 250 GENERAL PHARMACY W/O HCPCS: Performed by: ANESTHESIOLOGY

## 2024-08-26 PROCEDURE — 64483 NJX AA&/STRD TFRM EPI L/S 1: CPT | Mod: 50 | Performed by: ANESTHESIOLOGY

## 2024-08-26 PROCEDURE — 2500000004 HC RX 250 GENERAL PHARMACY W/ HCPCS (ALT 636 FOR OP/ED): Performed by: ANESTHESIOLOGY

## 2024-08-26 PROCEDURE — 64483 NJX AA&/STRD TFRM EPI L/S 1: CPT | Performed by: ANESTHESIOLOGY

## 2024-08-26 RX ORDER — DEXAMETHASONE SODIUM PHOSPHATE 10 MG/ML
INJECTION INTRAMUSCULAR; INTRAVENOUS
Status: COMPLETED | OUTPATIENT
Start: 2024-08-26 | End: 2024-08-26

## 2024-08-26 RX ORDER — MIDAZOLAM HYDROCHLORIDE 1 MG/ML
INJECTION, SOLUTION INTRAMUSCULAR; INTRAVENOUS
Status: COMPLETED | OUTPATIENT
Start: 2024-08-26 | End: 2024-08-26

## 2024-08-26 RX ORDER — LIDOCAINE HYDROCHLORIDE 5 MG/ML
INJECTION, SOLUTION INFILTRATION; INTRAVENOUS
Status: COMPLETED | OUTPATIENT
Start: 2024-08-26 | End: 2024-08-26

## 2024-08-26 RX ORDER — MIDAZOLAM HYDROCHLORIDE 1 MG/ML
INJECTION INTRAMUSCULAR; INTRAVENOUS
Status: DISCONTINUED
Start: 2024-08-26 | End: 2024-08-27 | Stop reason: HOSPADM

## 2024-08-26 RX ORDER — LIDOCAINE HYDROCHLORIDE 5 MG/ML
INJECTION, SOLUTION INFILTRATION; INTRAVENOUS
Status: DISCONTINUED
Start: 2024-08-26 | End: 2024-08-27 | Stop reason: HOSPADM

## 2024-08-26 RX ORDER — DEXAMETHASONE SODIUM PHOSPHATE 10 MG/ML
INJECTION INTRAMUSCULAR; INTRAVENOUS
Status: DISCONTINUED
Start: 2024-08-26 | End: 2024-08-27 | Stop reason: HOSPADM

## 2024-08-26 ASSESSMENT — PAIN SCALES - GENERAL
PAINLEVEL_OUTOF10: 2
PAINLEVEL_OUTOF10: 8
PAINLEVEL_OUTOF10: 8
PAINLEVEL_OUTOF10: 2
PAINLEVEL_OUTOF10: 8

## 2024-08-26 ASSESSMENT — PAIN - FUNCTIONAL ASSESSMENT
PAIN_FUNCTIONAL_ASSESSMENT: 0-10

## 2024-08-26 NOTE — H&P
HISTORY AND PHYSICAL    History Of Present Illness  Dinora Elias is a 69 y.o. female presenting with chronic pain.  Here for Bilateral lumbar transforaminal epidural steroid injections    she denies any recent antibiotic use or infections, she denies any blood thinner use , and she denies contrast or local anesthetic allergies     Past Medical History  Past Medical History:   Diagnosis Date    Acute gingivitis, plaque induced 12/18/2014    Acute gingivitis    Encounter for screening for malignant neoplasm of colon 11/03/2014    Encounter for screening for malignant neoplasm of colon    HTN (hypertension)     Other specified disorders of teeth and supporting structures 01/04/2016    Tooth pain    Otitis media, unspecified, right ear 01/25/2018    Acute right otitis media    Personal history of other diseases of the circulatory system     History of hypertension    Personal history of other diseases of the nervous system and sense organs 05/29/2015    History of acute otitis media    Personal history of other diseases of the respiratory system     History of chronic obstructive lung disease    Personal history of other diseases of the respiratory system 06/10/2016    History of sinusitis    Personal history of other specified conditions 09/11/2014    History of abdominal pain    Unspecified otitis externa, unspecified ear 09/26/2018    Otitis externa       Surgical History  Past Surgical History:   Procedure Laterality Date    BREAST BIOPSY  10/01/2013    Biopsy Breast Open    TOTAL VAGINAL HYSTERECTOMY  10/01/2013    Vaginal Hysterectomy        Social History  She reports that she quit smoking about 19 months ago. Her smoking use included cigarettes. She has never used smokeless tobacco. She reports current alcohol use of about 7.0 standard drinks of alcohol per week. She reports that she does not use drugs.    Family History  Family History   Problem Relation Name Age of Onset    Lung cancer Mother      Other  "(cerebromeningeal hemmorrhage) Father      Breast cancer Sister  64        Allergies  Patient has no known allergies.    Review of Systems   12 point ROS done and negative except for the above.   Physical Exam     General: NAD, well groomed, well nourished  Eyes: Non-icteric sclera, EOMI  Ears, Nose, Mouth, and Throat: External ears and nose appear to be without deformity or rash. No lesions or masses noted. Hearing is grossly intact.   Neck: Trachea midline  Respiratory: Nonlabored breathing   Cardiovascular: No peripheral edema   Skin: No rashes or open lesions/ulcers identified on skin.    Last Recorded Vitals  There were no vitals taken for this visit.    Relevant Results  Current Outpatient Medications   Medication Instructions    amLODIPine (NORVASC) 2.5 mg, oral, Daily    atorvastatin (LIPITOR) 40 mg, oral, Nightly    cholecalciferol (VITAMIN D-3) 125 mcg, oral, Daily    fexofenadine (Allegra) 180 mg tablet 1 tablet, oral, Daily    gabapentin (NEURONTIN) 300 mg, oral, 3 times daily    hydroCHLOROthiazide (MICROZIDE) 12.5 mg, oral, Daily    ipratropium (Atrovent) 42 mcg (0.06 %) nasal spray nasal    lisinopril 20 mg, oral, Daily    montelukast (SINGULAIR) 10 mg, oral, Nightly    omeprazole (PRILOSEC) 40 mg, oral, Daily    tiZANidine (ZANAFLEX) 2 mg, oral, Every 8 hours PRN    Trelegy Ellipta 100-62.5-25 mcg blister with device 1 puff, inhalation, Daily      Lab Results   Component Value Date    WBC 9.3 12/12/2023    HGB 14.3 12/12/2023    HCT 43.1 12/12/2023     (H) 12/12/2023     12/12/2023      No results found for: \"INR\", \"PROTIME\"  No results found for: \"PTT\"  Lab Results   Component Value Date    GLUCOSE 90 05/06/2024    CALCIUM 10.2 05/06/2024     05/06/2024    K 3.6 05/06/2024    CO2 28 05/06/2024    CL 99 05/06/2024    BUN 10 05/06/2024    CREATININE 0.58 05/06/2024       === 10/09/23 ===    MR THORACIC SPINE WO CONTRAST    - Impression -  1. Degenerative changes of the thoracic " spine without spinal canal  stenosis or neural foraminal narrowing at any level.    2. Stable edematous signal abnormality located at the junction  between the right 12th rib and the T12 vertebral body and the left  11th rib and T11 vertebral body, probably sequela of degenerative  changes, correlate clinically.    This study was interpreted at [facility].      MACRO:  None    Signed by: Zelalem Alonzo 10/10/2023 6:41 AM  Dictation workstation:   [id number]       Assessment/Plan   Dinora Elias is a 69 y.o. F who presents for bilateral Lumbar transforaminal epidural steroid injection.     Risks, benefits, alternatives discussed. All questions answered to the best of my ability. Patient agrees to proceed. Consent signed and patient marked appropriately.    -We will proceed with planned procedure        Maxwell Cardozo MD  Interventional Pain Fellow, PGY-5  [facility]

## 2024-08-26 NOTE — DISCHARGE INSTRUCTIONS
DISCHARGE INSTRUCTIONS FOR INJECTIONS     You underwent Lumbar transforaminal epidural steroid injection today    After most injections, it is recommended that you relax and limit your activity for the remainder of the day unless you have been told otherwise by your pain physician.  You should not drive a car, operate machinery, or make important legal decisions unless otherwise directed by your pain physician.  You may resume your normal activity, including exercise, tomorrow.      Keep a written pain diary of how much pain relief you experienced following the injection procedure and the length of time of pain relief you experienced pain relief. Following diagnostic injections like medial branch nerve blocks, sacroiliac joint blocks, stellate ganglion injections and other blocks, it is very important you record the specific amount of pain relief you experienced immediately after the injectionand how long it lasted. Your doctor will ask you for this information at your follow up visit.     For all injections, please keep the injection site dry and inspect the site for a couple of days. You may remove the Band-Aid the day of the injection at any time.     Some discomfort, bruising or slight swelling may occur at the injection site. This is not abnormal if it occurs.  If needed you may:    -Take over the counter medication such as Tylenol or Motrin.   -Apply an ice pack for 30 minutes, 2 to 3 times a day for the first 24 hours.     You may shower today; no soaking baths, hot tubs, whirlpools or swimming pools for two days.      If you are given steroids in your injection, it may take 3-5 days for the steroid medication to take effect. You may notice a worsening of your symptoms for 1-2 days after the injection. This is not abnormal.  You may use acetaminophen, ibuprofen, or prescription medication that your doctor may have prescribed for you if you need to do so.     A few common side effects of steroids include  facial flushing, sweating, restlessness, irritability,difficulty sleeping, increase in blood sugar, and increased blood pressure. If you have diabetes, please monitor your blood sugar at least once a day for at least 5 days. If you have poorly controlled high blood pressure, monitoryour blood pressure for at least 2 days and contact your primary care physician if these numbers are unusually high for you.      If you take aspirin or non-steroidal anti-inflammatory drugs (examples are Motrin, Advil, ibuprofen, Naprosyn, Voltaren, Relafen, etc.) you may restart these this evening, but stop taking it 3 days before your next appointment, unless instructed otherwiseby your physician.      You do not need to discontinue non-aspirin-containing pain medications prior to an injection (examples: Celebrex, tramadol, hydrocodone and acetaminophen).      If you take a blood thinning medication (Coumadin, Lovenox, Fragmin,Ticlid, Plavix, Pradaxa, etc.), please discuss this with your primary care physician/cardiologist and your pain physician. These medications MUST be discontinued before you can have an injection safely, without the risk of uncontrolled bleeding. If these medications are not discontinued for an appropriate period of time, you will not be able to receivean injection. Please adhere to instructions given to you about when to restart your blood thinning medication. If you have any questions please reach out to our team.    If you are taking Coumadin, please have your INR checked the morning of your procedure and bring the result to your appointment unless otherwise instructed. If your INR is over 1.2, your injection may need to be rescheduled to avoid uncontrolled bleeding from the needle placement.     Call [facility]  and ask for Pain Management at [phone] between 8am-4pm Monday - Friday if you are experiencing the following:    If you received an epidural or spinal injection:    -Headache that doesnot go away  with medicine, is worse when sitting or standing up, and is greatly relieved upon lying down.   -Severe pain worse than or different than your baseline pain.   -Chills or fever (101º F or greater).   -Drainage or signs of infection at the injection site     Go directly to the Emergency Department if you are experiencing the following and received an epidural or spinal injection:   -Abrupt weakness or progressive weakness in your legs that starts after you leave the clinic.   -Abrupt severe or worsening numbness in your legs.   -Inability to urinate after the injection or loss of bowel or bladder control without the urge to defecate or urinate.     If you have a clinical question that cannot wait until your next appointment, please call [phone] between 8am-4pm Monday - Friday or send a eMazeMe message. We do our best to return all non-emergency messages within 24 hours, Monday - Friday. A nurse or physician will return your message. You may also try calling Dr. Sam Del Valle's nurse ([phone]) and they will do their best to answer your question(s).    If you need to cancel an appointment, please call the scheduling staff at [phone] during normal business hours or leave a message at least 24 hours in advance.     If you are going to be sedated for your next procedure, you MUST have responsible adult who can legally drive accompany you home. You cannot eat or drink for at least eight hours prior to the planned procedure if you are going to receive sedation. You may take your non-blood thinning medications with a small sip of water.

## 2024-09-12 DIAGNOSIS — M54.2 CERVICALGIA: ICD-10-CM

## 2024-09-13 RX ORDER — TIZANIDINE 2 MG/1
2 TABLET ORAL EVERY 8 HOURS PRN
Qty: 90 TABLET | Refills: 0 | Status: SHIPPED | OUTPATIENT
Start: 2024-09-13

## 2024-10-01 ENCOUNTER — OFFICE VISIT (OUTPATIENT)
Dept: PAIN MEDICINE | Facility: HOSPITAL | Age: 69
End: 2024-10-01
Payer: MEDICARE

## 2024-10-01 DIAGNOSIS — M54.50 LUMBAR PAIN: ICD-10-CM

## 2024-10-01 DIAGNOSIS — M54.6 THORACIC BACK PAIN, UNSPECIFIED BACK PAIN LATERALITY, UNSPECIFIED CHRONICITY: ICD-10-CM

## 2024-10-01 PROCEDURE — 1159F MED LIST DOCD IN RCRD: CPT | Performed by: ANESTHESIOLOGY

## 2024-10-01 PROCEDURE — 1125F AMNT PAIN NOTED PAIN PRSNT: CPT | Performed by: ANESTHESIOLOGY

## 2024-10-01 PROCEDURE — 99214 OFFICE O/P EST MOD 30 MIN: CPT | Performed by: ANESTHESIOLOGY

## 2024-10-01 ASSESSMENT — PAIN SCALES - GENERAL: PAINLEVEL: 7

## 2024-10-01 NOTE — PROGRESS NOTES
Chief Complaint   Patient presents with    Back Pain     Subjective   Patient ID: Dinora Elias is a 69 y.o. female with a past medical history of chronic pain secondary to lumbar spinal stenosis and thoracolumbar scoliosis.       HPI:   Ms. Dinora Elias is a 69F presenting with chronic pain secondary to lumbar spinal stenosis with neurogenic claudication and thoracolumbar scoliosis. Patient had a right-favored T8-T9 interlaminar epidural steroid injection on 6/10/24 with >50% relief of symptoms for about 3 months and a bilateral L4 transforaminal epidural steroid injection on 8/26/24 with 80% relief of symptoms for the first few weeks and now about 50% relief. Patient is accompanied by her daughter. Both express frustration that she is unable to get both thoracic and lumbar relief at the same time. She reports that the pain is unchanged. It is a midline aching pain without radiation. The pain is constant 3/10 and can get severe 10/10 with prolonged standing or prolonged sitting. She feels the pain has been getting progressively worse over the last 6-8 months. The pain is increasingly interfering with her ADLs and limiting her ability to be active outside her house. Expresses interest in exploring additional adjunct therapies, including massage therapy, acupuncture, chiropractic therapy.     Physical Therapy: The patient has not done physical therapy within the past six months  Other Conservative Measures she has tried: Heating Pad, Ice, and Injections  Classes of medications tried in the past: Acetaminophen, NSAIDs, and Gabapentenoids      Review of Systems   13-point ROS done and negative except for HPI.     Current Outpatient Medications   Medication Instructions    amLODIPine (NORVASC) 2.5 mg, oral, Daily    atorvastatin (LIPITOR) 40 mg, oral, Nightly    cholecalciferol (VITAMIN D-3) 125 mcg, oral, Daily    fexofenadine (Allegra) 180 mg tablet 1 tablet, oral, Daily    gabapentin (NEURONTIN) 300 mg,  oral, 3 times daily    hydroCHLOROthiazide (MICROZIDE) 12.5 mg, oral, Daily    ipratropium (Atrovent) 42 mcg (0.06 %) nasal spray nasal    lisinopril 20 mg, oral, Daily    montelukast (SINGULAIR) 10 mg, oral, Nightly    omeprazole (PRILOSEC) 40 mg, oral, Daily    tiZANidine (ZANAFLEX) 2 mg, oral, Every 8 hours PRN    Trelegy Ellipta 100-62.5-25 mcg blister with device 1 puff, inhalation, Daily       Past Medical History:   Diagnosis Date    Acute gingivitis, plaque induced 12/18/2014    Acute gingivitis    Encounter for screening for malignant neoplasm of colon 11/03/2014    Encounter for screening for malignant neoplasm of colon    HTN (hypertension)     Other specified disorders of teeth and supporting structures 01/04/2016    Tooth pain    Otitis media, unspecified, right ear 01/25/2018    Acute right otitis media    Personal history of other diseases of the circulatory system     History of hypertension    Personal history of other diseases of the nervous system and sense organs 05/29/2015    History of acute otitis media    Personal history of other diseases of the respiratory system     History of chronic obstructive lung disease    Personal history of other diseases of the respiratory system 06/10/2016    History of sinusitis    Personal history of other specified conditions 09/11/2014    History of abdominal pain    Unspecified otitis externa, unspecified ear 09/26/2018    Otitis externa        Past Surgical History:   Procedure Laterality Date    BREAST BIOPSY  10/01/2013    Biopsy Breast Open    TOTAL VAGINAL HYSTERECTOMY  10/01/2013    Vaginal Hysterectomy        Family History   Problem Relation Name Age of Onset    Lung cancer Mother      Other (cerebromeningeal hemmorrhage) Father      Breast cancer Sister  64        No Known Allergies     Objective     There were no vitals filed for this visit.     Physical Exam  General: NAD, well groomed, well nourished  Eyes: Non-icteric sclera, EOMI  Ears, Nose,  Mouth, and Throat: External ears and nose appear to be without deformity or rash. No lesions or masses noted. Hearing is grossly intact.   Neck: Trachea midline  Respiratory: Nonlabored breathing   Cardiovascular: no peripheral edema   Skin: No rashes or open lesions/ulcers identified on skin.    Back:   Palpation: No tenderness to palpation over lumbar paraspinous muscles.   Straight leg raise: does not reproduce their pain, bilaterally   GOKUL Maneuver does not reproduce pain bilaterally    Hip: No pain over greater trochanters. and Pain not reproduced with hip internal/external rotation.     Neurologic:   Cranial nerves grossly intact.   Strength: 5/5 and symmetric plantar/dorsiflexion   Sensation: Normal to light touch throughout, pinprick intact throughout.  DTRs:normal and symmetric throughout  Hurtado: absent  Clonus: absent    Psychiatric: Alert, orientation to person, place, and time. Cooperative.    Imaging personally reviewed and independently interpreted: MRI thoracic and lumbar spine from 2023    Assessment/Plan   Ms. Dinora Elias is a 69F presenting with chronic pain secondary to lumbar spinal stenosis with neurogenic claudication and thoracolumbar scoliosis. Discussed referral to [facility] for adjunct therapy, including massage therapy, acupuncture, chiropractic therapy. Discussed following up with Dr. Joyner to discuss surgical options as she was last seen over a year ago. Consider additional non-surgical approaches if she is not a candidate for surgery, intermediate procedures.    Plan:  - Referral to [facility] for adjunct therapy.  Patient specifically interested in acupuncture and massage therapy.  - Follow-up with Dr. Joyner in regards to her spinal stenosis at L4-5 discussed the negative effects of smoking and necessity to discontinue vaping.  She does get relief with interventional pain procedures but they are short lasting.    The patient has failed  treatment with : have significant limitations in their sleep quality due to the pain, have significant limitations of their quality of life due to the pain, and have significant impairments of their activities of daily living (ADLs) due to the pain    We discussed  the risks, benefits and alternatives of the procedure including but not limited to: , Lack of efficacy , Transiently worsening pain , Bleeding, Infection , and Nerve Damage    Follow up: As needed     The patient was invited to contact us back anytime with any questions or concerns and follow-up with us in the office as needed.     There are no diagnoses linked to this encounter.    This note was generated with the aid of dictation software, there may be typos despite my attempts at proofreading.

## 2024-10-07 DIAGNOSIS — E55.9 VITAMIN D DEFICIENCY: ICD-10-CM

## 2024-10-07 RX ORDER — CHOLECALCIFEROL (VITAMIN D3) 125 MCG
125 CAPSULE ORAL DAILY
Qty: 90 CAPSULE | Refills: 0 | Status: SHIPPED | OUTPATIENT
Start: 2024-10-07

## 2024-10-14 ENCOUNTER — APPOINTMENT (OUTPATIENT)
Dept: PRIMARY CARE | Facility: CLINIC | Age: 69
End: 2024-10-14
Payer: MEDICARE

## 2024-10-14 VITALS
SYSTOLIC BLOOD PRESSURE: 128 MMHG | WEIGHT: 97.8 LBS | HEART RATE: 97 BPM | HEIGHT: 60 IN | DIASTOLIC BLOOD PRESSURE: 88 MMHG | BODY MASS INDEX: 19.2 KG/M2 | OXYGEN SATURATION: 95 % | TEMPERATURE: 97.8 F

## 2024-10-14 DIAGNOSIS — Z87.891 PERSONAL HISTORY OF SMOKING: ICD-10-CM

## 2024-10-14 DIAGNOSIS — I25.10 CORONARY ARTERY DISEASE INVOLVING NATIVE CORONARY ARTERY OF NATIVE HEART WITHOUT ANGINA PECTORIS: ICD-10-CM

## 2024-10-14 DIAGNOSIS — Z12.31 SCREENING MAMMOGRAM, ENCOUNTER FOR: Primary | ICD-10-CM

## 2024-10-14 DIAGNOSIS — M54.6 CHRONIC BILATERAL THORACIC BACK PAIN: ICD-10-CM

## 2024-10-14 DIAGNOSIS — I10 ESSENTIAL HYPERTENSION: ICD-10-CM

## 2024-10-14 DIAGNOSIS — G89.29 CHRONIC BILATERAL THORACIC BACK PAIN: ICD-10-CM

## 2024-10-14 DIAGNOSIS — J30.1 SEASONAL ALLERGIC RHINITIS DUE TO POLLEN: ICD-10-CM

## 2024-10-14 DIAGNOSIS — J43.9 PULMONARY EMPHYSEMA, UNSPECIFIED EMPHYSEMA TYPE (MULTI): ICD-10-CM

## 2024-10-14 PROCEDURE — 3074F SYST BP LT 130 MM HG: CPT

## 2024-10-14 PROCEDURE — 99204 OFFICE O/P NEW MOD 45 MIN: CPT

## 2024-10-14 PROCEDURE — 3079F DIAST BP 80-89 MM HG: CPT

## 2024-10-14 PROCEDURE — 1036F TOBACCO NON-USER: CPT

## 2024-10-14 PROCEDURE — 3008F BODY MASS INDEX DOCD: CPT

## 2024-10-14 PROCEDURE — 1159F MED LIST DOCD IN RCRD: CPT

## 2024-10-14 ASSESSMENT — ENCOUNTER SYMPTOMS
ENDOCRINE NEGATIVE: 1
HEADACHES: 0
OCCASIONAL FEELINGS OF UNSTEADINESS: 0
ABDOMINAL PAIN: 0
SHORTNESS OF BREATH: 0
RESPIRATORY NEGATIVE: 1
DIZZINESS: 0
WEAKNESS: 0
ALLERGIC/IMMUNOLOGIC NEGATIVE: 1
DEPRESSION: 0
FEVER: 0
PSYCHIATRIC NEGATIVE: 1
UNEXPECTED WEIGHT CHANGE: 0
FATIGUE: 0
GASTROINTESTINAL NEGATIVE: 1
MUSCULOSKELETAL NEGATIVE: 1
LOSS OF SENSATION IN FEET: 0
CARDIOVASCULAR NEGATIVE: 1
ACTIVITY CHANGE: 0

## 2024-10-14 ASSESSMENT — PATIENT HEALTH QUESTIONNAIRE - PHQ9
SUM OF ALL RESPONSES TO PHQ9 QUESTIONS 1 AND 2: 0
2. FEELING DOWN, DEPRESSED OR HOPELESS: NOT AT ALL
1. LITTLE INTEREST OR PLEASURE IN DOING THINGS: NOT AT ALL

## 2024-10-14 NOTE — PATIENT INSTRUCTIONS
PREVNAR 20 Pneumonia Booster    Mammogram ordered  See me in 7 months medicare wellness in May  Call for refills    Thank you for coming in today, if any questions or concerns arise, please call my office.   Jamie Mcguire, APRN-CNP

## 2024-10-14 NOTE — PROGRESS NOTES
Subjective   Patient ID: Dinora Elias is a 69 y.o. female who presents for New Patient Visit (Establishing. Will need refills soon. No concerns today.).  Dinora presents today to establish care, previous provider is leaving the state  No acute complaints    Due for mammogram    She does not smoke cigarettes but does admit to vape use.    Problem List Items Addressed This Visit     Allergic rhinitis    Essential hypertension    CAD (coronary artery disease)    Thoracic back pain    Pulmonary emphysema, unspecified emphysema type (Multi)    Personal history of smoking   Other Visit Diagnoses     Screening mammogram, encounter for    -  Primary    Relevant Orders    BI mammo bilateral screening tomosynthesis           Vitals:    10/14/24 1417   BP: 128/88   Pulse: 97   Temp: 36.6 °C (97.8 °F)   SpO2: 95%       Review of Systems   Constitutional:  Negative for activity change, fatigue, fever and unexpected weight change.   HENT: Negative.     Respiratory: Negative.  Negative for shortness of breath.    Cardiovascular: Negative.  Negative for chest pain.   Gastrointestinal: Negative.  Negative for abdominal pain.   Endocrine: Negative.    Musculoskeletal: Negative.    Skin: Negative.    Allergic/Immunologic: Negative.    Neurological:  Negative for dizziness, weakness and headaches.   Psychiatric/Behavioral: Negative.         Objective   Physical Exam  Vitals and nursing note reviewed.   Constitutional:       Appearance: Normal appearance.   HENT:      Head: Normocephalic.      Mouth/Throat:      Mouth: Mucous membranes are moist.   Cardiovascular:      Rate and Rhythm: Normal rate and regular rhythm.      Pulses: Normal pulses.      Heart sounds: Normal heart sounds. No murmur heard.     No friction rub. No gallop.   Pulmonary:      Effort: Pulmonary effort is normal. No respiratory distress.      Breath sounds: Normal breath sounds. No wheezing.   Abdominal:      General: Bowel sounds are normal. There is no  distension.      Palpations: Abdomen is soft.      Tenderness: There is no abdominal tenderness.   Musculoskeletal:         General: No deformity. Normal range of motion.   Skin:     General: Skin is warm and dry.      Capillary Refill: Capillary refill takes less than 2 seconds.   Neurological:      General: No focal deficit present.      Mental Status: She is alert and oriented to person, place, and time.   Psychiatric:         Mood and Affect: Mood normal.       Assessment/Plan   Problem List Items Addressed This Visit       Allergic rhinitis    Essential hypertension    CAD (coronary artery disease)    Thoracic back pain    Pulmonary emphysema, unspecified emphysema type (Multi)    Personal history of smoking     Other Visit Diagnoses       Screening mammogram, encounter for    -  Primary    Relevant Orders    BI mammo bilateral screening tomosynthesis                 Thank you for coming in today, please call my office if you have any concerns or questions.     Jamie VITAL, CNP

## 2024-10-21 DIAGNOSIS — M54.2 CERVICALGIA: ICD-10-CM

## 2024-10-21 RX ORDER — TIZANIDINE 2 MG/1
2 TABLET ORAL EVERY 8 HOURS PRN
Qty: 90 TABLET | Refills: 0 | Status: SHIPPED | OUTPATIENT
Start: 2024-10-21

## 2024-10-29 NOTE — PROGRESS NOTES
"Dinora Elias is a very nice 69 year old woman whom I last saw on 8/22/23. Please see copied and pasted note below.     She was referred to Pain management with Dr. Cheyanne Vargas.  She had KARRIE, which helped only temporarily. She continues to take gabapentin and tizanidine with minimal help. She is accompanied by her friend and they both attest to how much less she is walking over the last year because of her pain. Her pain is in her back and gets worse with walking and standing. She has a positive shopping cart sign. She really does not have significant leg pain. She has full strength in her legs on exam.     I explained again that she is a poor surgical candidate especially for any fusion surgery, but we may be able to do a minimally invasive L4-5 lumbar laminectomy and get rid of the central spinal canal stenosis at this level which could help with what sounds like most \"back claudication\" (neurogenic claudication due to lumbar spinal stenosis).  I explained the surgery and risks of the surgery including weakness, numbness, csf leak, infection, and spinal instability. I would like get new MRI L spine since the previous MRI is now well over 1.5 years old. I would also like to get flexion-extension upright xrays of L spine to make sure there is no instability at the L4-5 level before proceeding with surgery.     8/22/2023 note:   Ms. DINORA ELIAS is a very nice 68 year old woman Hx osteoporosis and active smoker who presents with one year of lower back pain that radiates to left hip, groin and anterior thigh, gradually has worsened especially over last 6 months. Has done Physical therapy, which helped only midly. Denies weakness, gait abnormalities, bowel or bladder issues.     I personally reviewed x-rays of the lumbar spine done on 2/7/2023 and MRI of the lumbar spine done on 5/24/2023 which shows severe thoracolumbar levoscoliosis with up to 30 degree curve on coronal sequences. There is grade 2 " spondylolisthesis at the L4-5 level with severe canal and left foraminal stenosis. She is a very poor surgical candidate for spine fusion surgery because of her osteoporosis and smoking and there is no decompression only surgery that I think will help her. I referred her to Dr. Cheyanne Vargas in pain management.    Luis Joyner MD    Prep Time On Date of the Patient Encounter:    10 Minutes  Time Directly with Patient/Family/Caregiver:    15 Minutes  Additional Time Spent on Patient Care Activities:   0 Minutes  Documentation Time:       5 Minutes  Other Time Spent:       0 Minutes    Details of Other Time Spent:      Total Time on Date of Patient Encounter:    30 Minutes

## 2024-10-31 ENCOUNTER — LAB (OUTPATIENT)
Dept: LAB | Facility: LAB | Age: 69
End: 2024-10-31
Payer: MEDICARE

## 2024-10-31 DIAGNOSIS — M81.0 OSTEOPOROSIS, UNSPECIFIED OSTEOPOROSIS TYPE, UNSPECIFIED PATHOLOGICAL FRACTURE PRESENCE: ICD-10-CM

## 2024-10-31 LAB
ALBUMIN SERPL BCP-MCNC: 4.6 G/DL (ref 3.4–5)
ALP SERPL-CCNC: 59 U/L (ref 33–136)
ALT SERPL W P-5'-P-CCNC: 35 U/L (ref 7–45)
ANION GAP SERPL CALC-SCNC: 16 MMOL/L (ref 10–20)
AST SERPL W P-5'-P-CCNC: 46 U/L (ref 9–39)
BILIRUB SERPL-MCNC: 0.8 MG/DL (ref 0–1.2)
BUN SERPL-MCNC: 11 MG/DL (ref 6–23)
CALCIUM SERPL-MCNC: 9.4 MG/DL (ref 8.6–10.3)
CHLORIDE SERPL-SCNC: 94 MMOL/L (ref 98–107)
CO2 SERPL-SCNC: 27 MMOL/L (ref 21–32)
CREAT SERPL-MCNC: 0.64 MG/DL (ref 0.5–1.05)
EGFRCR SERPLBLD CKD-EPI 2021: >90 ML/MIN/1.73M*2
GLUCOSE SERPL-MCNC: 97 MG/DL (ref 74–99)
POTASSIUM SERPL-SCNC: 4.4 MMOL/L (ref 3.5–5.3)
PROT SERPL-MCNC: 6.8 G/DL (ref 6.4–8.2)
SODIUM SERPL-SCNC: 133 MMOL/L (ref 136–145)

## 2024-10-31 PROCEDURE — 36415 COLL VENOUS BLD VENIPUNCTURE: CPT

## 2024-10-31 PROCEDURE — 82330 ASSAY OF CALCIUM: CPT

## 2024-11-01 LAB — CA-I BLD-SCNC: 1.13 MMOL/L (ref 1.1–1.33)

## 2024-11-05 ENCOUNTER — OFFICE VISIT (OUTPATIENT)
Dept: NEUROSURGERY | Facility: CLINIC | Age: 69
End: 2024-11-05
Payer: MEDICARE

## 2024-11-05 ENCOUNTER — APPOINTMENT (OUTPATIENT)
Dept: PRIMARY CARE | Facility: CLINIC | Age: 69
End: 2024-11-05
Payer: MEDICARE

## 2024-11-05 VITALS
DIASTOLIC BLOOD PRESSURE: 73 MMHG | HEART RATE: 91 BPM | BODY MASS INDEX: 18.88 KG/M2 | HEIGHT: 61 IN | WEIGHT: 100 LBS | TEMPERATURE: 98 F | SYSTOLIC BLOOD PRESSURE: 107 MMHG

## 2024-11-05 DIAGNOSIS — M48.062 LUMBAR STENOSIS WITH NEUROGENIC CLAUDICATION: ICD-10-CM

## 2024-11-05 DIAGNOSIS — M43.17 SPONDYLOLISTHESIS OF LUMBOSACRAL REGION: Primary | ICD-10-CM

## 2024-11-05 PROCEDURE — 99214 OFFICE O/P EST MOD 30 MIN: CPT | Performed by: NEUROLOGICAL SURGERY

## 2024-11-05 PROCEDURE — 3008F BODY MASS INDEX DOCD: CPT | Performed by: NEUROLOGICAL SURGERY

## 2024-11-05 PROCEDURE — 1159F MED LIST DOCD IN RCRD: CPT | Performed by: NEUROLOGICAL SURGERY

## 2024-11-05 PROCEDURE — 3074F SYST BP LT 130 MM HG: CPT | Performed by: NEUROLOGICAL SURGERY

## 2024-11-05 PROCEDURE — 3078F DIAST BP <80 MM HG: CPT | Performed by: NEUROLOGICAL SURGERY

## 2024-11-05 PROCEDURE — 1125F AMNT PAIN NOTED PAIN PRSNT: CPT | Performed by: NEUROLOGICAL SURGERY

## 2024-11-05 ASSESSMENT — PAIN SCALES - GENERAL: PAINLEVEL_OUTOF10: 7

## 2024-11-06 ENCOUNTER — INFUSION (OUTPATIENT)
Dept: HEMATOLOGY/ONCOLOGY | Facility: HOSPITAL | Age: 69
End: 2024-11-06
Payer: MEDICARE

## 2024-11-06 VITALS
TEMPERATURE: 97.3 F | RESPIRATION RATE: 16 BRPM | HEART RATE: 78 BPM | WEIGHT: 101.19 LBS | SYSTOLIC BLOOD PRESSURE: 122 MMHG | OXYGEN SATURATION: 95 % | DIASTOLIC BLOOD PRESSURE: 76 MMHG | BODY MASS INDEX: 19.11 KG/M2 | HEIGHT: 61 IN

## 2024-11-06 DIAGNOSIS — M85.80 LOW BONE MASS: ICD-10-CM

## 2024-11-06 DIAGNOSIS — M85.80 LOW BONE MASS: Primary | ICD-10-CM

## 2024-11-06 PROBLEM — M81.8 OTHER OSTEOPOROSIS WITHOUT CURRENT PATHOLOGICAL FRACTURE: Status: ACTIVE | Noted: 2024-11-06

## 2024-11-06 PROCEDURE — 96372 THER/PROPH/DIAG INJ SC/IM: CPT

## 2024-11-06 PROCEDURE — 2500000004 HC RX 250 GENERAL PHARMACY W/ HCPCS (ALT 636 FOR OP/ED): Mod: SE,TB

## 2024-11-06 RX ORDER — DIPHENHYDRAMINE HYDROCHLORIDE 50 MG/ML
50 INJECTION INTRAMUSCULAR; INTRAVENOUS AS NEEDED
OUTPATIENT
Start: 2025-05-05

## 2024-11-06 RX ORDER — DIPHENHYDRAMINE HYDROCHLORIDE 50 MG/ML
50 INJECTION INTRAMUSCULAR; INTRAVENOUS AS NEEDED
Status: CANCELLED | OUTPATIENT
Start: 2024-11-06

## 2024-11-06 RX ORDER — EPINEPHRINE 0.3 MG/.3ML
0.3 INJECTION SUBCUTANEOUS EVERY 5 MIN PRN
Status: CANCELLED | OUTPATIENT
Start: 2024-11-06

## 2024-11-06 RX ORDER — EPINEPHRINE 0.3 MG/.3ML
0.3 INJECTION SUBCUTANEOUS EVERY 5 MIN PRN
OUTPATIENT
Start: 2025-05-05

## 2024-11-06 RX ORDER — HEPARIN 100 UNIT/ML
500 SYRINGE INTRAVENOUS AS NEEDED
OUTPATIENT
Start: 2024-11-06

## 2024-11-06 RX ORDER — ALBUTEROL SULFATE 0.83 MG/ML
3 SOLUTION RESPIRATORY (INHALATION) AS NEEDED
OUTPATIENT
Start: 2025-05-05

## 2024-11-06 RX ORDER — HEPARIN SODIUM,PORCINE/PF 10 UNIT/ML
50 SYRINGE (ML) INTRAVENOUS AS NEEDED
OUTPATIENT
Start: 2024-11-06

## 2024-11-06 RX ORDER — FAMOTIDINE 10 MG/ML
20 INJECTION INTRAVENOUS ONCE AS NEEDED
OUTPATIENT
Start: 2025-05-05

## 2024-11-06 RX ORDER — ALBUTEROL SULFATE 0.83 MG/ML
3 SOLUTION RESPIRATORY (INHALATION) AS NEEDED
Status: CANCELLED | OUTPATIENT
Start: 2024-11-06

## 2024-11-06 RX ORDER — FAMOTIDINE 10 MG/ML
20 INJECTION INTRAVENOUS ONCE AS NEEDED
Status: CANCELLED | OUTPATIENT
Start: 2024-11-06

## 2024-11-06 SDOH — ECONOMIC STABILITY: FOOD INSECURITY: WITHIN THE PAST 12 MONTHS, THE FOOD YOU BOUGHT JUST DIDN'T LAST AND YOU DIDN'T HAVE MONEY TO GET MORE.: NEVER TRUE

## 2024-11-06 SDOH — ECONOMIC STABILITY: FOOD INSECURITY: WITHIN THE PAST 12 MONTHS, YOU WORRIED THAT YOUR FOOD WOULD RUN OUT BEFORE YOU GOT MONEY TO BUY MORE.: NEVER TRUE

## 2024-11-06 SDOH — ECONOMIC STABILITY: TRANSPORTATION INSECURITY
IN THE PAST 12 MONTHS, HAS THE LACK OF TRANSPORTATION KEPT YOU FROM MEDICAL APPOINTMENTS OR FROM GETTING MEDICATIONS?: NO

## 2024-11-06 SDOH — ECONOMIC STABILITY: INCOME INSECURITY: IN THE LAST 12 MONTHS, WAS THERE A TIME WHEN YOU WERE NOT ABLE TO PAY THE MORTGAGE OR RENT ON TIME?: NO

## 2024-11-06 SDOH — ECONOMIC STABILITY: TRANSPORTATION INSECURITY
IN THE PAST 12 MONTHS, HAS LACK OF TRANSPORTATION KEPT YOU FROM MEETINGS, WORK, OR FROM GETTING THINGS NEEDED FOR DAILY LIVING?: NO

## 2024-11-06 ASSESSMENT — PATIENT HEALTH QUESTIONNAIRE - PHQ9
10. IF YOU CHECKED OFF ANY PROBLEMS, HOW DIFFICULT HAVE THESE PROBLEMS MADE IT FOR YOU TO DO YOUR WORK, TAKE CARE OF THINGS AT HOME, OR GET ALONG WITH OTHER PEOPLE: NOT DIFFICULT AT ALL
2. FEELING DOWN, DEPRESSED OR HOPELESS: NOT AT ALL
1. LITTLE INTEREST OR PLEASURE IN DOING THINGS: NOT AT ALL
SUM OF ALL RESPONSES TO PHQ9 QUESTIONS 1 & 2: 0

## 2024-11-06 ASSESSMENT — COLUMBIA-SUICIDE SEVERITY RATING SCALE - C-SSRS
6. HAVE YOU EVER DONE ANYTHING, STARTED TO DO ANYTHING, OR PREPARED TO DO ANYTHING TO END YOUR LIFE?: NO
1. IN THE PAST MONTH, HAVE YOU WISHED YOU WERE DEAD OR WISHED YOU COULD GO TO SLEEP AND NOT WAKE UP?: NO
2. HAVE YOU ACTUALLY HAD ANY THOUGHTS OF KILLING YOURSELF?: NO

## 2024-11-06 ASSESSMENT — ENCOUNTER SYMPTOMS
OCCASIONAL FEELINGS OF UNSTEADINESS: 0
DEPRESSION: 0
LOSS OF SENSATION IN FEET: 0

## 2024-11-06 ASSESSMENT — SOCIAL DETERMINANTS OF HEALTH (SDOH)
WITHIN THE LAST YEAR, HAVE YOU BEEN HUMILIATED OR EMOTIONALLY ABUSED IN OTHER WAYS BY YOUR PARTNER OR EX-PARTNER?: NO
HOW HARD IS IT FOR YOU TO PAY FOR THE VERY BASICS LIKE FOOD, HOUSING, MEDICAL CARE, AND HEATING?: NOT VERY HARD
WITHIN THE LAST YEAR, HAVE TO BEEN RAPED OR FORCED TO HAVE ANY KIND OF SEXUAL ACTIVITY BY YOUR PARTNER OR EX-PARTNER?: NO
WITHIN THE LAST YEAR, HAVE YOU BEEN KICKED, HIT, SLAPPED, OR OTHERWISE PHYSICALLY HURT BY YOUR PARTNER OR EX-PARTNER?: NO
WITHIN THE LAST YEAR, HAVE YOU BEEN AFRAID OF YOUR PARTNER OR EX-PARTNER?: NO

## 2024-11-06 ASSESSMENT — PAIN SCALES - GENERAL: PAINLEVEL_OUTOF10: 6

## 2024-11-20 DIAGNOSIS — M54.2 CERVICALGIA: ICD-10-CM

## 2024-11-21 RX ORDER — TIZANIDINE 2 MG/1
2 TABLET ORAL EVERY 8 HOURS PRN
Qty: 90 TABLET | Refills: 0 | Status: SHIPPED | OUTPATIENT
Start: 2024-11-21

## 2024-11-25 ENCOUNTER — HOSPITAL ENCOUNTER (OUTPATIENT)
Dept: RADIOLOGY | Facility: HOSPITAL | Age: 69
Discharge: HOME | End: 2024-11-25
Payer: MEDICARE

## 2024-11-25 DIAGNOSIS — K21.9 GASTROESOPHAGEAL REFLUX DISEASE, UNSPECIFIED WHETHER ESOPHAGITIS PRESENT: ICD-10-CM

## 2024-11-25 DIAGNOSIS — E78.2 COMBINED HYPERLIPIDEMIA: ICD-10-CM

## 2024-11-25 DIAGNOSIS — I10 ESSENTIAL HYPERTENSION: ICD-10-CM

## 2024-11-25 PROCEDURE — 72148 MRI LUMBAR SPINE W/O DYE: CPT

## 2024-11-25 PROCEDURE — 72148 MRI LUMBAR SPINE W/O DYE: CPT | Performed by: RADIOLOGY

## 2024-11-25 RX ORDER — OMEPRAZOLE 40 MG/1
40 CAPSULE, DELAYED RELEASE ORAL DAILY
Qty: 90 CAPSULE | Refills: 1 | Status: SHIPPED | OUTPATIENT
Start: 2024-11-25

## 2024-11-25 RX ORDER — HYDROCHLOROTHIAZIDE 12.5 MG/1
12.5 TABLET ORAL DAILY
Qty: 90 TABLET | Refills: 1 | Status: SHIPPED | OUTPATIENT
Start: 2024-11-25

## 2024-11-25 RX ORDER — ATORVASTATIN CALCIUM 40 MG/1
40 TABLET, FILM COATED ORAL NIGHTLY
Qty: 90 TABLET | Refills: 1 | Status: SHIPPED | OUTPATIENT
Start: 2024-11-25

## 2024-12-09 DIAGNOSIS — J30.9 ALLERGIC RHINITIS, UNSPECIFIED SEASONALITY, UNSPECIFIED TRIGGER: ICD-10-CM

## 2024-12-09 DIAGNOSIS — I10 ESSENTIAL HYPERTENSION: ICD-10-CM

## 2024-12-09 RX ORDER — LISINOPRIL 20 MG/1
20 TABLET ORAL DAILY
Qty: 90 TABLET | Refills: 1 | Status: SHIPPED | OUTPATIENT
Start: 2024-12-09

## 2024-12-09 RX ORDER — MONTELUKAST SODIUM 10 MG/1
10 TABLET ORAL NIGHTLY
Qty: 90 TABLET | Refills: 1 | Status: SHIPPED | OUTPATIENT
Start: 2024-12-09

## 2024-12-09 RX ORDER — AMLODIPINE BESYLATE 2.5 MG/1
2.5 TABLET ORAL DAILY
Qty: 90 TABLET | Refills: 1 | Status: SHIPPED | OUTPATIENT
Start: 2024-12-09

## 2024-12-10 DIAGNOSIS — M54.50 LUMBAR PAIN: ICD-10-CM

## 2024-12-10 RX ORDER — GABAPENTIN 300 MG/1
300 CAPSULE ORAL 3 TIMES DAILY
Qty: 90 CAPSULE | Refills: 6 | Status: SHIPPED | OUTPATIENT
Start: 2024-12-10 | End: 2025-12-10

## 2024-12-12 ENCOUNTER — APPOINTMENT (OUTPATIENT)
Dept: RADIOLOGY | Facility: HOSPITAL | Age: 69
End: 2024-12-12
Payer: MEDICARE

## 2024-12-12 DIAGNOSIS — M54.50 LUMBAR PAIN: ICD-10-CM

## 2024-12-12 RX ORDER — GABAPENTIN 600 MG/1
600 TABLET ORAL 3 TIMES DAILY
Qty: 90 TABLET | Refills: 5 | Status: SHIPPED | OUTPATIENT
Start: 2024-12-12 | End: 2025-12-12

## 2024-12-17 ENCOUNTER — APPOINTMENT (OUTPATIENT)
Dept: RADIOLOGY | Facility: HOSPITAL | Age: 69
End: 2024-12-17
Payer: MEDICARE

## 2024-12-29 DIAGNOSIS — M54.2 CERVICALGIA: ICD-10-CM

## 2024-12-30 RX ORDER — TIZANIDINE 2 MG/1
2 TABLET ORAL EVERY 8 HOURS PRN
Qty: 90 TABLET | Refills: 0 | Status: SHIPPED | OUTPATIENT
Start: 2024-12-30

## 2025-01-03 ENCOUNTER — APPOINTMENT (OUTPATIENT)
Dept: RADIOLOGY | Facility: HOSPITAL | Age: 70
End: 2025-01-03
Payer: MEDICARE

## 2025-01-07 ENCOUNTER — HOSPITAL ENCOUNTER (OUTPATIENT)
Dept: RADIOLOGY | Facility: HOSPITAL | Age: 70
Discharge: HOME | End: 2025-01-07
Payer: MEDICARE

## 2025-01-07 VITALS — BODY MASS INDEX: 19.07 KG/M2 | WEIGHT: 101 LBS | HEIGHT: 61 IN

## 2025-01-07 DIAGNOSIS — Z12.31 SCREENING MAMMOGRAM, ENCOUNTER FOR: ICD-10-CM

## 2025-01-07 DIAGNOSIS — E55.9 VITAMIN D DEFICIENCY: ICD-10-CM

## 2025-01-07 PROCEDURE — 77067 SCR MAMMO BI INCL CAD: CPT

## 2025-01-07 PROCEDURE — 77067 SCR MAMMO BI INCL CAD: CPT | Performed by: RADIOLOGY

## 2025-01-07 PROCEDURE — 77063 BREAST TOMOSYNTHESIS BI: CPT | Performed by: RADIOLOGY

## 2025-01-07 RX ORDER — VIT C/E/ZN/COPPR/LUTEIN/ZEAXAN 250MG-90MG
125 CAPSULE ORAL DAILY
Qty: 90 CAPSULE | Refills: 1 | Status: SHIPPED | OUTPATIENT
Start: 2025-01-07

## 2025-01-21 ENCOUNTER — APPOINTMENT (OUTPATIENT)
Dept: INTEGRATIVE MEDICINE | Facility: CLINIC | Age: 70
End: 2025-01-21
Payer: MEDICARE

## 2025-01-24 DIAGNOSIS — M54.2 CERVICALGIA: ICD-10-CM

## 2025-01-24 RX ORDER — TIZANIDINE 2 MG/1
2 TABLET ORAL EVERY 8 HOURS PRN
Qty: 90 TABLET | Refills: 0 | Status: SHIPPED | OUTPATIENT
Start: 2025-01-24

## 2025-01-27 ENCOUNTER — APPOINTMENT (OUTPATIENT)
Dept: PRIMARY CARE | Facility: CLINIC | Age: 70
End: 2025-01-27
Payer: MEDICARE

## 2025-01-27 VITALS
SYSTOLIC BLOOD PRESSURE: 120 MMHG | WEIGHT: 98.8 LBS | OXYGEN SATURATION: 92 % | HEART RATE: 72 BPM | DIASTOLIC BLOOD PRESSURE: 64 MMHG | TEMPERATURE: 97.3 F | HEIGHT: 60 IN | BODY MASS INDEX: 19.39 KG/M2

## 2025-01-27 DIAGNOSIS — Z12.11 SCREENING FOR COLORECTAL CANCER: ICD-10-CM

## 2025-01-27 DIAGNOSIS — Z13.1 SCREENING FOR DIABETES MELLITUS: ICD-10-CM

## 2025-01-27 DIAGNOSIS — Z00.00 ROUTINE GENERAL MEDICAL EXAMINATION AT A HEALTH CARE FACILITY: ICD-10-CM

## 2025-01-27 DIAGNOSIS — Z72.89 OTHER PROBLEMS RELATED TO LIFESTYLE: ICD-10-CM

## 2025-01-27 DIAGNOSIS — E55.9 VITAMIN D DEFICIENCY: ICD-10-CM

## 2025-01-27 DIAGNOSIS — Z13.6 SCREENING FOR CARDIOVASCULAR CONDITION: ICD-10-CM

## 2025-01-27 DIAGNOSIS — M85.80 LOW BONE MASS: ICD-10-CM

## 2025-01-27 DIAGNOSIS — Z12.12 SCREENING FOR COLORECTAL CANCER: ICD-10-CM

## 2025-01-27 DIAGNOSIS — Z13.89 SCREENING FOR MULTIPLE CONDITIONS: ICD-10-CM

## 2025-01-27 DIAGNOSIS — M81.6 LOCALIZED OSTEOPOROSIS (LEQUESNE): ICD-10-CM

## 2025-01-27 DIAGNOSIS — Z13.1 DIABETES MELLITUS SCREENING: ICD-10-CM

## 2025-01-27 DIAGNOSIS — Z13.820 SCREENING FOR OSTEOPOROSIS: ICD-10-CM

## 2025-01-27 DIAGNOSIS — Z71.89 CARDIAC RISK COUNSELING: ICD-10-CM

## 2025-01-27 DIAGNOSIS — Z12.31 ENCOUNTER FOR SCREENING MAMMOGRAM FOR BREAST CANCER: ICD-10-CM

## 2025-01-27 DIAGNOSIS — Z00.00 ROUTINE GENERAL MEDICAL EXAMINATION AT HEALTH CARE FACILITY: Primary | ICD-10-CM

## 2025-01-27 DIAGNOSIS — Z00.00 HEALTHCARE MAINTENANCE: ICD-10-CM

## 2025-01-27 DIAGNOSIS — R79.9 ABNORMAL FINDING OF BLOOD CHEMISTRY, UNSPECIFIED: ICD-10-CM

## 2025-01-27 PROCEDURE — 99397 PER PM REEVAL EST PAT 65+ YR: CPT

## 2025-01-27 PROCEDURE — 3074F SYST BP LT 130 MM HG: CPT

## 2025-01-27 PROCEDURE — 3008F BODY MASS INDEX DOCD: CPT

## 2025-01-27 PROCEDURE — 1160F RVW MEDS BY RX/DR IN RCRD: CPT

## 2025-01-27 PROCEDURE — G0442 ANNUAL ALCOHOL SCREEN 15 MIN: HCPCS

## 2025-01-27 PROCEDURE — G0439 PPPS, SUBSEQ VISIT: HCPCS

## 2025-01-27 PROCEDURE — 1159F MED LIST DOCD IN RCRD: CPT

## 2025-01-27 PROCEDURE — G0296 VISIT TO DETERM LDCT ELIG: HCPCS

## 2025-01-27 PROCEDURE — 1170F FXNL STATUS ASSESSED: CPT

## 2025-01-27 PROCEDURE — 99406 BEHAV CHNG SMOKING 3-10 MIN: CPT

## 2025-01-27 PROCEDURE — 99497 ADVNCD CARE PLAN 30 MIN: CPT

## 2025-01-27 PROCEDURE — 3078F DIAST BP <80 MM HG: CPT

## 2025-01-27 ASSESSMENT — ACTIVITIES OF DAILY LIVING (ADL)
MANAGING_FINANCES: INDEPENDENT
DOING_HOUSEWORK: INDEPENDENT
BATHING: INDEPENDENT
TAKING_MEDICATION: INDEPENDENT
GROCERY_SHOPPING: INDEPENDENT
DRESSING: INDEPENDENT

## 2025-01-27 ASSESSMENT — ENCOUNTER SYMPTOMS
DEPRESSION: 0
OCCASIONAL FEELINGS OF UNSTEADINESS: 0
LOSS OF SENSATION IN FEET: 0

## 2025-01-27 NOTE — PROGRESS NOTES
Subjective   Reason for Visit: Dinora Elias is an 69 y.o. female here for a Medicare Wellness visit.     Past Medical, Surgical, and Family History reviewed and updated in chart.    Reviewed all medications by prescribing practitioner or clinical pharmacist (such as prescriptions, OTCs, herbal therapies and supplements) and documented in the medical record.    HPI    Patient Care Team:  ZAHRAA Anaya-CNP as PCP - General (Family Medicine)     Review of Systems    Objective   Vitals:  /64 (BP Location: Right arm)   Pulse 72   Temp 36.3 °C (97.3 °F)   Ht 1.524 m (5')   Wt (!) 44.8 kg (98 lb 12.8 oz)   SpO2 92%   BMI 19.30 kg/m²       Physical Exam    Assessment & Plan  Low bone mass    Orders:    XR DEXA bone density; Future    Localized osteoporosis ([place])    Orders:    XR DEXA bone density; Future    Screening for colorectal cancer    Orders:    Colonoscopy Screening; Average Risk Patient; Future    Vitamin D deficiency    Orders:    Vitamin D 25-Hydroxy,Total (for eval of Vitamin D levels)    Screening for multiple conditions    Orders:    CBC and Auto Differential    Healthcare maintenance    Orders:    Comprehensive Metabolic Panel    Lipid Panel    TSH with reflex to Free T4 if abnormal    Vitamin B12    Diabetes mellitus screening    Orders:    Hemoglobin A1C    Abnormal finding of blood chemistry, unspecified    Orders:    CBC and Auto Differential    Hemoglobin A1C    Routine general medical examination at health care facility    Orders:    1 Year Follow Up In Primary Care - Wellness Exam; Future    Screening for cardiovascular condition         Cardiac risk counseling         Screening for osteoporosis         Encounter for screening mammogram for breast cancer         Other problems related to lifestyle         Routine general medical examination at a health care facility         Screening for diabetes mellitus                Advance Directives Discussion  16 - 20 minutes were  spent discussing Advanced Care Planning (including a Living Will, Medical Power Of , as well as specific end of life choices and/or directives). The details of that discussion were documented in Advanced Directives Discussion section of the medical record.     Cardiac Risk Assessment  15 - 20 minutes were spent discussing Cardiovascular risk and, if needed, lifestyle modifications were recommended, including nutritional choices, exercise, and elimination of habits contributing to risk.   Aspirin use/disuse was discussed following the guidelines below:  low dose ASA ( mg) should be considered:    If prior Heart Attack/Stroke/Peripheral vascular disease:  Generally recommend daily low dose aspirin unless extremely high bleeding risk (e.g., gastrointestinal).    If no prior Heart Attack/Stroke/Peripheral vascular disease:              Age over 70: Do not use Aspirin for prevention    Age less than 70 and 10-year cardiovascular disease risk is >20%: use low dose Aspirin for prevention.                 Depression Screening  5 - 10 minutes were spent screening for depression.    Tobacco Counseling  3 - 5 minutes were spent counseling the patient on tobacco cessation.  Benefits of cessation were discussed as well as techniques to help quit.

## 2025-01-27 NOTE — PATIENT INSTRUCTIONS
Colonoscopy  RSV Vaccine  Bone Density screening  CT Lung screening    Blood work today    Thank you for coming in today, if any questions or concerns arise, please call my office.   Jamie Mcguire, APRN-CNP

## 2025-01-28 DIAGNOSIS — Z12.11 SCREEN FOR COLON CANCER: ICD-10-CM

## 2025-01-28 LAB
25(OH)D3+25(OH)D2 SERPL-MCNC: 100 NG/ML (ref 30–100)
ALBUMIN SERPL-MCNC: 4.7 G/DL (ref 3.6–5.1)
ALP SERPL-CCNC: 40 U/L (ref 37–153)
ALT SERPL-CCNC: 20 U/L (ref 6–29)
ANION GAP SERPL CALCULATED.4IONS-SCNC: 12 MMOL/L (CALC) (ref 7–17)
AST SERPL-CCNC: 25 U/L (ref 10–35)
BASOPHILS # BLD AUTO: 35 CELLS/UL (ref 0–200)
BASOPHILS NFR BLD AUTO: 0.3 %
BILIRUB SERPL-MCNC: 0.4 MG/DL (ref 0.2–1.2)
BUN SERPL-MCNC: 9 MG/DL (ref 7–25)
CALCIUM SERPL-MCNC: 9.7 MG/DL (ref 8.6–10.4)
CHLORIDE SERPL-SCNC: 94 MMOL/L (ref 98–110)
CHOLEST SERPL-MCNC: 206 MG/DL
CHOLEST/HDLC SERPL: 1.8 (CALC)
CO2 SERPL-SCNC: 27 MMOL/L (ref 20–32)
CREAT SERPL-MCNC: 0.55 MG/DL (ref 0.6–1)
EGFRCR SERPLBLD CKD-EPI 2021: 98 ML/MIN/1.73M2
EOSINOPHIL # BLD AUTO: 35 CELLS/UL (ref 15–500)
EOSINOPHIL NFR BLD AUTO: 0.3 %
ERYTHROCYTE [DISTWIDTH] IN BLOOD BY AUTOMATED COUNT: 12.7 % (ref 11–15)
EST. AVERAGE GLUCOSE BLD GHB EST-MCNC: 108 MG/DL
EST. AVERAGE GLUCOSE BLD GHB EST-SCNC: 6 MMOL/L
GLUCOSE SERPL-MCNC: 73 MG/DL (ref 65–99)
HBA1C MFR BLD: 5.4 % OF TOTAL HGB
HCT VFR BLD AUTO: 38.4 % (ref 35–45)
HDLC SERPL-MCNC: 113 MG/DL
HGB BLD-MCNC: 13.2 G/DL (ref 11.7–15.5)
LDLC SERPL CALC-MCNC: 71 MG/DL (CALC)
LYMPHOCYTES # BLD AUTO: 978 CELLS/UL (ref 850–3900)
LYMPHOCYTES NFR BLD AUTO: 8.5 %
MCH RBC QN AUTO: 33.9 PG (ref 27–33)
MCHC RBC AUTO-ENTMCNC: 34.4 G/DL (ref 32–36)
MCV RBC AUTO: 98.7 FL (ref 80–100)
MONOCYTES # BLD AUTO: 1001 CELLS/UL (ref 200–950)
MONOCYTES NFR BLD AUTO: 8.7 %
NEUTROPHILS # BLD AUTO: 9453 CELLS/UL (ref 1500–7800)
NEUTROPHILS NFR BLD AUTO: 82.2 %
NONHDLC SERPL-MCNC: 93 MG/DL (CALC)
PLATELET # BLD AUTO: 402 THOUSAND/UL (ref 140–400)
PMV BLD REES-ECKER: 9.9 FL (ref 7.5–12.5)
POTASSIUM SERPL-SCNC: 4.2 MMOL/L (ref 3.5–5.3)
PROT SERPL-MCNC: 6.6 G/DL (ref 6.1–8.1)
RBC # BLD AUTO: 3.89 MILLION/UL (ref 3.8–5.1)
SODIUM SERPL-SCNC: 133 MMOL/L (ref 135–146)
TRIGL SERPL-MCNC: 141 MG/DL
TSH SERPL-ACNC: 0.63 MIU/L (ref 0.4–4.5)
VIT B12 SERPL-MCNC: 174 PG/ML (ref 200–1100)
WBC # BLD AUTO: 11.5 THOUSAND/UL (ref 3.8–10.8)

## 2025-01-28 PROCEDURE — RXMED WILLOW AMBULATORY MEDICATION CHARGE

## 2025-01-28 RX ORDER — SOD SULF/POT CHLORIDE/MAG SULF 1.479 G
TABLET ORAL AS NEEDED
Qty: 24 TABLET | Refills: 0 | Status: SHIPPED | OUTPATIENT
Start: 2025-01-28

## 2025-01-29 ENCOUNTER — PHARMACY VISIT (OUTPATIENT)
Dept: PHARMACY | Facility: CLINIC | Age: 70
End: 2025-01-29
Payer: COMMERCIAL

## 2025-01-30 DIAGNOSIS — D72.829 LEUKOCYTOSIS, UNSPECIFIED TYPE: Primary | ICD-10-CM

## 2025-01-31 ENCOUNTER — TELEPHONE (OUTPATIENT)
Dept: RADIOLOGY | Facility: HOSPITAL | Age: 70
End: 2025-01-31
Payer: MEDICARE

## 2025-01-31 NOTE — TELEPHONE ENCOUNTER
Outreach to schedule annual lung cancer screening exam. Scheduled for 2-. Confirms they will attend.

## 2025-01-31 NOTE — RESULT ENCOUNTER NOTE
Results were abnormal... normal labs with exception of her WBC which are on the high side, recheck in 3 months.

## 2025-02-04 ENCOUNTER — PRE-ADMISSION TESTING (OUTPATIENT)
Dept: PREADMISSION TESTING | Facility: HOSPITAL | Age: 70
End: 2025-02-04
Payer: MEDICARE

## 2025-02-04 ENCOUNTER — ANESTHESIA EVENT (OUTPATIENT)
Dept: GASTROENTEROLOGY | Facility: HOSPITAL | Age: 70
End: 2025-02-04

## 2025-02-04 ENCOUNTER — APPOINTMENT (OUTPATIENT)
Dept: CARDIOLOGY | Facility: CLINIC | Age: 70
End: 2025-02-04
Payer: MEDICARE

## 2025-02-04 VITALS — WEIGHT: 99 LBS | HEIGHT: 61 IN | BODY MASS INDEX: 18.69 KG/M2

## 2025-02-04 ASSESSMENT — DUKE ACTIVITY SCORE INDEX (DASI)
CAN YOU TAKE CARE OF YOURSELF (EAT, DRESS, BATHE, OR USE TOILET): YES
CAN YOU WALK INDOORS, SUCH AS AROUND YOUR HOUSE: YES
CAN YOU RUN A SHORT DISTANCE: NO
CAN YOU HAVE SEXUAL RELATIONS: YES
CAN YOU DO LIGHT WORK AROUND THE HOUSE LIKE DUSTING OR WASHING DISHES: YES
CAN YOU DO HEAVY WORK AROUND THE HOUSE LIKE SCRUBBING FLOORS OR LIFTING AND MOVING HEAVY FURNITURE: YES
CAN YOU WALK A BLOCK OR TWO ON LEVEL GROUND: YES
CAN YOU PARTICIPATE IN STRENOUS SPORTS LIKE SWIMMING, SINGLES TENNIS, FOOTBALL, BASKETBALL, OR SKIING: NO
TOTAL_SCORE: 42.7
CAN YOU DO YARD WORK LIKE RAKING LEAVES, WEEDING OR PUSHING A MOWER: YES
CAN YOU PARTICIPATE IN MODERATE RECREATIONAL ACTIVITIES LIKE GOLF, BOWLING, DANCING, DOUBLES TENNIS OR THROWING A BASEBALL OR FOOTBALL: YES
CAN YOU DO MODERATE WORK AROUND THE HOUSE LIKE VACUUMING, SWEEPING FLOORS OR CARRYING GROCERIES: YES
CAN YOU CLIMB A FLIGHT OF STAIRS OR WALK UP A HILL: YES
DASI METS SCORE: 8

## 2025-02-04 NOTE — PREPROCEDURE INSTRUCTIONS
Medication List            Accurate as of February 4, 2025  8:00 AM. Always use your most recent med list.                amLODIPine 2.5 mg tablet  Commonly known as: Norvasc  Take 1 tablet (2.5 mg) by mouth once daily.  Medication Adjustments for Surgery: Take on the morning of surgery     atorvastatin 40 mg tablet  Commonly known as: Lipitor  Take 1 tablet (40 mg) by mouth once daily at bedtime.  Medication Adjustments for Surgery: Do Not take on the morning of surgery     cholecalciferol 125 mcg (5000 UT) capsule  Commonly known as: Vitamin D-3  Take 1 capsule (125 mcg) by mouth once daily.  Additional Medication Adjustments for Surgery: Take last dose 7 days before surgery     fexofenadine 180 mg tablet  Commonly known as: Allegra  Medication Adjustments for Surgery: Do Not take on the morning of surgery     * gabapentin 300 mg capsule  Commonly known as: Neurontin  Take 1 capsule (300 mg) by mouth 3 times a day.  Medication Adjustments for Surgery: Take on the morning of surgery     * gabapentin 600 mg tablet  Commonly known as: Neurontin  Take 1 tablet (600 mg) by mouth 3 times a day.  Medication Adjustments for Surgery: Take on the morning of surgery     hydroCHLOROthiazide 12.5 mg tablet  Commonly known as: Microzide  Take 1 tablet (12.5 mg) by mouth once daily.     ipratropium 42 mcg (0.06 %) nasal spray  Commonly known as: Atrovent  Medication Adjustments for Surgery: Take/Use as prescribed     lisinopril 20 mg tablet  Take 1 tablet (20 mg) by mouth once daily.  Medication Adjustments for Surgery: Take last dose 1 day (24 hours) before surgery     montelukast 10 mg tablet  Commonly known as: Singulair  Take 1 tablet (10 mg) by mouth once daily at bedtime.  Medication Adjustments for Surgery: Do Not take on the morning of surgery     omeprazole 40 mg DR capsule  Commonly known as: PriLOSEC  Take 1 capsule (40 mg) by mouth once daily.  Additional Medication Adjustments for Surgery: Take last dose 7 days  before surgery     Sutab 1.479-0.188- 0.225 gram tablet tablet  Generic drug: sod sulf-pot chloride-mag sulf  (Kit prep take one bottle of 12 tablets by mouth at 6pm night before procedure and take second bottle of 12 tablets by mouth at 4 am day of procedure).Take as directed  Medication Adjustments for Surgery: Take/Use as prescribed     tiZANidine 2 mg tablet  Commonly known as: Zanaflex  Take 1 tablet (2 mg) by mouth every 8 hours if needed for muscle spasms.  Medication Adjustments for Surgery: Do Not take on the morning of surgery     Trelegy Ellipta 100-62.5-25 mcg blister with device  Generic drug: fluticasone-umeclidin-vilanter  Medication Adjustments for Surgery: Take on the morning of surgery           * This list has 2 medication(s) that are the same as other medications prescribed for you. Read the directions carefully, and ask your doctor or other care provider to review them with you.                         NPO Instructions:     Follow PREP instructions.   Day before procedure -CLEAR LIQUIDS ONLY, No dairy products, NOTHING RED OR PURPLE.  Take 1st part of prep- Evening Before Procedure.  Drink lots of liquids.  Day of Procedure- 3-4am Take 2nd Part of Prep.     STOP DRINKING 3 HOURS PRIOR TO PROCEDURE.     **AFTER MIDNIGHT - ONLY WATER, BLACK COFFEE, TEA, CLEAR SODA**  NO GUM, CANDY OR MINTS IN THE MORNING!     Additional Instructions:      Review your medication instructions, take indicated medications and STOP those when applicable.  Wear comfortable, loose fitting clothing.  Please remove ALL jewelry and body piercing's.   All valuables should be left at home.  Bring a glass case or container/solution for contacts.  Bring Photo ID and Insurance card.     Park in back of hospital by ER. Come up to Second floor-OUTPATIENT dept to check-in.    You MUST have an adult  with you. NO DRIVING FOR 24 HOURS AFTER SURGERY.     If you get ill at all the week before your procedure- CALL YOUR  DOCTOR/SURGEON.  Any illness might lead to your procedure being delayed.       Call Outpatient dept at [phone] between 1-3pm the day before your procedure (Friday for Monday procedure), for your arrival time.

## 2025-02-04 NOTE — ANESTHESIA PREPROCEDURE EVALUATION
Patient: Dinora Elias    Procedure Information       Date/Time: 03/13/25 1230    Scheduled providers: Royce Faulkner MD    Procedure: COLONOSCOPY    Location: [facility]            Relevant Problems   Anesthesia (within normal limits)      Cardiac   (+) CAD (coronary artery disease)   (+) Combined hyperlipidemia   (+) Congenital bicuspid aortic valve   (+) Essential hypertension      Pulmonary   (+) Pulmonary emphysema, unspecified emphysema type (Multi)      Neuro (within normal limits)      GI   (+) Esophageal reflux   (+) Irritable bowel syndrome      /Renal (within normal limits)      Liver (within normal limits)      Endocrine (within normal limits)      Hematology (within normal limits)      Musculoskeletal   (+) Congenital scoliosis   (+) Osteoporosis   (+) Scoliosis      HEENT (within normal limits)      ID (within normal limits)      Skin (within normal limits)      GYN (within normal limits)      ENT   (+) Allergic rhinitis     There were no vitals filed for this visit.    Past Surgical History:   Procedure Laterality Date    BREAST BIOPSY Right 10/01/2013    benign    TOTAL VAGINAL HYSTERECTOMY  10/01/2013    Vaginal Hysterectomy     Past Medical History:   Diagnosis Date    Acute gingivitis, plaque induced 12/18/2014    Acute gingivitis    Breast cyst 1995    Encounter for screening for malignant neoplasm of colon 11/03/2014    Encounter for screening for malignant neoplasm of colon    Fibrocystic breast 1995    HTN (hypertension)     Other specified disorders of teeth and supporting structures 01/04/2016    Tooth pain    Otitis media, unspecified, right ear 01/25/2018    Acute right otitis media    Personal history of other diseases of the circulatory system     History of hypertension    Personal history of other diseases of the nervous system and sense organs 05/29/2015    History of acute otitis media    Personal history of other diseases of the respiratory system     History  of chronic obstructive lung disease    Personal history of other diseases of the respiratory system 06/10/2016    History of sinusitis    Personal history of other specified conditions 09/11/2014    History of abdominal pain    Unspecified otitis externa, unspecified ear 09/26/2018    Otitis externa       Current Outpatient Medications:     amLODIPine (Norvasc) 2.5 mg tablet, Take 1 tablet (2.5 mg) by mouth once daily., Disp: 90 tablet, Rfl: 1    atorvastatin (Lipitor) 40 mg tablet, Take 1 tablet (40 mg) by mouth once daily at bedtime., Disp: 90 tablet, Rfl: 1    cholecalciferol (Vitamin D-3) 125 mcg (5000 UT) capsule, Take 1 capsule (125 mcg) by mouth once daily., Disp: 90 capsule, Rfl: 1    sod sulf-pot chloride-mag sulf (Sutab) 1.479-0.188- 0.225 gram tablet tablet, (Kit prep take one bottle of 12 tablets by mouth at 6pm night before procedure and take second bottle of 12 tablets by mouth at 4 am day of procedure).Take as directed, Disp: 24 tablet, Rfl: 0    fexofenadine (Allegra) 180 mg tablet, Take 1 tablet (180 mg) by mouth once daily., Disp: , Rfl:     gabapentin (Neurontin) 300 mg capsule, Take 1 capsule (300 mg) by mouth 3 times a day., Disp: 90 capsule, Rfl: 6    gabapentin (Neurontin) 600 mg tablet, Take 1 tablet (600 mg) by mouth 3 times a day., Disp: 90 tablet, Rfl: 5    hydroCHLOROthiazide (Microzide) 12.5 mg tablet, Take 1 tablet (12.5 mg) by mouth once daily., Disp: 90 tablet, Rfl: 1    ipratropium (Atrovent) 42 mcg (0.06 %) nasal spray, Administer into affected nostril(s). (Patient not taking: Reported on 1/27/2025), Disp: , Rfl:     lisinopril 20 mg tablet, Take 1 tablet (20 mg) by mouth once daily., Disp: 90 tablet, Rfl: 1    montelukast (Singulair) 10 mg tablet, Take 1 tablet (10 mg) by mouth once daily at bedtime., Disp: 90 tablet, Rfl: 1    omeprazole (PriLOSEC) 40 mg DR capsule, Take 1 capsule (40 mg) by mouth once daily., Disp: 90 capsule, Rfl: 1    tiZANidine (Zanaflex) 2 mg tablet, Take 1  tablet (2 mg) by mouth every 8 hours if needed for muscle spasms., Disp: 90 tablet, Rfl: 0    Trelegy Ellipta 100-62.5-25 mcg blister with device, Inhale 1 puff once daily., Disp: , Rfl:   Prior to Admission medications    Medication Sig Start Date End Date Taking? Authorizing Provider   amLODIPine (Norvasc) 2.5 mg tablet Take 1 tablet (2.5 mg) by mouth once daily. 12/9/24   ALEXANDRE Anaya   atorvastatin (Lipitor) 40 mg tablet Take 1 tablet (40 mg) by mouth once daily at bedtime. 11/25/24   ALEXANDRE Anaya   cholecalciferol (Vitamin D-3) 125 mcg (5000 UT) capsule Take 1 capsule (125 mcg) by mouth once daily. 1/7/25   ALEXANDRE Anaya   sod sulf-pot chloride-mag sulf (Sutab) 1.479-0.188- 0.225 gram tablet tablet (Kit prep take one bottle of 12 tablets by mouth at 6pm night before procedure and take second bottle of 12 tablets by mouth at 4 am day of procedure).Take as directed 1/28/25   Royce Faulkner MD   fexofenadine (Allegra) 180 mg tablet Take 1 tablet (180 mg) by mouth once daily. 5/28/22   Historical Provider, MD   gabapentin (Neurontin) 300 mg capsule Take 1 capsule (300 mg) by mouth 3 times a day. 12/10/24 12/10/25  Cheyanne Vargas MD   gabapentin (Neurontin) 600 mg tablet Take 1 tablet (600 mg) by mouth 3 times a day. 12/12/24 12/12/25  Cheyanne Vargas MD   hydroCHLOROthiazide (Microzide) 12.5 mg tablet Take 1 tablet (12.5 mg) by mouth once daily. 11/25/24   ALEXANDRE Anaya   ipratropium (Atrovent) 42 mcg (0.06 %) nasal spray Administer into affected nostril(s).  Patient not taking: Reported on 1/27/2025 6/12/21   Historical Provider, MD   lisinopril 20 mg tablet Take 1 tablet (20 mg) by mouth once daily. 12/9/24   ALEXANDRE Anaya   montelukast (Singulair) 10 mg tablet Take 1 tablet (10 mg) by mouth once daily at bedtime. 12/9/24   ZAHRAA Anaya-CNP   omeprazole (PriLOSEC) 40 mg DR capsule Take 1 capsule (40 mg) by mouth once daily.  "24   Jamie Mcguire, APRN-CNP   tiZANidine (Zanaflex) 2 mg tablet Take 1 tablet (2 mg) by mouth every 8 hours if needed for muscle spasms. 25   MD Qing Ramirez Ellipta 100-62.5-25 mcg blister with device Inhale 1 puff once daily. 4/10/23   Historical Provider, MD     No Known Allergies  Social History     Tobacco Use    Smoking status: Former     Current packs/day: 0.00     Average packs/day: 0.5 packs/day for 44.0 years (22.0 ttl pk-yrs)     Types: Cigarettes     Start date:      Quit date: 2023     Years since quittin.0    Smokeless tobacco: Current    Tobacco comments:     Vape pen every day   Substance Use Topics    Alcohol use: Yes     Alcohol/week: 7.0 standard drinks of alcohol     Types: 7 Standard drinks or equivalent per week     Comment: occasional         Chemistry    Lab Results   Component Value Date/Time     (L) 2025 1342    K 4.2 2025 1342    CL 94 (L) 2025 1342    CO2 27 2025 1342    BUN 9 2025 1342    CREATININE 0.55 (L) 2025 1342    Lab Results   Component Value Date/Time    CALCIUM 9.7 2025 1342    ALKPHOS 40 2025 1342    AST 25 2025 1342    ALT 20 2025 1342    BILITOT 0.4 2025 1342          Lab Results   Component Value Date/Time    WBC 11.5 (H) 2025 1342    HGB 13.2 2025 1342    HCT 38.4 2025 1342     (H) 2025 1342     No results found for: \"PROTIME\", \"PTT\", \"INR\"  No results found for this or any previous visit (from the past 4464 hours).  No results found for this or any previous visit from the past 1095 days.    Clinical information reviewed:                 Chart reviewed.  Cardiac clearance ordered.    Nuclear Stress Test 23-  IMPRESSION:  1. Negative myocardial perfusion study without evidence of inducible ischemia or prior infarction.  2. The left ventricle is normal in size.  3. Normal LV wall motion both resting and post stress LV EF " estimated at greater than 65%.    3/29/23 CT cardiac scoring-  IMPRESSION:  1. Coronary artery calcium score of 1134.04*. The referring physician was notified via critical results.    NPO Detail:  No data recorded     PHYSICAL EXAM    Anesthesia Plan

## 2025-02-05 ENCOUNTER — HOSPITAL ENCOUNTER (OUTPATIENT)
Dept: CARDIOLOGY | Facility: HOSPITAL | Age: 70
Discharge: HOME | End: 2025-02-05
Payer: MEDICARE

## 2025-02-05 ENCOUNTER — OFFICE VISIT (OUTPATIENT)
Dept: CARDIOLOGY | Facility: CLINIC | Age: 70
End: 2025-02-05
Payer: MEDICARE

## 2025-02-05 VITALS
HEIGHT: 61 IN | BODY MASS INDEX: 18.5 KG/M2 | WEIGHT: 98 LBS | HEART RATE: 86 BPM | OXYGEN SATURATION: 96 % | SYSTOLIC BLOOD PRESSURE: 115 MMHG | DIASTOLIC BLOOD PRESSURE: 78 MMHG

## 2025-02-05 DIAGNOSIS — I10 ESSENTIAL HYPERTENSION: ICD-10-CM

## 2025-02-05 DIAGNOSIS — E78.2 COMBINED HYPERLIPIDEMIA: ICD-10-CM

## 2025-02-05 DIAGNOSIS — I25.10 CORONARY ARTERY DISEASE INVOLVING NATIVE CORONARY ARTERY OF NATIVE HEART WITHOUT ANGINA PECTORIS: ICD-10-CM

## 2025-02-05 DIAGNOSIS — Z01.810 PREOPERATIVE CARDIOVASCULAR EXAMINATION: Primary | ICD-10-CM

## 2025-02-05 LAB
ATRIAL RATE: 83 BPM
P AXIS: 72 DEGREES
P OFFSET: 197 MS
P ONSET: 142 MS
PR INTERVAL: 164 MS
Q ONSET: 224 MS
QRS COUNT: 14 BEATS
QRS DURATION: 88 MS
QT INTERVAL: 384 MS
QTC CALCULATION(BAZETT): 451 MS
QTC FREDERICIA: 428 MS
R AXIS: -11 DEGREES
T AXIS: 64 DEGREES
T OFFSET: 416 MS
VENTRICULAR RATE: 83 BPM

## 2025-02-05 PROCEDURE — 93005 ELECTROCARDIOGRAM TRACING: CPT

## 2025-02-05 PROCEDURE — 3078F DIAST BP <80 MM HG: CPT | Performed by: NURSE PRACTITIONER

## 2025-02-05 PROCEDURE — 3008F BODY MASS INDEX DOCD: CPT | Performed by: NURSE PRACTITIONER

## 2025-02-05 PROCEDURE — 99213 OFFICE O/P EST LOW 20 MIN: CPT | Performed by: NURSE PRACTITIONER

## 2025-02-05 PROCEDURE — 3074F SYST BP LT 130 MM HG: CPT | Performed by: NURSE PRACTITIONER

## 2025-02-05 PROCEDURE — 1159F MED LIST DOCD IN RCRD: CPT | Performed by: NURSE PRACTITIONER

## 2025-02-05 NOTE — PROGRESS NOTES
Primary Care Physician: Jamie Mcguire, ZAHRAA-CNP      Date of Visit: 02/05/2025 11:00 AM EST  [facility] of visit:  [facility]   Type of Visit: Follow up           Chief Complaint   Patient presents with    Follow-up     Here for cardiac clearance for a colonoscopy on 3-13-25       HPI / Summary:   Dinora Elias is a 69 y.o. female  with PMHx: HTN, HLD, active smoker, ASCVD on chest CT with negative testing 2023 who returns for preoperative evaluation for routine Colonoscopy 3- with Dr. Faulkner    She is not as active as she once was due to severe scoliosis however she is able to do house chores >4 mets  with out symptoms concerning for angina. Denies prior issues with anesthesia. She quit cigarettes 2 years ago, just vaping now and working on quitting.     Home Bps: WNL and about what they are here.   Daughters with carotid artery disease - being monitored, no surgeries    12 system review is negative except as noted above    Medical History:   Past Medical History:   Diagnosis Date    Acute gingivitis, plaque induced 12/18/2014    Acute gingivitis    Breast cyst 1995    CAD (coronary artery disease)     Encounter for screening for malignant neoplasm of colon 11/03/2014    Encounter for screening for malignant neoplasm of colon    Fibrocystic breast 1995    HLD (hyperlipidemia)     HTN (hypertension)     IBS (irritable bowel syndrome)     Other specified disorders of teeth and supporting structures 01/04/2016    Tooth pain    Otitis media, unspecified, right ear 01/25/2018    Acute right otitis media    Personal history of other diseases of the nervous system and sense organs 05/29/2015    History of acute otitis media    Personal history of other diseases of the respiratory system     History of chronic obstructive lung disease    Personal history of other diseases of the respiratory system 06/10/2016    History of sinusitis    Personal history of other specified conditions 09/11/2014    History of  abdominal pain    Unspecified otitis externa, unspecified ear 09/26/2018    Otitis externa       Social History:   Tobacco Use: High Risk (2/5/2025)    Patient History     Smoking Tobacco Use: Former     Smokeless Tobacco Use: Current     Passive Exposure: Not on file         MEDICATIONS:   Current Outpatient Medications   Medication Instructions    amLODIPine (NORVASC) 2.5 mg, oral, Daily    atorvastatin (LIPITOR) 40 mg, oral, Nightly    cholecalciferol (VITAMIN D-3) 125 mcg, oral, Daily    fexofenadine (Allegra) 180 mg tablet 1 tablet, Daily    gabapentin (NEURONTIN) 600 mg, oral, 3 times daily    hydroCHLOROthiazide (MICROZIDE) 12.5 mg, oral, Daily    lisinopril 20 mg, oral, Daily    montelukast (SINGULAIR) 10 mg, oral, Nightly    omeprazole (PRILOSEC) 40 mg, oral, Daily    sod sulf-pot chloride-mag sulf (Sutab) 1.479-0.188- 0.225 gram tablet tablet (Kit prep take one bottle of 12 tablets by mouth at 6pm night before procedure and take second bottle of 12 tablets by mouth at 4 am day of procedure).Take as directed    tiZANidine (ZANAFLEX) 2 mg, oral, Every 8 hours PRN    Trelegy Ellipta 100-62.5-25 mcg blister with device 1 puff, Daily         IMAGING REPORTS INDEPENDENTLY REVIEWED:      NM stress testing 5/17/2023  IMPRESSION:  1. Negative myocardial perfusion study without evidence of inducible  ischemia or prior infarction.  2. The left ventricle is normal in size.  3. Normal LV wall motion both resting and post stress LV EF estimated  at greater than 65%.  Summary:  1. No clinical or electrocardiographic evidence for ischemia at a maximal infusion.  2. Nuclear image results are reported separately.      CT calcium scoring 3/29/2023  FINDINGS:   The score and distribution of calcium in the coronary arteries is as   follows:      LM 0   .12   LCx 284.41   .48      Total 1134.04      The visualized mid/lower ascending thoracic aorta measures 3.3 cm in   diameter. The heart is normal in size. No  "pericardial effusion is   present.      No gross evidence of mediastinal or hilar lymphadenopathy or masses   is identified. The visualized segments of the lungs are normally   expanded.   LABS:    CBC with Differential:    Lab Results   Component Value Date    WBC 11.5 (H) 01/27/2025    RBC 3.89 01/27/2025    HGB 13.2 01/27/2025    HCT 38.4 01/27/2025     (H) 01/27/2025    MCV 98.7 01/27/2025    MCH 33.9 (H) 01/27/2025    MCHC 34.4 01/27/2025    RDW 12.7 01/27/2025    NRBC 0.0 12/12/2023    LYMPHOPCT 8.5 01/27/2025    MONOPCT 8.7 01/27/2025    EOSPCT 0.3 01/27/2025    BASOPCT 0.3 01/27/2025    MONOSABS 0.85 12/12/2023    LYMPHSABS 1.37 12/12/2023    EOSABS 35 01/27/2025    BASOSABS 35 01/27/2025     CMP:    Lab Results   Component Value Date     (L) 01/27/2025    K 4.2 01/27/2025    CL 94 (L) 01/27/2025    CO2 27 01/27/2025    BUN 9 01/27/2025    CREATININE 0.55 (L) 01/27/2025    GLUCOSE 73 01/27/2025    PROT 6.6 01/27/2025    CALCIUM 9.7 01/27/2025    BILITOT 0.4 01/27/2025    ALKPHOS 40 01/27/2025    AST 25 01/27/2025    ALT 20 01/27/2025     BMP:    Lab Results   Component Value Date     (L) 01/27/2025    K 4.2 01/27/2025    CL 94 (L) 01/27/2025    CO2 27 01/27/2025    BUN 9 01/27/2025    CREATININE 0.55 (L) 01/27/2025    CALCIUM 9.7 01/27/2025    GLUCOSE 73 01/27/2025     Magnesium:No results found for: \"MG\"  Troponin:  No results found for: \"TROPHS\"  BNP: No results found for: \"BNP\"      Lipid Panel:  Lab Results   Component Value Date     01/27/2025    CHHDL 1.8 01/27/2025    VLDL 19 12/12/2023    TRIG 141 01/27/2025    NHDL 93 01/27/2025        Lab work and imaging results independently reviewed by me     Visit Vitals  /78   Pulse 86   Ht 1.549 m (5' 1\")   Wt (!) 44.5 kg (98 lb)   SpO2 96%   BMI 18.52 kg/m²   OB Status Hysterectomy   Smoking Status Former   BSA 1.38 m²         ECG dated 2/5/2023 independently reviewed   NSR no STT changes      Vitals reviewed. "   Constitutional:       General: Awake.      Appearance: Normal appearance. Well-developed and not in distress.   Eyes:      General: Lids are normal.      Pupils: Pupils are equal, round, and reactive to light.   HENT:      Right Ear: External ear normal.      Left Ear: External ear normal.      Nose: Nose normal.    Mouth/Throat:      Lips: Pink.      Mouth: Mucous membranes are moist.   Neck:      Vascular: JVD normal.   Pulmonary:      Breath sounds: Normal air entry.   Cardiovascular:      PMI at left midclavicular line. Normal rate. Regular rhythm. Normal S1. Normal S2.       Murmurs: There is no murmur.   Edema:     Peripheral edema absent.   Abdominal:      General: Bowel sounds are normal.      Palpations: Abdomen is soft.      Tenderness: There is no abdominal tenderness.   Musculoskeletal: Normal range of motion.      Cervical back: Full passive range of motion without pain, normal range of motion and neck supple. Skin:     General: Skin is warm and dry.   Neurological:      General: No focal deficit present.      Mental Status: Alert, oriented to person, place, and time and oriented to person, place and time. Mental status is at baseline.   Psychiatric:         Attention and Perception: Attention normal.         Mood and Affect: Mood normal.         Speech: Speech normal.         Behavior: Behavior is cooperative.           Problem List Items Addressed This Visit             ICD-10-CM    Preoperative cardiovascular examination - Primary Z01.810    Essential hypertension I10    Relevant Orders    ECG 12 Lead (Completed)    CAD (coronary artery disease) I25.10    Combined hyperlipidemia E78.2     Dinora is doing well from a CV perspective without angina or symptoms suggestive of decompensated HF     BP is in acceptable range on current RX which She is tolerating well and agrees to continue as follows:   -- Amlodipine 2.5 mg daily  --HCTZ 12.5 mg Daily  --Lisinopril 20 mg daily    Most recent LDL 71 mg/dL    -- Atorvastatin 40mg daily  --Mediterranean / DASH diet   --150 minutes of symptom limited exercise / week    Former smoker, quit 2 years ago  We discussed the importance of complete smoking cessation for 3 minutes.  She  is currently vaping and working on cutting back/quitting.    RCRI score is 0 indicating a 3.9%risk of 30 day MI, cardiac arrest or death  May proceed to OR with Acceptable risk of perioperative event for low risk procedure.     No further cardiac testing required.   Follow up as needed or call with any questions or concerns  Reviewed worrisome cardiac S/sx and when to seek emergency care    02/05/25 at 2:18 PM - ZAHRAA Perdomo-CNP      Orders:  Orders Placed This Encounter   Procedures    ECG 12 Lead         Followup Appts:  Future Appointments   Date Time Provider Department Center   2/21/2025 12:00 PM GEN CT 1 [facility]   3/13/2025 12:30 PM Royce Faulkner MD [address]   5/5/2025  2:00 PM INF 03 [facility]

## 2025-02-16 DIAGNOSIS — M54.2 CERVICALGIA: ICD-10-CM

## 2025-02-17 RX ORDER — TIZANIDINE 2 MG/1
2 TABLET ORAL EVERY 8 HOURS PRN
Qty: 90 TABLET | Refills: 0 | Status: SHIPPED | OUTPATIENT
Start: 2025-02-17

## 2025-02-21 ENCOUNTER — HOSPITAL ENCOUNTER (OUTPATIENT)
Dept: RADIOLOGY | Facility: HOSPITAL | Age: 70
Discharge: HOME | End: 2025-02-21
Payer: MEDICARE

## 2025-02-21 DIAGNOSIS — Z87.891 PERSONAL HISTORY OF NICOTINE DEPENDENCE: ICD-10-CM

## 2025-02-21 PROCEDURE — 71271 CT THORAX LUNG CANCER SCR C-: CPT

## 2025-03-06 ENCOUNTER — OFFICE VISIT (OUTPATIENT)
Dept: PAIN MEDICINE | Facility: HOSPITAL | Age: 70
End: 2025-03-06
Payer: MEDICARE

## 2025-03-06 DIAGNOSIS — M54.14 THORACIC RADICULOPATHY: ICD-10-CM

## 2025-03-06 PROCEDURE — 99213 OFFICE O/P EST LOW 20 MIN: CPT | Performed by: ANESTHESIOLOGY

## 2025-03-06 RX ORDER — TRAMADOL HYDROCHLORIDE 50 MG/1
50 TABLET ORAL 3 TIMES DAILY PRN
Qty: 21 TABLET | Refills: 0 | Status: SHIPPED | OUTPATIENT
Start: 2025-03-06 | End: 2025-03-13

## 2025-03-06 NOTE — PROGRESS NOTES
Chief complaint: Back pain    WILLA Farias is a 70-year-old female with a history of back and leg symptoms who returns today to discuss her low back pain.  The patient was last seen for a bilateral L4 transforaminal which was completed on 8/26/2024, relief was 65 to 70% relief and lasted for months or more.  The patient also had a T8-9 thoracic epidural on 6/10/2024, reports this injection gave her 75% or more relief that lasted 6 months or more.    The patient has followed with Dr. Joyner with neurosurgical spine who feels she is not a surgical candidate due to low bone density and ongoing smoking.  Patient is supposed to have an updated bone density scan and has been undergoing treatment for low bone density.    Today the patient says that the pain she is experiencing is in the thoracic area.  She says that this is similar to the pain that we treated in the past, is the same pain that was treated with an injection previously.  She says that the pain impairs her ability to function and she would like, walking and standing is much as she would want.  Pain at times can be an 8 out of 10 or more.  Lying down or sitting can improve it and activity generally makes it worse.  She does get some thoracic radicular symptoms as well.    Medication list includes gabapentin, tizanidine.      ROS: 13 point review of systems is complete and is negative listed above in HPI    Past Medical History:   Diagnosis Date    Acute gingivitis, plaque induced 12/18/2014    Acute gingivitis    Breast cyst 1995    CAD (coronary artery disease)     Encounter for screening for malignant neoplasm of colon 11/03/2014    Encounter for screening for malignant neoplasm of colon    Fibrocystic breast 1995    HLD (hyperlipidemia)     HTN (hypertension)     IBS (irritable bowel syndrome)     Other specified disorders of teeth and supporting structures 01/04/2016    Tooth pain    Otitis media, unspecified, right ear 01/25/2018    Acute right otitis media     Personal history of other diseases of the nervous system and sense organs 2015    History of acute otitis media    Personal history of other diseases of the respiratory system     History of chronic obstructive lung disease    Personal history of other diseases of the respiratory system 06/10/2016    History of sinusitis    Personal history of other specified conditions 2014    History of abdominal pain    Unspecified otitis externa, unspecified ear 2018    Otitis externa       Past Surgical History:   Procedure Laterality Date    BREAST BIOPSY Right 10/01/2013    benign    TOTAL VAGINAL HYSTERECTOMY  10/01/2013    Vaginal Hysterectomy       Family History   Problem Relation Name Age of Onset    Lung cancer Mother      Other (cerebromeningeal hemmorrhage) Father      Breast cancer Sister  64       Social History     Tobacco Use    Smoking status: Former     Current packs/day: 0.00     Average packs/day: 0.5 packs/day for 44.0 years (22.0 ttl pk-yrs)     Types: Cigarettes     Start date:      Quit date: 2023     Years since quittin.1    Smokeless tobacco: Current    Tobacco comments:     Vape pen every day   Vaping Use    Vaping status: Every Day    Substances: Nicotine   Substance Use Topics    Alcohol use: Yes     Alcohol/week: 7.0 standard drinks of alcohol     Types: 7 Standard drinks or equivalent per week     Comment: occasional    Drug use: Never       Current Outpatient Medications on File Prior to Visit   Medication Sig Dispense Refill    amLODIPine (Norvasc) 2.5 mg tablet Take 1 tablet (2.5 mg) by mouth once daily. 90 tablet 1    atorvastatin (Lipitor) 40 mg tablet Take 1 tablet (40 mg) by mouth once daily at bedtime. 90 tablet 1    cholecalciferol (Vitamin D-3) 125 mcg (5000 UT) capsule Take 1 capsule (125 mcg) by mouth once daily. 90 capsule 1    sod sulf-pot chloride-mag sulf (Sutab) 1.479-0.188- 0.225 gram tablet tablet (Kit prep take one bottle of 12 tablets by mouth at  6pm night before procedure and take second bottle of 12 tablets by mouth at 4 am day of procedure).Take as directed 24 tablet 0    fexofenadine (Allegra) 180 mg tablet Take 1 tablet (180 mg) by mouth once daily.      gabapentin (Neurontin) 600 mg tablet Take 1 tablet (600 mg) by mouth 3 times a day. 90 tablet 5    hydroCHLOROthiazide (Microzide) 12.5 mg tablet Take 1 tablet (12.5 mg) by mouth once daily. 90 tablet 1    lisinopril 20 mg tablet Take 1 tablet (20 mg) by mouth once daily. 90 tablet 1    montelukast (Singulair) 10 mg tablet Take 1 tablet (10 mg) by mouth once daily at bedtime. 90 tablet 1    omeprazole (PriLOSEC) 40 mg DR capsule Take 1 capsule (40 mg) by mouth once daily. 90 capsule 1    tiZANidine (Zanaflex) 2 mg tablet Take 1 tablet (2 mg) by mouth every 8 hours if needed for muscle spasms. 90 tablet 0    Trelegy Ellipta 100-62.5-25 mcg blister with device Inhale 1 puff once daily.       No current facility-administered medications on file prior to visit.        No Known Allergies       Imaging:  Narrative & Impression   Interpreted By:  Micki Connelly,   STUDY:  MR LUMBAR SPINE WO IV CONTRAST; 11/25/2024 2:29 pm      INDICATION:  Signs/Symptoms:Lumbar claudication.      COMPARISON:  MRI of lumbar spine from 05/20/2023      ACCESSION NUMBER(S):  [id number]      ORDERING CLINICIAN:  ARMANDO FRANKS      TECHNIQUE:  Sagittal and axial T1 and T2 weighted images of the lumbar spine were  acquired. Sagittal STIR imaging was also performed      FINDINGS:  Is left convex scoliosis of the thoracolumbar spine as before.      The vertebral bodies demonstrate expected height. Grade 1  anterolisthesis of L4 over L5 seen as before. There are bilateral  pars interarticularis defects of L4 without edema as before.      Patchy marrow edema is noted within L1 and L2 vertebral bodies,  mildly increased as compared to prior study.      There is desiccation and degeneration of intervertebral discs. There  is multilevel  osteophytic spurring.      The lower thoracic cord is unremarkable. The conus terminates  appropriately at L2.      At L5-S1 there is circumferential disc bulge with bilateral facet  hypertrophy. There are small bilateral facet joint effusions. There  is a synovial cyst arising from the left-sided facet joint measuring  5 x 3 mm which encroaches upon the left lateral recess. No overall  central canal stenosis. Mild right neural foraminal narrowing another  small cystic focus is noted posterior to the exiting nerve root  within the right-sided L5-S1 neural foramina. This may reflect an  additional degenerative or synovial cyst. It causes mild encroachment  upon the adjacent neural foramina.      At L4-L5 there is posterior osteophytic spurring with anterolisthesis  and bilateral facet and ligamentum flavum hypertrophy. Severe central  canal stenosis with severe left neural foraminal narrowing. The  central canal caliber has worsened since prior study.      At L3-L4, posterior disc bulge with bilateral facet hypertrophy.  There is moderate right facet joint effusion. Mild central canal  stenosis with mild right and mild left neural foraminal narrowing.  The findings are mildly progressed since prior study.      At L2-L3, posterior disc bulge with osteophytic spurring and  bilateral facet hypertrophy. Mild central canal stenosis with mild  bilateral neural foraminal narrowing., slightly worse since prior  study.      At L1-L2, posterior disc bulge with bilateral facet hypertrophy.  Narrowing of right lateral recess with mild overall central canal  stenosis. Moderate right neural foraminal narrowing. The findings are  similar to prior study.      T12-L1, no central canal stenosis or neural foraminal narrowing.      There is a cyst within the left lobe of liver, incompletely assessed  on current exam, also seen on prior study.      IMPRESSION:  Left convex scoliosis of the thoracolumbar spine as before.      Lumbar  spondylosis with varying severity of central canal and neural  foraminal narrowing at multiple as detailed. Several of the findings  have worsened since prior study.     Physical Exam:  Gen.: Pleasant and appears to be in no acute distress  Eyes: Pupils are symmetric  ENT: Hearing is grossly intact  Neck: No JVD noted, tracheal position is midline  Respiratory: No gasping or shortness of breath   Cardiovascular: Extremity show no edema   Musculoskeletal: Ambulates without assist device, no sensory or motor deficits, benign musculoskeletal exam today in the office.  Some tenderness to palpation in the mid to lower thoracic area in the midline.  Neurologic: Cranial nerves II through XII are grossly intact  Psychiatric:  Patient is alert and oriented x3    Impression/Plan:  70-year-old female with a history of degenerative changes in the thoracic spine as well as known spinal stenosis which is more high-grade.  The patient has had medication management, physical therapy, and interventional pain procedures.  Patient does get significant relief with injections the last several months or more at a time.  She would like to pursue a repeat injection for the thoracic area.    -Will plan for repeat T8-9 epidural injection.  Procedure, risk, benefits, alternatives reviewed with the patient.    -If her low back pain recurs in the future we could consider repeating a bilateral transforaminal at L4 where she has her most significant narrowing.    -We discussed that in the future conservative measures may not be beneficial in alleviating her symptoms and she might need to consider surgery.  Would encourage her to keep up with her primary care physician and working on bone density and also to quit smoking.

## 2025-03-07 ENCOUNTER — TELEPHONE (OUTPATIENT)
Dept: PRIMARY CARE | Facility: CLINIC | Age: 70
End: 2025-03-07
Payer: MEDICARE

## 2025-03-07 NOTE — TELEPHONE ENCOUNTER
Called requesting antinausea medication. She states she thinks this is from her pain medication. She called her pain management to get something to help and they directed her to her pcp.

## 2025-03-10 DIAGNOSIS — R11.0 NAUSEA: Primary | ICD-10-CM

## 2025-03-10 RX ORDER — ONDANSETRON 4 MG/1
4 TABLET, ORALLY DISINTEGRATING ORAL EVERY 8 HOURS PRN
Qty: 90 TABLET | Refills: 0 | Status: SHIPPED | OUTPATIENT
Start: 2025-03-10 | End: 2025-04-09

## 2025-03-10 RX ORDER — SODIUM CHLORIDE, SODIUM LACTATE, POTASSIUM CHLORIDE, CALCIUM CHLORIDE 600; 310; 30; 20 MG/100ML; MG/100ML; MG/100ML; MG/100ML
50 INJECTION, SOLUTION INTRAVENOUS CONTINUOUS
OUTPATIENT
Start: 2025-03-10 | End: 2025-03-10

## 2025-03-13 ENCOUNTER — APPOINTMENT (OUTPATIENT)
Dept: GASTROENTEROLOGY | Facility: HOSPITAL | Age: 70
End: 2025-03-13
Payer: MEDICARE

## 2025-03-13 ENCOUNTER — ANESTHESIA (OUTPATIENT)
Dept: GASTROENTEROLOGY | Facility: HOSPITAL | Age: 70
End: 2025-03-13

## 2025-03-14 DIAGNOSIS — M54.2 CERVICALGIA: ICD-10-CM

## 2025-03-14 RX ORDER — TIZANIDINE 2 MG/1
2 TABLET ORAL EVERY 8 HOURS PRN
Qty: 90 TABLET | Refills: 0 | Status: SHIPPED | OUTPATIENT
Start: 2025-03-14

## 2025-03-20 ENCOUNTER — APPOINTMENT (OUTPATIENT)
Dept: RADIOLOGY | Facility: HOSPITAL | Age: 70
End: 2025-03-20
Payer: MEDICARE

## 2025-03-26 ENCOUNTER — APPOINTMENT (OUTPATIENT)
Dept: INTEGRATIVE MEDICINE | Facility: CLINIC | Age: 70
End: 2025-03-26
Payer: MEDICARE

## 2025-04-03 DIAGNOSIS — M54.2 CERVICALGIA: ICD-10-CM

## 2025-04-03 RX ORDER — TIZANIDINE 2 MG/1
2 TABLET ORAL EVERY 8 HOURS PRN
Qty: 90 TABLET | Refills: 0 | Status: SHIPPED | OUTPATIENT
Start: 2025-04-03

## 2025-04-03 NOTE — H&P
Pain Management H&P    History Of Present Illness  Dinora Elias is a 70 y.o. female presents for procedure state below. Endorses no changes in past medical history or medical health since last seen in clinic.      Past Medical History  She has a past medical history of Acute gingivitis, plaque induced (12/18/2014), Breast cyst (1995), CAD (coronary artery disease), Encounter for screening for malignant neoplasm of colon (11/03/2014), Fibrocystic breast (1995), HLD (hyperlipidemia), HTN (hypertension), IBS (irritable bowel syndrome), Other specified disorders of teeth and supporting structures (01/04/2016), Otitis media, unspecified, right ear (01/25/2018), Personal history of other diseases of the nervous system and sense organs (05/29/2015), Personal history of other diseases of the respiratory system, Personal history of other diseases of the respiratory system (06/10/2016), Personal history of other specified conditions (09/11/2014), and Unspecified otitis externa, unspecified ear (09/26/2018).    She has no past medical history of Personal history of irradiation.    Surgical History  She has a past surgical history that includes Breast biopsy (Right, 10/01/2013) and Total vaginal hysterectomy (10/01/2013).     Social History  She reports that she quit smoking about 2 years ago. Her smoking use included cigarettes. She started smoking about 46 years ago. She has a 22 pack-year smoking history. She uses smokeless tobacco. She reports current alcohol use of about 7.0 standard drinks of alcohol per week. She reports that she does not use drugs.    Family History  Family History   Problem Relation Name Age of Onset    Lung cancer Mother      Other (cerebromeningeal hemmorrhage) Father      Breast cancer Sister  64        Allergies  Patient has no known allergies.    Review of Symptoms:   Constitutional: Negative for chills, diaphoresis or fever  HENT: Negative for neck swelling  Eyes:.  Negative for eye  pain  Respiratory:.  Negative for cough, shortness of breath or wheezing    Cardiovascular:.  Negative for chest pain or palpitations  Gastrointestinal:.  Negative for abdominal pain, nausea and vomiting  Genitourinary:.  Negative for urgency  Musculoskeletal:  Positive for back pain. Positive for joint pain. Denies falls within the past 3 months.  Skin: Negative for wounds or itching   Neurological: Negative for dizziness, seizures, loss of consciousness and weakness  Endo/Heme/Allergies: Does not bruise/bleed easily  Psychiatric/Behavioral: Negative for depression. The patient does not appear anxious.      Pre-sedation Evaluation  ASA class 2  Mallampati score 2     PHYSICAL EXAM  Vitals signs reviewed  Constitutional:       General: Not in acute distress     Appearance: Normal appearance. Not ill-appearing.  HENT:     Head: Normocephalic and atraumatic  Eyes:     Conjunctiva/sclera: Conjunctivae normal  Cardiovascular:     Rate and Rhythm: Normal rate and regular rhythm  Pulmonary:     Effort: No respiratory distress  Abdominal:     Palpations: Abdomen is soft  Musculoskeletal: PICKETT  Skin:     General: Skin is warm and dry  Neurological:     General: No focal deficit present  Psychiatric:         Mood and Affect: Mood normal         Behavior: Behavior normal     Last Recorded Vitals  There were no vitals taken for this visit.    Relevant Results  Current Outpatient Medications   Medication Instructions    amLODIPine (NORVASC) 2.5 mg, oral, Daily    atorvastatin (LIPITOR) 40 mg, oral, Nightly    cholecalciferol (VITAMIN D-3) 125 mcg, oral, Daily    fexofenadine (Allegra) 180 mg tablet 1 tablet, Daily    gabapentin (NEURONTIN) 600 mg, oral, 3 times daily    hydroCHLOROthiazide (MICROZIDE) 12.5 mg, oral, Daily    lisinopril 20 mg, oral, Daily    montelukast (SINGULAIR) 10 mg, oral, Nightly    omeprazole (PRILOSEC) 40 mg, oral, Daily    ondansetron ODT (ZOFRAN-ODT) 4 mg, oral, Every 8 hours PRN    sod sulf-pot  chloride-mag sulf (Sutab) 1.479-0.188- 0.225 gram tablet tablet (Kit prep take one bottle of 12 tablets by mouth at 6pm night before procedure and take second bottle of 12 tablets by mouth at 4 am day of procedure).Take as directed    tiZANidine (ZANAFLEX) 2 mg, oral, Every 8 hours PRN    Trelegy Ellipta 100-62.5-25 mcg blister with device 1 puff, Daily         MR lumbar spine wo IV contrast 11/25/2024    Narrative  Interpreted By:  Micki Connelly,  STUDY:  MR LUMBAR SPINE WO IV CONTRAST; 11/25/2024 2:29 pm    INDICATION:  Signs/Symptoms:Lumbar claudication.    COMPARISON:  MRI of lumbar spine from 05/20/2023    ACCESSION NUMBER(S):  [id number]    ORDERING CLINICIAN:  ARMANDO FRANKS    TECHNIQUE:  Sagittal and axial T1 and T2 weighted images of the lumbar spine were  acquired. Sagittal STIR imaging was also performed    FINDINGS:  Is left convex scoliosis of the thoracolumbar spine as before.    The vertebral bodies demonstrate expected height. Grade 1  anterolisthesis of L4 over L5 seen as before. There are bilateral  pars interarticularis defects of L4 without edema as before.    Patchy marrow edema is noted within L1 and L2 vertebral bodies,  mildly increased as compared to prior study.    There is desiccation and degeneration of intervertebral discs. There  is multilevel osteophytic spurring.    The lower thoracic cord is unremarkable. The conus terminates  appropriately at L2.    At L5-S1 there is circumferential disc bulge with bilateral facet  hypertrophy. There are small bilateral facet joint effusions. There  is a synovial cyst arising from the left-sided facet joint measuring  5 x 3 mm which encroaches upon the left lateral recess. No overall  central canal stenosis. Mild right neural foraminal narrowing another  small cystic focus is noted posterior to the exiting nerve root  within the right-sided L5-S1 neural foramina. This may reflect an  additional degenerative or synovial cyst. It causes mild  encroachment  upon the adjacent neural foramina.    At L4-L5 there is posterior osteophytic spurring with anterolisthesis  and bilateral facet and ligamentum flavum hypertrophy. Severe central  canal stenosis with severe left neural foraminal narrowing. The  central canal caliber has worsened since prior study.    At L3-L4, posterior disc bulge with bilateral facet hypertrophy.  There is moderate right facet joint effusion. Mild central canal  stenosis with mild right and mild left neural foraminal narrowing.  The findings are mildly progressed since prior study.    At L2-L3, posterior disc bulge with osteophytic spurring and  bilateral facet hypertrophy. Mild central canal stenosis with mild  bilateral neural foraminal narrowing., slightly worse since prior  study.    At L1-L2, posterior disc bulge with bilateral facet hypertrophy.  Narrowing of right lateral recess with mild overall central canal  stenosis. Moderate right neural foraminal narrowing. The findings are  similar to prior study.    T12-L1, no central canal stenosis or neural foraminal narrowing.    There is a cyst within the left lobe of liver, incompletely assessed  on current exam, also seen on prior study.    Impression  Left convex scoliosis of the thoracolumbar spine as before.    Lumbar spondylosis with varying severity of central canal and neural  foraminal narrowing at multiple as detailed. Several of the findings  have worsened since prior study.    Signed by: Micki Connelly 11/26/2024 1:43 PM  Dictation workstation:   [id number]      MR lumbar spine wo IV contrast 05/24/2023    Narrative  Interpreted By:  KATT OJEDA MD  MRN: [id number]  Patient Name: JEFFREY MCKNIGHT    STUDY:  MRI L-SPINE WO;  5/24/2023 2:48 pm    INDICATION:  back pain, DDD.    COMPARISON:  Radiograph 02/07/2023.    ACCESSION NUMBER(S):  [id number]    ORDERING CLINICIAN:  ERICA URBINA    TECHNIQUE:  Sagittal T1, T2, STIR, axial T1 and T2 weighted images of the  lumbar  spine were acquired.    FINDINGS:  For counting purposes the last lumbarized vertebral body is labeled  L5.    There is S shaped scoliosis of the lumbar spine. There is  levoconvexity of the upper lumbar spine and dextro convexity of the  lower lumbar spine.    There is grade 1 anterolisthesis of L4 on L5. Alignment, vertebral  body heights and marrow signal pattern otherwise within normal  limits. Edema within the endplates at L4-L5 is likely degenerative.  There is desiccated disc signal throughout the lumbar spine with  moderate to severe disc height loss at L1-L2 and L4-L5. The conus  terminates at L1-L2 and is unremarkable.    Incompletely evaluated T2 hyperintense lesion within the liver may  represent a cyst. Evaluation of the paraspinal soft tissues is  otherwise unremarkable.    Evaluation by level:    T12-L1: No significant spinal canal or neural foraminal stenosis.    L1-L2: Right eccentric disc bulge and facet arthrosis. No significant  spinal canal stenosis. No significant left and moderate right neural  foraminal stenosis.    L2-L3: Disc bulge, facet arthrosis and ligamentum flavum thickening.  Mild spinal canal stenosis. Moderate right and mild-to-moderate left  neural foraminal stenosis.    L3-L4: Disc bulge, facet arthrosis and ligamentum flavum thickening.  Mild spinal canal stenosis. Moderate left and mild-to-moderate right  neural foraminal stenosis.    L4-L5: Disc bulge, facet arthrosis and ligamentum flavum thickening.  Severe spinal canal stenosis. Severe left and no significant right  neural foraminal stenosis.    L5-S1: No significant spinal canal or neural foraminal stenosis.    Impression  S shaped scoliosis of the lumbar spine with multilevel degenerative  disc disease and facet arthrosis. There is severe spinal canal and  severe left neural foraminal stenosis at L4-L5.      MR cervical spine wo IV contrast 05/24/2023    Narrative  Interpreted By:  KATT OJEDA MD  MRN:  [id number]  Patient Name: JEFFREY MCKNIGHT    STUDY:  MRI CERVICAL WO;  5/24/2023 2:43 pm    INDICATION:  severe c-spine degenerative disease on x-ray, neck pain.    COMPARISON:  Radiograph 02/07/2023.    ACCESSION NUMBER(S):  [id number]    ORDERING CLINICIAN:  ERICA URBINA    TECHNIQUE:  Sagittal T1, T2, STIR, axial T1 and axial T2 weighted images were  acquired through the cervical spine.    FINDINGS:  Craniocervical junction is within normal limits. Cerebellar tonsils  are above the foramen magnum. There is straightening of the normal  cervical lordosis. There is trace anterolisthesis of C3 on C4 and C4  on C5. Alignment, vertebral body heights and marrow signal pattern  are otherwise within normal limits. There is desiccated disc signal  throughout the cervical spine with moderate disc height loss  degenerative endplate changes at C5-C6 and C6-C7. Cervical cord is  unremarkable. Prevertebral soft tissues are not thickened.    Evaluation by level:    C1-C2: No significant spinal canal stenosis    C2-C3: Mild disc osteophyte complex, uncovertebral joint hypertrophy  and facet arthrosis. No significant spinal canal stenosis. Mild left  and no significant right neural foraminal stenosis    C3-C4: Mild left eccentric disc osteophyte complex, uncovertebral  joint hypertrophy and facet arthrosis. Mild spinal canal stenosis.  Moderate left and mild right neural foraminal stenosis    C4-C5: Disc osteophyte complex, uncovertebral joint hypertrophy and  facet arthrosis. Moderate spinal canal stenosis. Moderate to severe  right and no significant left neural foraminal stenosis    C5-C6: Disc osteophyte complex, uncovertebral joint hypertrophy and  facet arthrosis. Moderate spinal canal and neural foraminal stenosis.    C6-C7: Disc osteophyte complex, uncovertebral joint hypertrophy and  facet arthrosis. Mild spinal canal stenosis. Moderate left and  mild-to-moderate right neural foraminal stenosis    C7-T1: No significant  spinal canal or neural foraminal stenosis.    Impression  Multilevel degenerative disc disease and facet arthrosis with  moderate spinal canal stenosis at C4-C5, and C5-C6. There is moderate  to severe right neural foraminal stenosis at C4-C5 and moderate  neural foraminal stenosis at C3-C4 on the left, C5-C6 and C6-C7  bilaterally.     Transforaminal  Preoperative diagnosis/postoperative diagnosis: Lumbar stenosis, lumbar   radiculitis, scoliosis  Procedure: Bilateral L4 lumbar transforaminal epidural steroid injection   under fluoroscopic guidance  Surgeon: Cheyanne Vargas  Assistant:  Fellow, Maxwell Cardozo  Anesthesia: Local   Complications: Apparently none    Clinical note: Dinora is a 69-year-old female with a history of low back   and leg symptoms who presents today for aforementioned procedure.    Procedure note: The patient was met in the preoperative holding area after   risks benefits and alternatives to procedure were discussed with the   patient, informed written consent was obtained. Patient brought back to   the procedure room and she positioned herself comfortably into the prone   position on the fluoroscopy table. Area over the back was exposed,   prepped, draped, in the usual sterile fashion, chlorhexidine utilized.    Skin and subcutaneous tissues to the bilateral L4 neuroforamen was   anesthetized using 0.5% lidocaine.  90 mm 22-gauge Sprotte needles were   inserted in the skin and advanced into the foramen bilaterally at L4 first   on the left and right sides. Needle tip position was confirmed in AP   oblique and lateral view.  Left-sided needle did have to be slightly   repositioned and a more lateral entry point utilized to access the   neuroforamen.  In the final position contrast was injected which showed   appropriate epidural spread, no intravascular or intrathecal uptake. A   total of 3 mL of 0.5% lidocaine mixed with 10 mg dexamethasone was   injected in divided doses among the 2 needles.  Needles removed, bandage   applied, patient tolerated the procedure well with no immediate   complications.    Patient does have more of a pinching narrowing on the left side and there   is slightly increased difficulty to access the foramen.  Will see how she   does going forward.  If we are not seeing prolonged relief she may need to   go back and see spine surgery.       No diagnosis found.     ASSESSMENT/PLAN  Dinora Elias is a 70 y.o. female here for T8-9 thoracic interlaminar epidural steroid injection under fluoroscopy    Patient denies any recent antibiotic use or infections, denies any blood thinner use, and denies contrast or local anesthetic allergies     Risks, benefits, alternatives discussed. All questions answered to the best of my ability. Patient agrees to proceed.      Our plan is as follows:  - Proceed with aforementioned procedure          DO BALAJI Gracia Pain fellow

## 2025-04-04 ENCOUNTER — HOSPITAL ENCOUNTER (OUTPATIENT)
Facility: HOSPITAL | Age: 70
Discharge: HOME | End: 2025-04-04
Payer: MEDICARE

## 2025-04-04 VITALS
RESPIRATION RATE: 16 BRPM | HEART RATE: 85 BPM | DIASTOLIC BLOOD PRESSURE: 90 MMHG | HEIGHT: 61 IN | SYSTOLIC BLOOD PRESSURE: 130 MMHG | BODY MASS INDEX: 18.5 KG/M2 | OXYGEN SATURATION: 100 % | WEIGHT: 98 LBS | TEMPERATURE: 98.8 F

## 2025-04-04 DIAGNOSIS — M54.14 THORACIC RADICULOPATHY: ICD-10-CM

## 2025-04-04 PROCEDURE — 2550000001 HC RX 255 CONTRASTS: Performed by: ANESTHESIOLOGY

## 2025-04-04 PROCEDURE — 2500000004 HC RX 250 GENERAL PHARMACY W/ HCPCS (ALT 636 FOR OP/ED): Performed by: ANESTHESIOLOGY

## 2025-04-04 PROCEDURE — 62321 NJX INTERLAMINAR CRV/THRC: CPT | Performed by: ANESTHESIOLOGY

## 2025-04-04 RX ORDER — LIDOCAINE HYDROCHLORIDE 5 MG/ML
INJECTION, SOLUTION INFILTRATION; INTRAVENOUS
Status: COMPLETED | OUTPATIENT
Start: 2025-04-04 | End: 2025-04-04

## 2025-04-04 RX ORDER — MIDAZOLAM HYDROCHLORIDE 1 MG/ML
INJECTION, SOLUTION INTRAMUSCULAR; INTRAVENOUS
Status: COMPLETED | OUTPATIENT
Start: 2025-04-04 | End: 2025-04-04

## 2025-04-04 RX ORDER — METHYLPREDNISOLONE ACETATE 40 MG/ML
INJECTION, SUSPENSION INTRA-ARTICULAR; INTRALESIONAL; INTRAMUSCULAR; SOFT TISSUE
Status: COMPLETED | OUTPATIENT
Start: 2025-04-04 | End: 2025-04-04

## 2025-04-04 RX ADMIN — METHYLPREDNISOLONE ACETATE 40 MG: 40 INJECTION, SUSPENSION INTRA-ARTICULAR; INTRALESIONAL; INTRAMUSCULAR; SOFT TISSUE at 14:51

## 2025-04-04 RX ADMIN — IOHEXOL 1.5 ML: 240 INJECTION, SOLUTION INTRATHECAL; INTRAVASCULAR; INTRAVENOUS; ORAL at 14:51

## 2025-04-04 RX ADMIN — LIDOCAINE HYDROCHLORIDE 5 ML: 5 INJECTION, SOLUTION INFILTRATION at 14:51

## 2025-04-04 RX ADMIN — MIDAZOLAM 2 MG: 1 INJECTION INTRAMUSCULAR; INTRAVENOUS at 14:44

## 2025-04-04 ASSESSMENT — PAIN SCALES - GENERAL
PAINLEVEL_OUTOF10: 0 - NO PAIN
PAINLEVEL_OUTOF10: 3
PAINLEVEL_OUTOF10: 5 - MODERATE PAIN
PAINLEVEL_OUTOF10: 5 - MODERATE PAIN
PAINLEVEL_OUTOF10: 10 - WORST POSSIBLE PAIN

## 2025-04-04 ASSESSMENT — COLUMBIA-SUICIDE SEVERITY RATING SCALE - C-SSRS
2. HAVE YOU ACTUALLY HAD ANY THOUGHTS OF KILLING YOURSELF?: NO
1. IN THE PAST MONTH, HAVE YOU WISHED YOU WERE DEAD OR WISHED YOU COULD GO TO SLEEP AND NOT WAKE UP?: NO
6. HAVE YOU EVER DONE ANYTHING, STARTED TO DO ANYTHING, OR PREPARED TO DO ANYTHING TO END YOUR LIFE?: NO

## 2025-04-16 DIAGNOSIS — M54.14 THORACIC RADICULOPATHY: ICD-10-CM

## 2025-04-17 DIAGNOSIS — M54.16 LUMBAR RADICULOPATHY: ICD-10-CM

## 2025-04-18 RX ORDER — TRAMADOL HYDROCHLORIDE 50 MG/1
50 TABLET ORAL 3 TIMES DAILY PRN
Qty: 21 TABLET | Refills: 0 | Status: SHIPPED | OUTPATIENT
Start: 2025-04-18 | End: 2025-04-25

## 2025-04-30 DIAGNOSIS — D72.829 LEUKOCYTOSIS, UNSPECIFIED TYPE: ICD-10-CM

## 2025-05-04 DIAGNOSIS — M54.2 CERVICALGIA: ICD-10-CM

## 2025-05-04 DIAGNOSIS — E78.2 COMBINED HYPERLIPIDEMIA: ICD-10-CM

## 2025-05-04 DIAGNOSIS — I10 ESSENTIAL HYPERTENSION: ICD-10-CM

## 2025-05-04 DIAGNOSIS — K21.9 GASTROESOPHAGEAL REFLUX DISEASE, UNSPECIFIED WHETHER ESOPHAGITIS PRESENT: ICD-10-CM

## 2025-05-05 ENCOUNTER — INFUSION (OUTPATIENT)
Dept: HEMATOLOGY/ONCOLOGY | Facility: HOSPITAL | Age: 70
End: 2025-05-05
Payer: MEDICARE

## 2025-05-05 VITALS
DIASTOLIC BLOOD PRESSURE: 79 MMHG | WEIGHT: 92.81 LBS | HEIGHT: 60 IN | SYSTOLIC BLOOD PRESSURE: 123 MMHG | BODY MASS INDEX: 18.22 KG/M2 | HEART RATE: 77 BPM | OXYGEN SATURATION: 97 % | TEMPERATURE: 97.7 F | RESPIRATION RATE: 16 BRPM

## 2025-05-05 DIAGNOSIS — M85.80 LOW BONE MASS: ICD-10-CM

## 2025-05-05 LAB
ALBUMIN SERPL BCP-MCNC: 4.3 G/DL (ref 3.4–5)
ALP SERPL-CCNC: 55 U/L (ref 33–136)
ALT SERPL W P-5'-P-CCNC: 36 U/L (ref 7–45)
ANION GAP SERPL CALC-SCNC: 12 MMOL/L (ref 10–20)
AST SERPL W P-5'-P-CCNC: 31 U/L (ref 9–39)
BILIRUB SERPL-MCNC: 0.5 MG/DL (ref 0–1.2)
BUN SERPL-MCNC: 7 MG/DL (ref 6–23)
CA-I BLD-SCNC: 1.13 MMOL/L (ref 1.1–1.33)
CALCIUM SERPL-MCNC: 9 MG/DL (ref 8.6–10.3)
CHLORIDE SERPL-SCNC: 87 MMOL/L (ref 98–107)
CO2 SERPL-SCNC: 29 MMOL/L (ref 21–32)
CREAT SERPL-MCNC: 0.47 MG/DL (ref 0.5–1.05)
EGFRCR SERPLBLD CKD-EPI 2021: >90 ML/MIN/1.73M*2
GLUCOSE SERPL-MCNC: 136 MG/DL (ref 74–99)
POTASSIUM SERPL-SCNC: 3.3 MMOL/L (ref 3.5–5.3)
PROT SERPL-MCNC: 6.6 G/DL (ref 6.4–8.2)
SODIUM SERPL-SCNC: 125 MMOL/L (ref 136–145)

## 2025-05-05 PROCEDURE — 82330 ASSAY OF CALCIUM: CPT

## 2025-05-05 PROCEDURE — 96372 THER/PROPH/DIAG INJ SC/IM: CPT

## 2025-05-05 PROCEDURE — 84075 ASSAY ALKALINE PHOSPHATASE: CPT

## 2025-05-05 PROCEDURE — 2500000004 HC RX 250 GENERAL PHARMACY W/ HCPCS (ALT 636 FOR OP/ED): Mod: JZ,TB

## 2025-05-05 RX ORDER — DIPHENHYDRAMINE HYDROCHLORIDE 50 MG/ML
50 INJECTION, SOLUTION INTRAMUSCULAR; INTRAVENOUS AS NEEDED
OUTPATIENT
Start: 2025-11-01

## 2025-05-05 RX ORDER — FAMOTIDINE 10 MG/ML
20 INJECTION, SOLUTION INTRAVENOUS ONCE AS NEEDED
Status: DISCONTINUED | OUTPATIENT
Start: 2025-05-05 | End: 2025-05-05 | Stop reason: HOSPADM

## 2025-05-05 RX ORDER — ATORVASTATIN CALCIUM 40 MG/1
40 TABLET, FILM COATED ORAL NIGHTLY
Qty: 90 TABLET | Refills: 1 | Status: SHIPPED | OUTPATIENT
Start: 2025-05-05

## 2025-05-05 RX ORDER — TIZANIDINE 2 MG/1
2 TABLET ORAL EVERY 8 HOURS PRN
Qty: 90 TABLET | Refills: 0 | Status: SHIPPED | OUTPATIENT
Start: 2025-05-05

## 2025-05-05 RX ORDER — OMEPRAZOLE 40 MG/1
40 CAPSULE, DELAYED RELEASE ORAL DAILY
Qty: 90 CAPSULE | Refills: 1 | Status: SHIPPED | OUTPATIENT
Start: 2025-05-05

## 2025-05-05 RX ORDER — DIPHENHYDRAMINE HYDROCHLORIDE 50 MG/ML
50 INJECTION, SOLUTION INTRAMUSCULAR; INTRAVENOUS AS NEEDED
Status: DISCONTINUED | OUTPATIENT
Start: 2025-05-05 | End: 2025-05-05 | Stop reason: HOSPADM

## 2025-05-05 RX ORDER — HYDROCHLOROTHIAZIDE 12.5 MG/1
12.5 TABLET ORAL DAILY
Qty: 90 TABLET | Refills: 1 | Status: SHIPPED | OUTPATIENT
Start: 2025-05-05

## 2025-05-05 RX ORDER — FAMOTIDINE 10 MG/ML
20 INJECTION, SOLUTION INTRAVENOUS ONCE AS NEEDED
OUTPATIENT
Start: 2025-11-01

## 2025-05-05 RX ORDER — ALBUTEROL SULFATE 0.83 MG/ML
3 SOLUTION RESPIRATORY (INHALATION) AS NEEDED
OUTPATIENT
Start: 2025-11-01

## 2025-05-05 RX ORDER — EPINEPHRINE 0.3 MG/.3ML
0.3 INJECTION SUBCUTANEOUS EVERY 5 MIN PRN
OUTPATIENT
Start: 2025-11-01

## 2025-05-05 RX ORDER — EPINEPHRINE 0.3 MG/.3ML
0.3 INJECTION SUBCUTANEOUS EVERY 5 MIN PRN
Status: DISCONTINUED | OUTPATIENT
Start: 2025-05-05 | End: 2025-05-05 | Stop reason: HOSPADM

## 2025-05-05 RX ORDER — ALBUTEROL SULFATE 0.83 MG/ML
3 SOLUTION RESPIRATORY (INHALATION) AS NEEDED
Status: DISCONTINUED | OUTPATIENT
Start: 2025-05-05 | End: 2025-05-05 | Stop reason: HOSPADM

## 2025-05-05 RX ADMIN — DENOSUMAB 60 MG: 60 INJECTION SUBCUTANEOUS at 15:06

## 2025-05-05 ASSESSMENT — ENCOUNTER SYMPTOMS
DEPRESSION: 0
LOSS OF SENSATION IN FEET: 0
OCCASIONAL FEELINGS OF UNSTEADINESS: 0

## 2025-05-05 ASSESSMENT — PATIENT HEALTH QUESTIONNAIRE - PHQ9
2. FEELING DOWN, DEPRESSED OR HOPELESS: NOT AT ALL
1. LITTLE INTEREST OR PLEASURE IN DOING THINGS: NOT AT ALL
10. IF YOU CHECKED OFF ANY PROBLEMS, HOW DIFFICULT HAVE THESE PROBLEMS MADE IT FOR YOU TO DO YOUR WORK, TAKE CARE OF THINGS AT HOME, OR GET ALONG WITH OTHER PEOPLE: NOT DIFFICULT AT ALL
SUM OF ALL RESPONSES TO PHQ9 QUESTIONS 1 & 2: 0

## 2025-05-05 ASSESSMENT — PAIN SCALES - GENERAL: PAINLEVEL_OUTOF10: 7

## 2025-05-12 ENCOUNTER — HOSPITAL ENCOUNTER (OUTPATIENT)
Facility: HOSPITAL | Age: 70
Discharge: HOME | End: 2025-05-12
Payer: MEDICARE

## 2025-05-12 VITALS
TEMPERATURE: 98.1 F | SYSTOLIC BLOOD PRESSURE: 118 MMHG | BODY MASS INDEX: 19.21 KG/M2 | WEIGHT: 98 LBS | RESPIRATION RATE: 16 BRPM | DIASTOLIC BLOOD PRESSURE: 83 MMHG | OXYGEN SATURATION: 96 % | HEART RATE: 94 BPM

## 2025-05-12 DIAGNOSIS — M54.16 LUMBAR RADICULOPATHY: ICD-10-CM

## 2025-05-12 PROCEDURE — 2500000004 HC RX 250 GENERAL PHARMACY W/ HCPCS (ALT 636 FOR OP/ED): Mod: JW | Performed by: ANESTHESIOLOGY

## 2025-05-12 PROCEDURE — 3700000012 HC SEDATION LEVEL 5+ TIME - INITIAL 15 MINUTES 5/> YEARS

## 2025-05-12 PROCEDURE — 64483 NJX AA&/STRD TFRM EPI L/S 1: CPT | Mod: 50 | Performed by: ANESTHESIOLOGY

## 2025-05-12 PROCEDURE — 2550000001 HC RX 255 CONTRASTS: Performed by: ANESTHESIOLOGY

## 2025-05-12 PROCEDURE — 64483 NJX AA&/STRD TFRM EPI L/S 1: CPT | Performed by: ANESTHESIOLOGY

## 2025-05-12 RX ORDER — LIDOCAINE HYDROCHLORIDE 5 MG/ML
INJECTION, SOLUTION INFILTRATION; INTRAVENOUS
Status: COMPLETED | OUTPATIENT
Start: 2025-05-12 | End: 2025-05-12

## 2025-05-12 RX ORDER — DEXAMETHASONE SODIUM PHOSPHATE 10 MG/ML
INJECTION INTRAMUSCULAR; INTRAVENOUS
Status: COMPLETED | OUTPATIENT
Start: 2025-05-12 | End: 2025-05-12

## 2025-05-12 RX ORDER — MIDAZOLAM HYDROCHLORIDE 1 MG/ML
INJECTION, SOLUTION INTRAMUSCULAR; INTRAVENOUS
Status: COMPLETED | OUTPATIENT
Start: 2025-05-12 | End: 2025-05-12

## 2025-05-12 RX ADMIN — MIDAZOLAM 2 MG: 1 INJECTION INTRAMUSCULAR; INTRAVENOUS at 12:31

## 2025-05-12 RX ADMIN — IOHEXOL 3 ML: 240 INJECTION, SOLUTION INTRATHECAL; INTRAVASCULAR; INTRAVENOUS; ORAL at 12:41

## 2025-05-12 RX ADMIN — LIDOCAINE HYDROCHLORIDE 8 ML: 5 INJECTION, SOLUTION INFILTRATION at 12:41

## 2025-05-12 RX ADMIN — DEXAMETHASONE SODIUM PHOSPHATE 10 MG: 10 INJECTION, SOLUTION INTRAMUSCULAR; INTRAVENOUS at 12:41

## 2025-05-12 ASSESSMENT — PAIN - FUNCTIONAL ASSESSMENT
PAIN_FUNCTIONAL_ASSESSMENT: 0-10
PAIN_FUNCTIONAL_ASSESSMENT: WONG-BAKER FACES
PAIN_FUNCTIONAL_ASSESSMENT: 0-10
PAIN_FUNCTIONAL_ASSESSMENT: WONG-BAKER FACES
PAIN_FUNCTIONAL_ASSESSMENT: 0-10

## 2025-05-12 ASSESSMENT — COLUMBIA-SUICIDE SEVERITY RATING SCALE - C-SSRS
1. IN THE PAST MONTH, HAVE YOU WISHED YOU WERE DEAD OR WISHED YOU COULD GO TO SLEEP AND NOT WAKE UP?: NO
6. HAVE YOU EVER DONE ANYTHING, STARTED TO DO ANYTHING, OR PREPARED TO DO ANYTHING TO END YOUR LIFE?: NO
2. HAVE YOU ACTUALLY HAD ANY THOUGHTS OF KILLING YOURSELF?: NO

## 2025-05-12 ASSESSMENT — PAIN SCALES - GENERAL
PAINLEVEL_OUTOF10: 6
PAINLEVEL_OUTOF10: 6
PAINLEVEL_OUTOF10: 0 - NO PAIN
PAINLEVEL_OUTOF10: 6
PAINLEVEL_OUTOF10: 0 - NO PAIN

## 2025-05-12 ASSESSMENT — PAIN DESCRIPTION - DESCRIPTORS: DESCRIPTORS: ACHING

## 2025-05-12 NOTE — DISCHARGE INSTRUCTIONS
DISCHARGE INSTRUCTIONS FOR INJECTIONS     You underwent bilateral lumbar transforaminal epidural steroid injection today    After most injections, it is recommended that you relax and limit your activity for the remainder of the day unless you have been told otherwise by your pain physician.  You should not drive a car, operate machinery, or make important legal decisions unless otherwise directed by your pain physician.  You may resume your normal activity, including exercise, tomorrow.      Keep a written pain diary of how much pain relief you experienced following the injection procedure and the length of time of pain relief you experienced pain relief. Following diagnostic injections like medial branch nerve blocks, sacroiliac joint blocks, stellate ganglion injections and other blocks, it is very important you record the specific amount of pain relief you experienced immediately after the injectionand how long it lasted. Your doctor will ask you for this information at your follow up visit.     For all injections, please keep the injection site dry and inspect the site for a couple of days. You may remove the Band-Aid the day of the injection at any time.     Some discomfort, bruising or slight swelling may occur at the injection site. This is not abnormal if it occurs.  If needed you may:    -Take over the counter medication such as Tylenol or Motrin.   -Apply an ice pack for 30 minutes, 2 to 3 times a day for the first 24 hours.     You may shower today; no soaking baths, hot tubs, whirlpools or swimming pools for two days.      If you are given steroids in your injection, it may take 3-5 days for the steroid medication to take effect. You may notice a worsening of your symptoms for 1-2 days after the injection. This is not abnormal.  You may use acetaminophen, ibuprofen, or prescription medication that your doctor may have prescribed for you if you need to do so.     A few common side effects of steroids  include facial flushing, sweating, restlessness, irritability,difficulty sleeping, increase in blood sugar, and increased blood pressure. If you have diabetes, please monitor your blood sugar at least once a day for at least 5 days. If you have poorly controlled high blood pressure, monitoryour blood pressure for at least 2 days and contact your primary care physician if these numbers are unusually high for you.      If you take aspirin or non-steroidal anti-inflammatory drugs (examples are Motrin, Advil, ibuprofen, Naprosyn, Voltaren, Relafen, etc.) you may restart these this evening, but stop taking it 3 days before your next appointment, unless instructed otherwiseby your physician.      You do not need to discontinue non-aspirin-containing pain medications prior to an injection (examples: Celebrex, tramadol, hydrocodone and acetaminophen).      If you take a blood thinning medication (Coumadin, Lovenox, Fragmin,Ticlid, Plavix, Pradaxa, etc.), please discuss this with your primary care physician/cardiologist and your pain physician. These medications MUST be discontinued before you can have an injection safely, without the risk of uncontrolled bleeding. If these medications are not discontinued for an appropriate period of time, you will not be able to receivean injection. Please adhere to instructions given to you about when to restart your blood thinning medication. If you have any questions please reach out to our team.    If you are taking Coumadin, please have your INR checked the morning of your procedure and bring the result to your appointment unless otherwise instructed. If your INR is over 1.2, your injection may need to be rescheduled to avoid uncontrolled bleeding from the needle placement.     Call [facility]  and ask for Pain Management at [phone] between 8am-4pm Monday - Friday if you are experiencing the following:    If you received an epidural or spinal injection:    -Headache that doesnot  go away with medicine, is worse when sitting or standing up, and is greatly relieved upon lying down.   -Severe pain worse than or different than your baseline pain.   -Chills or fever (101º F or greater).   -Drainage or signs of infection at the injection site     Go directly to the Emergency Department if you are experiencing the following and received an epidural or spinal injection:   -Abrupt weakness or progressive weakness in your legs that starts after you leave the clinic.   -Abrupt severe or worsening numbness in your legs.   -Inability to urinate after the injection or loss of bowel or bladder control without the urge to defecate or urinate.     If you have a clinical question that cannot wait until your next appointment, please call [phone] between 8am-4pm Monday - Friday or send a [facility] message. We do our best to return all non-emergency messages within 24 hours, Monday - Friday. A nurse or physician will return your message. You may also try calling Dr. Sam Del Valle's nurse ([phone]) and they will do their best to answer your question(s).    If you need to cancel an appointment, please call the scheduling staff at [phone] during normal business hours or leave a message at least 24 hours in advance.     If you are going to be sedated for your next procedure, you MUST have responsible adult who can legally drive accompany you home. You cannot eat or drink for at least eight hours prior to the planned procedure if you are going to receive sedation. You may take your non-blood thinning medications with a small sip of water.

## 2025-05-12 NOTE — H&P
Pain Management H&P    History Of Present Illness  Dinora Elias is a 70 y.o. female presents for procedure state below. Endorses no changes in past medical history or medical health since last seen in clinic.      Past Medical History  She has a past medical history of Acute gingivitis, plaque induced (12/18/2014), Breast cyst (1995), CAD (coronary artery disease), Encounter for screening for malignant neoplasm of colon (11/03/2014), Fibrocystic breast (1995), HLD (hyperlipidemia), HTN (hypertension), IBS (irritable bowel syndrome), Other specified disorders of teeth and supporting structures (01/04/2016), Otitis media, unspecified, right ear (01/25/2018), Personal history of other diseases of the nervous system and sense organs (05/29/2015), Personal history of other diseases of the respiratory system, Personal history of other diseases of the respiratory system (06/10/2016), Personal history of other specified conditions (09/11/2014), and Unspecified otitis externa, unspecified ear (09/26/2018).    She has no past medical history of Personal history of irradiation.    Surgical History  She has a past surgical history that includes Breast biopsy (Right, 10/01/2013) and Total vaginal hysterectomy (10/01/2013).     Social History  She reports that she quit smoking about 2 years ago. Her smoking use included cigarettes. She started smoking about 46 years ago. She has a 22 pack-year smoking history. She uses smokeless tobacco. She reports current alcohol use of about 7.0 standard drinks of alcohol per week. She reports that she does not use drugs.    Family History  Family History[1]     Allergies  Patient has no known allergies.    Review of Symptoms:   Constitutional: Negative for chills, diaphoresis or fever  HENT: Negative for neck swelling  Eyes:.  Negative for eye pain  Respiratory:.  Negative for cough, shortness of breath or wheezing    Cardiovascular:.  Negative for chest pain or  palpitations  Gastrointestinal:.  Negative for abdominal pain, nausea and vomiting  Genitourinary:.  Negative for urgency  Musculoskeletal:  Positive for back pain. Positive for joint pain. Denies falls within the past 3 months.  Skin: Negative for wounds or itching   Neurological: Negative for dizziness, seizures, loss of consciousness and weakness  Endo/Heme/Allergies: Does not bruise/bleed easily  Psychiatric/Behavioral: Negative for depression. The patient does not appear anxious.      Pre-sedation Evaluation  ASA class 2  Mallampati score 2     PHYSICAL EXAM  Vitals signs reviewed  Constitutional:       General: Not in acute distress     Appearance: Normal appearance. Not ill-appearing.  HENT:     Head: Normocephalic and atraumatic  Eyes:     Conjunctiva/sclera: Conjunctivae normal  Cardiovascular:     Rate and Rhythm: Normal rate and regular rhythm  Pulmonary:     Effort: No respiratory distress  Abdominal:     Palpations: Abdomen is soft  Musculoskeletal: PICKETT  Skin:     General: Skin is warm and dry  Neurological:     General: No focal deficit present  Psychiatric:         Mood and Affect: Mood normal         Behavior: Behavior normal     Last Recorded Vitals  There were no vitals taken for this visit.    Relevant Results  Current Outpatient Medications   Medication Instructions    amLODIPine (NORVASC) 2.5 mg, oral, Daily    atorvastatin (LIPITOR) 40 mg, oral, Nightly    cholecalciferol (VITAMIN D-3) 125 mcg, oral, Daily    fexofenadine (Allegra) 180 mg tablet 1 tablet, Daily    gabapentin (NEURONTIN) 600 mg, oral, 3 times daily    hydroCHLOROthiazide (MICROZIDE) 12.5 mg, oral, Daily    lisinopril 20 mg, oral, Daily    montelukast (SINGULAIR) 10 mg, oral, Nightly    omeprazole (PRILOSEC) 40 mg, oral, Daily    sod sulf-pot chloride-mag sulf (Sutab) 1.479-0.188- 0.225 gram tablet tablet (Kit prep take one bottle of 12 tablets by mouth at 6pm night before procedure and take second bottle of 12 tablets by mouth  at 4 am day of procedure).Take as directed    tiZANidine (ZANAFLEX) 2 mg, oral, Every 8 hours PRN    Trelegy Ellipta 100-62.5-25 mcg blister with device 1 puff, Daily         MR lumbar spine wo IV contrast 11/25/2024    Narrative  Interpreted By:  Micki Connelly,  STUDY:  MR LUMBAR SPINE WO IV CONTRAST; 11/25/2024 2:29 pm    INDICATION:  Signs/Symptoms:Lumbar claudication.    COMPARISON:  MRI of lumbar spine from 05/20/2023    ACCESSION NUMBER(S):  [id number]    ORDERING CLINICIAN:  ARMANDO FRANKS    TECHNIQUE:  Sagittal and axial T1 and T2 weighted images of the lumbar spine were  acquired. Sagittal STIR imaging was also performed    FINDINGS:  Is left convex scoliosis of the thoracolumbar spine as before.    The vertebral bodies demonstrate expected height. Grade 1  anterolisthesis of L4 over L5 seen as before. There are bilateral  pars interarticularis defects of L4 without edema as before.    Patchy marrow edema is noted within L1 and L2 vertebral bodies,  mildly increased as compared to prior study.    There is desiccation and degeneration of intervertebral discs. There  is multilevel osteophytic spurring.    The lower thoracic cord is unremarkable. The conus terminates  appropriately at L2.    At L5-S1 there is circumferential disc bulge with bilateral facet  hypertrophy. There are small bilateral facet joint effusions. There  is a synovial cyst arising from the left-sided facet joint measuring  5 x 3 mm which encroaches upon the left lateral recess. No overall  central canal stenosis. Mild right neural foraminal narrowing another  small cystic focus is noted posterior to the exiting nerve root  within the right-sided L5-S1 neural foramina. This may reflect an  additional degenerative or synovial cyst. It causes mild encroachment  upon the adjacent neural foramina.    At L4-L5 there is posterior osteophytic spurring with anterolisthesis  and bilateral facet and ligamentum flavum hypertrophy. Severe  central  canal stenosis with severe left neural foraminal narrowing. The  central canal caliber has worsened since prior study.    At L3-L4, posterior disc bulge with bilateral facet hypertrophy.  There is moderate right facet joint effusion. Mild central canal  stenosis with mild right and mild left neural foraminal narrowing.  The findings are mildly progressed since prior study.    At L2-L3, posterior disc bulge with osteophytic spurring and  bilateral facet hypertrophy. Mild central canal stenosis with mild  bilateral neural foraminal narrowing., slightly worse since prior  study.    At L1-L2, posterior disc bulge with bilateral facet hypertrophy.  Narrowing of right lateral recess with mild overall central canal  stenosis. Moderate right neural foraminal narrowing. The findings are  similar to prior study.    T12-L1, no central canal stenosis or neural foraminal narrowing.    There is a cyst within the left lobe of liver, incompletely assessed  on current exam, also seen on prior study.    Impression  Left convex scoliosis of the thoracolumbar spine as before.    Lumbar spondylosis with varying severity of central canal and neural  foraminal narrowing at multiple as detailed. Several of the findings  have worsened since prior study.    Signed by: Micki Connelly 11/26/2024 1:43 PM  Dictation workstation:   [id number]      MR lumbar spine wo IV contrast 05/24/2023    Narrative  Interpreted By:  KATT OJEDA MD  MRN: [id number]  Patient Name: JEFFREY MCKNIGHT    STUDY:  MRI L-SPINE WO;  5/24/2023 2:48 pm    INDICATION:  back pain, DDD.    COMPARISON:  Radiograph 02/07/2023.    ACCESSION NUMBER(S):  [id number]    ORDERING CLINICIAN:  ERICA URBINA    TECHNIQUE:  Sagittal T1, T2, STIR, axial T1 and T2 weighted images of the lumbar  spine were acquired.    FINDINGS:  For counting purposes the last lumbarized vertebral body is labeled  L5.    There is S shaped scoliosis of the lumbar spine. There is  levoconvexity  of the upper lumbar spine and dextro convexity of the  lower lumbar spine.    There is grade 1 anterolisthesis of L4 on L5. Alignment, vertebral  body heights and marrow signal pattern otherwise within normal  limits. Edema within the endplates at L4-L5 is likely degenerative.  There is desiccated disc signal throughout the lumbar spine with  moderate to severe disc height loss at L1-L2 and L4-L5. The conus  terminates at L1-L2 and is unremarkable.    Incompletely evaluated T2 hyperintense lesion within the liver may  represent a cyst. Evaluation of the paraspinal soft tissues is  otherwise unremarkable.    Evaluation by level:    T12-L1: No significant spinal canal or neural foraminal stenosis.    L1-L2: Right eccentric disc bulge and facet arthrosis. No significant  spinal canal stenosis. No significant left and moderate right neural  foraminal stenosis.    L2-L3: Disc bulge, facet arthrosis and ligamentum flavum thickening.  Mild spinal canal stenosis. Moderate right and mild-to-moderate left  neural foraminal stenosis.    L3-L4: Disc bulge, facet arthrosis and ligamentum flavum thickening.  Mild spinal canal stenosis. Moderate left and mild-to-moderate right  neural foraminal stenosis.    L4-L5: Disc bulge, facet arthrosis and ligamentum flavum thickening.  Severe spinal canal stenosis. Severe left and no significant right  neural foraminal stenosis.    L5-S1: No significant spinal canal or neural foraminal stenosis.    Impression  S shaped scoliosis of the lumbar spine with multilevel degenerative  disc disease and facet arthrosis. There is severe spinal canal and  severe left neural foraminal stenosis at L4-L5.      MR cervical spine wo IV contrast 05/24/2023    Narrative  Interpreted By:  KATT OJEDA MD  MRN: [id number]  Patient Name: JEFFREY MCKNIGHT    STUDY:  MRI CERVICAL WO;  5/24/2023 2:43 pm    INDICATION:  severe c-spine degenerative disease on x-ray, neck pain.    COMPARISON:  Radiograph  02/07/2023.    ACCESSION NUMBER(S):  [id number]    ORDERING CLINICIAN:  ERICA URBINA    TECHNIQUE:  Sagittal T1, T2, STIR, axial T1 and axial T2 weighted images were  acquired through the cervical spine.    FINDINGS:  Craniocervical junction is within normal limits. Cerebellar tonsils  are above the foramen magnum. There is straightening of the normal  cervical lordosis. There is trace anterolisthesis of C3 on C4 and C4  on C5. Alignment, vertebral body heights and marrow signal pattern  are otherwise within normal limits. There is desiccated disc signal  throughout the cervical spine with moderate disc height loss  degenerative endplate changes at C5-C6 and C6-C7. Cervical cord is  unremarkable. Prevertebral soft tissues are not thickened.    Evaluation by level:    C1-C2: No significant spinal canal stenosis    C2-C3: Mild disc osteophyte complex, uncovertebral joint hypertrophy  and facet arthrosis. No significant spinal canal stenosis. Mild left  and no significant right neural foraminal stenosis    C3-C4: Mild left eccentric disc osteophyte complex, uncovertebral  joint hypertrophy and facet arthrosis. Mild spinal canal stenosis.  Moderate left and mild right neural foraminal stenosis    C4-C5: Disc osteophyte complex, uncovertebral joint hypertrophy and  facet arthrosis. Moderate spinal canal stenosis. Moderate to severe  right and no significant left neural foraminal stenosis    C5-C6: Disc osteophyte complex, uncovertebral joint hypertrophy and  facet arthrosis. Moderate spinal canal and neural foraminal stenosis.    C6-C7: Disc osteophyte complex, uncovertebral joint hypertrophy and  facet arthrosis. Mild spinal canal stenosis. Moderate left and  mild-to-moderate right neural foraminal stenosis    C7-T1: No significant spinal canal or neural foraminal stenosis.    Impression  Multilevel degenerative disc disease and facet arthrosis with  moderate spinal canal stenosis at C4-C5, and C5-C6. There is  moderate  to severe right neural foraminal stenosis at C4-C5 and moderate  neural foraminal stenosis at C3-C4 on the left, C5-C6 and C6-C7  bilaterally.       ASSESSMENT/PLAN  Dinora Elias is a 70 y.o. female here for bilateral L4 TFESI under fluoroscopy    Patient denies any recent antibiotic use or infections, denies any blood thinner use, and denies contrast or local anesthetic allergies     Risks, benefits, alternatives discussed. All questions answered to the best of my ability. Patient agrees to proceed.      Our plan is as follows:  - Proceed with aforementioned procedure          Gen Sears DO   Pain fellow          [1]   Family History  Problem Relation Name Age of Onset    Lung cancer Mother      Other (cerebromeningeal hemmorrhage) Father      Breast cancer Sister  64

## 2025-05-27 ENCOUNTER — OFFICE VISIT (OUTPATIENT)
Dept: PAIN MEDICINE | Facility: HOSPITAL | Age: 70
End: 2025-05-27
Payer: MEDICARE

## 2025-05-27 ENCOUNTER — HOSPITAL ENCOUNTER (OUTPATIENT)
Dept: RADIOLOGY | Facility: HOSPITAL | Age: 70
Discharge: HOME | End: 2025-05-27
Payer: MEDICARE

## 2025-05-27 DIAGNOSIS — G89.29 CHRONIC THORACIC BACK PAIN, UNSPECIFIED BACK PAIN LATERALITY: ICD-10-CM

## 2025-05-27 DIAGNOSIS — M54.6 CHRONIC THORACIC BACK PAIN, UNSPECIFIED BACK PAIN LATERALITY: ICD-10-CM

## 2025-05-27 PROCEDURE — 99214 OFFICE O/P EST MOD 30 MIN: CPT | Performed by: ANESTHESIOLOGY

## 2025-05-27 PROCEDURE — 1159F MED LIST DOCD IN RCRD: CPT | Performed by: ANESTHESIOLOGY

## 2025-05-27 PROCEDURE — 1125F AMNT PAIN NOTED PAIN PRSNT: CPT | Performed by: ANESTHESIOLOGY

## 2025-05-27 PROCEDURE — 72072 X-RAY EXAM THORAC SPINE 3VWS: CPT

## 2025-05-27 PROCEDURE — 72072 X-RAY EXAM THORAC SPINE 3VWS: CPT | Performed by: RADIOLOGY

## 2025-05-27 RX ORDER — TRAMADOL HYDROCHLORIDE 50 MG/1
50 TABLET, FILM COATED ORAL 3 TIMES DAILY PRN
Qty: 21 TABLET | Refills: 0 | Status: SHIPPED | OUTPATIENT
Start: 2025-05-27 | End: 2025-06-03

## 2025-05-27 ASSESSMENT — PAIN SCALES - GENERAL: PAINLEVEL_OUTOF10: 8

## 2025-05-27 NOTE — PROGRESS NOTES
Subjective   Patient ID: Dinora Elias is a 70 y.o. female with a past medical history of scoliosis, thoracic back pain, chronic low back pain, who presents today for follow-up for her thoracic back pain.  Patient states that the injection she received 4/4/2025 provided her with greater than 75% pain relief until about a week ago when she noticed worsening pain.  She stated that she has been mowing her lawn on her lawnmower with bumps in the way, and pain started shortly after that.  It wraps around her rib cage like a vice .  It is worse with prolonged standing and walking, as well as with pressure to the back.  Pain is better with sitting and with laying flat. Nothing seems to help her pain, and it seems to have improved on its own today.  The pain causes significant stress in the patient's life, interfering with general activity, mood, ability to walk distances, ability to perform tasks at home and/or work. Patient participates in physical therapy, and continues to perform physician directed exercises at home. Patient denies any bowel or bladder incontinence, saddle anesthesia, weaknesses, or falls.      Review of Systems   13-point ROS done and negative except for HPI.     Current Outpatient Medications   Medication Instructions    amLODIPine (NORVASC) 2.5 mg, oral, Daily    atorvastatin (LIPITOR) 40 mg, oral, Nightly    cholecalciferol (VITAMIN D-3) 125 mcg, oral, Daily    fexofenadine (Allegra) 180 mg tablet 1 tablet, Daily    gabapentin (NEURONTIN) 600 mg, oral, 3 times daily    hydroCHLOROthiazide (MICROZIDE) 12.5 mg, oral, Daily    lisinopril 20 mg, oral, Daily    montelukast (SINGULAIR) 10 mg, oral, Nightly    omeprazole (PRILOSEC) 40 mg, oral, Daily    sod sulf-pot chloride-mag sulf (Sutab) 1.479-0.188- 0.225 gram tablet tablet (Kit prep take one bottle of 12 tablets by mouth at 6pm night before procedure and take second bottle of 12 tablets by mouth at 4 am day of procedure).Take as directed     tiZANidine (ZANAFLEX) 2 mg, oral, Every 8 hours PRN    traMADol (ULTRAM) 50 mg, oral, 3 times daily PRN, Use sparingly    Trelegy Ellipta 100-62.5-25 mcg blister with device 1 puff, Daily       Medical History[1]     Surgical History[2]     Family History[3]     RX Allergies[4]     Objective     There were no vitals filed for this visit.     Physical Exam  General: NAD, well groomed, well nourished  Eyes: Non-icteric sclera, EOMI  Ears, Nose, Mouth, and Throat: External ears and nose appear to be without deformity or rash. No lesions or masses noted. Hearing is grossly intact.   Neck: Supple, trachea midline, no appreciable lumps or lymph nodes  Respiratory: Nonlabored breathing   Cardiovascular: No peripheral edema observed  Skin: No rashes or open lesions/ulcers identified on skin.  Psychiatric: Alert, orientation to person, place, and time. Cooperative.    Neurologic:   Cranial nerves grossly intact.   Strength: 5/5 and symmetric plantar/dorsiflexion   Sensation: Normal to light touch throughout; pinprick intact throughout.   DTRs:normal and symmetric throughout  Hurtado: absent  Clonus: absent    Back:   Palpation: Tenderness to palpation over thoracic spine, but not paraspinous muscles.     Imaging personally reviewed and independently interpreted:   MR lumbar spine wo IV contrast 11/25/2024    Narrative  Interpreted By:  Micki Connelly,  STUDY:  MR LUMBAR SPINE WO IV CONTRAST; 11/25/2024 2:29 pm    INDICATION:  Signs/Symptoms:Lumbar claudication.    COMPARISON:  MRI of lumbar spine from 05/20/2023    ACCESSION NUMBER(S):  [id number]    ORDERING CLINICIAN:  ARMANDO FRANKS    TECHNIQUE:  Sagittal and axial T1 and T2 weighted images of the lumbar spine were  acquired. Sagittal STIR imaging was also performed    FINDINGS:  Is left convex scoliosis of the thoracolumbar spine as before.    The vertebral bodies demonstrate expected height. Grade 1  anterolisthesis of L4 over L5 seen as before. There are  bilateral  pars interarticularis defects of L4 without edema as before.    Patchy marrow edema is noted within L1 and L2 vertebral bodies,  mildly increased as compared to prior study.    There is desiccation and degeneration of intervertebral discs. There  is multilevel osteophytic spurring.    The lower thoracic cord is unremarkable. The conus terminates  appropriately at L2.    At L5-S1 there is circumferential disc bulge with bilateral facet  hypertrophy. There are small bilateral facet joint effusions. There  is a synovial cyst arising from the left-sided facet joint measuring  5 x 3 mm which encroaches upon the left lateral recess. No overall  central canal stenosis. Mild right neural foraminal narrowing another  small cystic focus is noted posterior to the exiting nerve root  within the right-sided L5-S1 neural foramina. This may reflect an  additional degenerative or synovial cyst. It causes mild encroachment  upon the adjacent neural foramina.    At L4-L5 there is posterior osteophytic spurring with anterolisthesis  and bilateral facet and ligamentum flavum hypertrophy. Severe central  canal stenosis with severe left neural foraminal narrowing. The  central canal caliber has worsened since prior study.    At L3-L4, posterior disc bulge with bilateral facet hypertrophy.  There is moderate right facet joint effusion. Mild central canal  stenosis with mild right and mild left neural foraminal narrowing.  The findings are mildly progressed since prior study.    At L2-L3, posterior disc bulge with osteophytic spurring and  bilateral facet hypertrophy. Mild central canal stenosis with mild  bilateral neural foraminal narrowing., slightly worse since prior  study.    At L1-L2, posterior disc bulge with bilateral facet hypertrophy.  Narrowing of right lateral recess with mild overall central canal  stenosis. Moderate right neural foraminal narrowing. The findings are  similar to prior study.    T12-L1, no central  canal stenosis or neural foraminal narrowing.    There is a cyst within the left lobe of liver, incompletely assessed  on current exam, also seen on prior study.    Impression  Left convex scoliosis of the thoracolumbar spine as before.    Lumbar spondylosis with varying severity of central canal and neural  foraminal narrowing at multiple as detailed. Several of the findings  have worsened since prior study.    Signed by: Micki Connelly 11/26/2024 1:43 PM  Dictation workstation:   [id number]      MR lumbar spine wo IV contrast 05/24/2023    Narrative  Interpreted By:  KATT OJEDA MD  MRN: [id number]  Patient Name: JEFFREY MCKNIGHT    STUDY:  MRI L-SPINE WO;  5/24/2023 2:48 pm    INDICATION:  back pain, DDD.    COMPARISON:  Radiograph 02/07/2023.    ACCESSION NUMBER(S):  [id number]    ORDERING CLINICIAN:  ERICA URBINA    TECHNIQUE:  Sagittal T1, T2, STIR, axial T1 and T2 weighted images of the lumbar  spine were acquired.    FINDINGS:  For counting purposes the last lumbarized vertebral body is labeled  L5.    There is S shaped scoliosis of the lumbar spine. There is  levoconvexity of the upper lumbar spine and dextro convexity of the  lower lumbar spine.    There is grade 1 anterolisthesis of L4 on L5. Alignment, vertebral  body heights and marrow signal pattern otherwise within normal  limits. Edema within the endplates at L4-L5 is likely degenerative.  There is desiccated disc signal throughout the lumbar spine with  moderate to severe disc height loss at L1-L2 and L4-L5. The conus  terminates at L1-L2 and is unremarkable.    Incompletely evaluated T2 hyperintense lesion within the liver may  represent a cyst. Evaluation of the paraspinal soft tissues is  otherwise unremarkable.    Evaluation by level:    T12-L1: No significant spinal canal or neural foraminal stenosis.    L1-L2: Right eccentric disc bulge and facet arthrosis. No significant  spinal canal stenosis. No significant left and moderate right  neural  foraminal stenosis.    L2-L3: Disc bulge, facet arthrosis and ligamentum flavum thickening.  Mild spinal canal stenosis. Moderate right and mild-to-moderate left  neural foraminal stenosis.    L3-L4: Disc bulge, facet arthrosis and ligamentum flavum thickening.  Mild spinal canal stenosis. Moderate left and mild-to-moderate right  neural foraminal stenosis.    L4-L5: Disc bulge, facet arthrosis and ligamentum flavum thickening.  Severe spinal canal stenosis. Severe left and no significant right  neural foraminal stenosis.    L5-S1: No significant spinal canal or neural foraminal stenosis.    Impression  S shaped scoliosis of the lumbar spine with multilevel degenerative  disc disease and facet arthrosis. There is severe spinal canal and  severe left neural foraminal stenosis at L4-L5.      MR cervical spine wo IV contrast 05/24/2023    Narrative  Interpreted By:  KATT OJEDA MD  MRN: [id number]  Patient Name: JEFFREY MCKNIGHT    STUDY:  MRI CERVICAL WO;  5/24/2023 2:43 pm    INDICATION:  severe c-spine degenerative disease on x-ray, neck pain.    COMPARISON:  Radiograph 02/07/2023.    ACCESSION NUMBER(S):  [id number]    ORDERING CLINICIAN:  ERICA URBINA    TECHNIQUE:  Sagittal T1, T2, STIR, axial T1 and axial T2 weighted images were  acquired through the cervical spine.    FINDINGS:  Craniocervical junction is within normal limits. Cerebellar tonsils  are above the foramen magnum. There is straightening of the normal  cervical lordosis. There is trace anterolisthesis of C3 on C4 and C4  on C5. Alignment, vertebral body heights and marrow signal pattern  are otherwise within normal limits. There is desiccated disc signal  throughout the cervical spine with moderate disc height loss  degenerative endplate changes at C5-C6 and C6-C7. Cervical cord is  unremarkable. Prevertebral soft tissues are not thickened.    Evaluation by level:    C1-C2: No significant spinal canal stenosis    C2-C3: Mild disc  osteophyte complex, uncovertebral joint hypertrophy  and facet arthrosis. No significant spinal canal stenosis. Mild left  and no significant right neural foraminal stenosis    C3-C4: Mild left eccentric disc osteophyte complex, uncovertebral  joint hypertrophy and facet arthrosis. Mild spinal canal stenosis.  Moderate left and mild right neural foraminal stenosis    C4-C5: Disc osteophyte complex, uncovertebral joint hypertrophy and  facet arthrosis. Moderate spinal canal stenosis. Moderate to severe  right and no significant left neural foraminal stenosis    C5-C6: Disc osteophyte complex, uncovertebral joint hypertrophy and  facet arthrosis. Moderate spinal canal and neural foraminal stenosis.    C6-C7: Disc osteophyte complex, uncovertebral joint hypertrophy and  facet arthrosis. Mild spinal canal stenosis. Moderate left and  mild-to-moderate right neural foraminal stenosis    C7-T1: No significant spinal canal or neural foraminal stenosis.    Impression  Multilevel degenerative disc disease and facet arthrosis with  moderate spinal canal stenosis at C4-C5, and C5-C6. There is moderate  to severe right neural foraminal stenosis at C4-C5 and moderate  neural foraminal stenosis at C3-C4 on the left, C5-C6 and C6-C7  bilaterally.     Transforaminal  Table formatting from the original result was not included.  Procedure  Transforaminal    Indication  Lumbar radiculopathy    Medications  midazolam (Versed) injection 2 mg   lidocaine PF (Xylocaine) 5 mg/mL (0.5 %) injection 8 mL   dexAMETHasone (PF) (Decadron) injection 10 mg   iohexol (OMNIPaque) 240 mg iodine/mL solution 3 mL   (Totals for administrations occurring from 1229 to 1242 on 05/12/25)     Preprocedure  A history and physical has been performed, and patient medication   allergies have been reviewed. The patient's tolerance of previous   anesthesia has been reviewed. The risks and benefits of the procedure and   the sedation options and risks were discussed  with the patient. All   questions were answered and informed consent obtained.    Details of the Procedure  Preoperative diagnosis:  Lumbar radiculitis, stenosis  Postoperative diagnosis:  Lumbar radiculitis, stenosis  Procedure: Bilateral L4 transforaminal epidural steroid injection under   fluoroscopic guidance  Surgeon: Cheyanne Vargas  Assistant:  FellowOrion  Anesthesia: Local plus IV sedation  Complications: Apparently none    Clinical note: Dinora is a 70-year-old female with a history of back and   leg symptoms and known spinal stenosis who presents for repeat epidural.    Procedure note: Patient met in the preoperative holding area.  Patient   brought back to the procedure room and placed in the prone position on the   fluoroscopy table. Area over the bilateral low back was exposed, prepped,   draped, in the usual sterile fashion.  Skin and subcutaneous tissues to   the bilateral L4 neuroforamen was anesthetized using 0.5% lidocaine.  90   mm 22-gauge Sprotte needles were inserted in the skin and advanced into   the foramen at L4 bilaterally.  Based on prior procedures, subpedicular   approach taken on the left side particularly.  Bilateral needle tip   position was confirmed in AP, oblique and lateral view.  Contrast was   injected which showed appropriate epidural spread, no intravascular or   intrathecal uptake. A total of 3 mL of 0.5% lidocaine mixed with 10 mg   dexamethasone was injected in divided doses among the 2 needles. Needles   removed, bandage applied, patient tolerated the procedure well with no   immediate complications.    Procedure Provider  Cheyanne Vargas MD    Procedure Location  [facility]  [address] [id number] [phone]    Referring Provider  Cheyanne Vargas MD  [id number]  Department Of Anesthesiology [place] [address]     1. Chronic thoracic back pain, unspecified back pain  laterality  XR thoracic spine 3 views    traMADol (Ultram) 50 mg tablet           Assessment/Plan   Dinora Elias is a 70 y.o. female with a past medical history of scoliosis, thoracic back pain, chronic low back pain, who presents today for follow-up for her thoracic back pain.  Patient states that the injection she received 4/4/2025 provided her with greater than 75% pain relief for about 6 weeks, but returned after she had been hitting bumps while mowing her lawn.  Given her history of osteoporosis, she may have thoracic vertebral body fracture.  We could consider repeat thoracic epidural steroid injections in the absence of any contraindications in the future.    Plan:  -We will order x-ray thoracic spine.  Pending the results, will consider MRI thoracic spine, as her last picture was from October 2023.      Follow up: After X-rays    The patient was invited to contact us back anytime with any questions or concerns and follow-up with us in the office as needed.     Diagnoses and all orders for this visit:  Chronic thoracic back pain, unspecified back pain laterality  -     XR thoracic spine 3 views; Future  -     traMADol (Ultram) 50 mg tablet; Take 1 tablet (50 mg) by mouth 3 times a day as needed for severe pain (7 - 10) for up to 7 days. Use sparingly      This note was generated with the aid of dictation software, there may be typos despite my attempts at proofreading.     Daphne Ellsworth MD  Interventional Pain Medicine Fellow  [facility]        [1]   Past Medical History:  Diagnosis Date    Acute gingivitis, plaque induced 12/18/2014    Acute gingivitis    Breast cyst 1995    CAD (coronary artery disease)     Encounter for screening for malignant neoplasm of colon 11/03/2014    Encounter for screening for malignant neoplasm of colon    Fibrocystic breast 1995    HLD (hyperlipidemia)     HTN (hypertension)     IBS (irritable bowel syndrome)     Other specified  disorders of teeth and supporting structures 01/04/2016    Tooth pain    Otitis media, unspecified, right ear 01/25/2018    Acute right otitis media    Personal history of other diseases of the nervous system and sense organs 05/29/2015    History of acute otitis media    Personal history of other diseases of the respiratory system     History of chronic obstructive lung disease    Personal history of other diseases of the respiratory system 06/10/2016    History of sinusitis    Personal history of other specified conditions 09/11/2014    History of abdominal pain    Unspecified otitis externa, unspecified ear 09/26/2018    Otitis externa   [2]   Past Surgical History:  Procedure Laterality Date    BREAST BIOPSY Right 10/01/2013    benign    TOTAL VAGINAL HYSTERECTOMY  10/01/2013    Vaginal Hysterectomy   [3]   Family History  Problem Relation Name Age of Onset    Lung cancer Mother      Other (cerebromeningeal hemmorrhage) Father      Breast cancer Sister  64   [4] No Known Allergies

## 2025-05-28 ENCOUNTER — APPOINTMENT (OUTPATIENT)
Dept: RADIOLOGY | Facility: HOSPITAL | Age: 70
End: 2025-05-28
Payer: MEDICARE

## 2025-05-30 ENCOUNTER — APPOINTMENT (OUTPATIENT)
Dept: RADIOLOGY | Facility: HOSPITAL | Age: 70
End: 2025-05-30
Payer: MEDICARE

## 2025-06-02 ENCOUNTER — HOSPITAL ENCOUNTER (OUTPATIENT)
Dept: RADIOLOGY | Facility: HOSPITAL | Age: 70
Discharge: HOME | End: 2025-06-02
Payer: MEDICARE

## 2025-06-02 DIAGNOSIS — M85.80 LOW BONE MASS: ICD-10-CM

## 2025-06-02 DIAGNOSIS — M81.6 LOCALIZED OSTEOPOROSIS (LEQUESNE): ICD-10-CM

## 2025-06-02 PROCEDURE — 77080 DXA BONE DENSITY AXIAL: CPT

## 2025-06-02 PROCEDURE — 77080 DXA BONE DENSITY AXIAL: CPT | Performed by: RADIOLOGY

## 2025-06-03 ENCOUNTER — APPOINTMENT (OUTPATIENT)
Dept: RADIOLOGY | Facility: HOSPITAL | Age: 70
End: 2025-06-03
Payer: MEDICARE

## 2025-06-03 DIAGNOSIS — M54.2 CERVICALGIA: ICD-10-CM

## 2025-06-03 DIAGNOSIS — I10 ESSENTIAL HYPERTENSION: ICD-10-CM

## 2025-06-03 DIAGNOSIS — M54.14 THORACIC RADICULOPATHY: ICD-10-CM

## 2025-06-03 DIAGNOSIS — J30.9 ALLERGIC RHINITIS, UNSPECIFIED SEASONALITY, UNSPECIFIED TRIGGER: ICD-10-CM

## 2025-06-03 RX ORDER — TIZANIDINE 2 MG/1
2 TABLET ORAL EVERY 8 HOURS PRN
Qty: 90 TABLET | Refills: 0 | Status: SHIPPED | OUTPATIENT
Start: 2025-06-03

## 2025-06-03 RX ORDER — LISINOPRIL 20 MG/1
20 TABLET ORAL DAILY
Qty: 90 TABLET | Refills: 1 | Status: SHIPPED | OUTPATIENT
Start: 2025-06-03

## 2025-06-03 RX ORDER — MONTELUKAST SODIUM 10 MG/1
10 TABLET ORAL NIGHTLY
Qty: 90 TABLET | Refills: 1 | Status: SHIPPED | OUTPATIENT
Start: 2025-06-03

## 2025-06-03 RX ORDER — AMLODIPINE BESYLATE 2.5 MG/1
2.5 TABLET ORAL DAILY
Qty: 90 TABLET | Refills: 1 | Status: SHIPPED | OUTPATIENT
Start: 2025-06-03

## 2025-06-06 ENCOUNTER — APPOINTMENT (OUTPATIENT)
Dept: PRIMARY CARE | Facility: CLINIC | Age: 70
End: 2025-06-06
Payer: MEDICARE

## 2025-06-06 VITALS
TEMPERATURE: 94.3 F | HEART RATE: 69 BPM | OXYGEN SATURATION: 95 % | WEIGHT: 97.6 LBS | BODY MASS INDEX: 19.14 KG/M2 | DIASTOLIC BLOOD PRESSURE: 76 MMHG | SYSTOLIC BLOOD PRESSURE: 116 MMHG

## 2025-06-06 DIAGNOSIS — G89.29 CHRONIC BILATERAL THORACIC BACK PAIN: ICD-10-CM

## 2025-06-06 DIAGNOSIS — R29.818 SUSPECTED SLEEP APNEA: ICD-10-CM

## 2025-06-06 DIAGNOSIS — R13.19 ESOPHAGEAL DYSPHAGIA: Primary | ICD-10-CM

## 2025-06-06 DIAGNOSIS — M54.6 CHRONIC BILATERAL THORACIC BACK PAIN: ICD-10-CM

## 2025-06-06 PROCEDURE — 99214 OFFICE O/P EST MOD 30 MIN: CPT

## 2025-06-06 PROCEDURE — 3074F SYST BP LT 130 MM HG: CPT

## 2025-06-06 PROCEDURE — 3078F DIAST BP <80 MM HG: CPT

## 2025-06-06 PROCEDURE — 1159F MED LIST DOCD IN RCRD: CPT

## 2025-06-06 NOTE — PROGRESS NOTES
Subjective   Patient ID: Dinora Elias is a 70 y.o. female who presents for Dry Mouth (Her daughter thinks she has sleep apnea, also she want to talk about the pain on her back).  HPI3    Continue dysphagia  Esophageal phase, she is scheduled for colonoscopy and EGD for swallowing issue may need dilation defer to general surgery team    Alendronate was stopped she was on prolia 2023  She continues to take Prolia, unchanged DEXA scan    Thoracic back pain  She is seeing chiropractor and massage therapist soon, referral was placed    Sleep apnea concern  States that overnight [place] daughter was sleeping in the house she noticed that patient was snoring loudly with periods of apnea, sleep study ordered    Vitals:    06/06/25 1450   BP: 116/76   Pulse: 69   Temp: 34.6 °C (94.3 °F)   SpO2: 95%       Review of Systems    Objective   Physical Exam    Assessment/Plan   Problem List Items Addressed This Visit       Thoracic back pain    Relevant Orders    Referral to Chiropractic Medicine - (External)     Other Visit Diagnoses         Esophageal dysphagia    -  Primary    Relevant Orders    Esophagogastroduodenoscopy (EGD)      Suspected sleep apnea        Relevant Orders    In-Center Sleep Study (Non-Sleep Provider)                 Thank you for coming in today, please call my office if you have any concerns or questions.     Jamie VITAL, CNP

## 2025-06-06 NOTE — PATIENT INSTRUCTIONS
Continue prolia injections for osteoporosis  We will follow this in 2 years with bone density    EGD to evaluate swallow issue  Sleep study for sleep apnea.    Chiro referral.    Thank you for coming in today, if any questions or concerns arise, please call my office.   ZAHRAA Anaya-CNP

## 2025-06-09 DIAGNOSIS — K21.00 GASTROESOPHAGEAL REFLUX DISEASE WITH ESOPHAGITIS, UNSPECIFIED WHETHER HEMORRHAGE: ICD-10-CM

## 2025-06-09 DIAGNOSIS — M54.6 CHRONIC THORACIC BACK PAIN, UNSPECIFIED BACK PAIN LATERALITY: ICD-10-CM

## 2025-06-09 DIAGNOSIS — G89.29 CHRONIC THORACIC BACK PAIN, UNSPECIFIED BACK PAIN LATERALITY: ICD-10-CM

## 2025-06-09 DIAGNOSIS — Z12.11 SCREEN FOR COLON CANCER: ICD-10-CM

## 2025-06-09 RX ORDER — TRAMADOL HYDROCHLORIDE 50 MG/1
50 TABLET, FILM COATED ORAL 3 TIMES DAILY PRN
Qty: 21 TABLET | Refills: 0 | OUTPATIENT
Start: 2025-06-09 | End: 2025-06-16

## 2025-06-11 ENCOUNTER — TELEPHONE (OUTPATIENT)
Dept: PRIMARY CARE | Facility: CLINIC | Age: 70
End: 2025-06-11
Payer: MEDICARE

## 2025-06-11 DIAGNOSIS — G89.29 CHRONIC BILATERAL THORACIC BACK PAIN: Primary | ICD-10-CM

## 2025-06-11 DIAGNOSIS — M54.6 CHRONIC BILATERAL THORACIC BACK PAIN: Primary | ICD-10-CM

## 2025-06-11 RX ORDER — TRAMADOL HYDROCHLORIDE 50 MG/1
50 TABLET, FILM COATED ORAL EVERY 8 HOURS PRN
Qty: 21 TABLET | Refills: 0 | Status: SHIPPED | OUTPATIENT
Start: 2025-06-11 | End: 2025-06-18

## 2025-06-11 NOTE — TELEPHONE ENCOUNTER
Requesting a refill on her Tramadol 50mg stating her pain management Dr is out until Friday. She uses [facility]

## 2025-06-14 ENCOUNTER — CLINICAL SUPPORT (OUTPATIENT)
Dept: EMERGENCY MEDICINE | Facility: HOSPITAL | Age: 70
End: 2025-06-14
Payer: MEDICARE

## 2025-06-14 LAB
ATRIAL RATE: 76 BPM
P AXIS: 81 DEGREES
P OFFSET: 197 MS
P ONSET: 145 MS
PR INTERVAL: 154 MS
Q ONSET: 222 MS
QRS COUNT: 12 BEATS
QRS DURATION: 82 MS
QT INTERVAL: 404 MS
QTC CALCULATION(BAZETT): 454 MS
QTC FREDERICIA: 436 MS
R AXIS: 61 DEGREES
T AXIS: 73 DEGREES
T OFFSET: 424 MS
VENTRICULAR RATE: 76 BPM

## 2025-06-14 PROCEDURE — 99285 EMERGENCY DEPT VISIT HI MDM: CPT | Performed by: EMERGENCY MEDICINE

## 2025-06-14 PROCEDURE — 93010 ELECTROCARDIOGRAM REPORT: CPT

## 2025-06-14 PROCEDURE — 93005 ELECTROCARDIOGRAM TRACING: CPT

## 2025-06-14 ASSESSMENT — PAIN DESCRIPTION - PAIN TYPE: TYPE: ACUTE PAIN;CHRONIC PAIN

## 2025-06-14 ASSESSMENT — PAIN DESCRIPTION - ORIENTATION: ORIENTATION: MID;LOWER

## 2025-06-14 ASSESSMENT — PAIN DESCRIPTION - LOCATION: LOCATION: BACK

## 2025-06-14 ASSESSMENT — PAIN SCALES - GENERAL: PAINLEVEL_OUTOF10: 7

## 2025-06-14 ASSESSMENT — PAIN - FUNCTIONAL ASSESSMENT: PAIN_FUNCTIONAL_ASSESSMENT: 0-10

## 2025-06-14 ASSESSMENT — PAIN DESCRIPTION - DESCRIPTORS: DESCRIPTORS: ACHING

## 2025-06-15 ENCOUNTER — HOSPITAL ENCOUNTER (EMERGENCY)
Facility: HOSPITAL | Age: 70
Discharge: HOME | End: 2025-06-15
Attending: EMERGENCY MEDICINE
Payer: MEDICARE

## 2025-06-15 ENCOUNTER — APPOINTMENT (OUTPATIENT)
Dept: RADIOLOGY | Facility: HOSPITAL | Age: 70
End: 2025-06-15
Payer: MEDICARE

## 2025-06-15 VITALS
BODY MASS INDEX: 17.94 KG/M2 | DIASTOLIC BLOOD PRESSURE: 81 MMHG | TEMPERATURE: 98.4 F | OXYGEN SATURATION: 96 % | WEIGHT: 95 LBS | HEART RATE: 74 BPM | HEIGHT: 61 IN | RESPIRATION RATE: 16 BRPM | SYSTOLIC BLOOD PRESSURE: 152 MMHG

## 2025-06-15 DIAGNOSIS — R11.2 NAUSEA AND VOMITING, UNSPECIFIED VOMITING TYPE: Primary | ICD-10-CM

## 2025-06-15 DIAGNOSIS — R53.83 FATIGUE, UNSPECIFIED TYPE: ICD-10-CM

## 2025-06-15 LAB
ALBUMIN SERPL BCP-MCNC: 4.4 G/DL (ref 3.4–5)
ALP SERPL-CCNC: 64 U/L (ref 33–136)
ALT SERPL W P-5'-P-CCNC: 16 U/L (ref 7–45)
ANION GAP SERPL CALC-SCNC: 15 MMOL/L (ref 10–20)
APPEARANCE UR: CLEAR
AST SERPL W P-5'-P-CCNC: 22 U/L (ref 9–39)
BASOPHILS # BLD AUTO: 0.02 X10*3/UL (ref 0–0.1)
BASOPHILS NFR BLD AUTO: 0.3 %
BILIRUB SERPL-MCNC: 1 MG/DL (ref 0–1.2)
BILIRUB UR STRIP.AUTO-MCNC: NEGATIVE MG/DL
BUN SERPL-MCNC: 14 MG/DL (ref 6–23)
CALCIUM SERPL-MCNC: 9.7 MG/DL (ref 8.6–10.6)
CARDIAC TROPONIN I PNL SERPL HS: 4 NG/L (ref 0–34)
CHLORIDE SERPL-SCNC: 93 MMOL/L (ref 98–107)
CO2 SERPL-SCNC: 29 MMOL/L (ref 21–32)
COLOR UR: YELLOW
CREAT SERPL-MCNC: 0.43 MG/DL (ref 0.5–1.05)
EGFRCR SERPLBLD CKD-EPI 2021: >90 ML/MIN/1.73M*2
EOSINOPHIL # BLD AUTO: 0.01 X10*3/UL (ref 0–0.4)
EOSINOPHIL NFR BLD AUTO: 0.2 %
ERYTHROCYTE [DISTWIDTH] IN BLOOD BY AUTOMATED COUNT: 12.6 % (ref 11.5–14.5)
GLUCOSE SERPL-MCNC: 94 MG/DL (ref 74–99)
GLUCOSE UR STRIP.AUTO-MCNC: NORMAL MG/DL
HCT VFR BLD AUTO: 37.9 % (ref 36–46)
HGB BLD-MCNC: 13.1 G/DL (ref 12–16)
HOLD SPECIMEN: NORMAL
IMM GRANULOCYTES # BLD AUTO: 0.03 X10*3/UL (ref 0–0.5)
IMM GRANULOCYTES NFR BLD AUTO: 0.5 % (ref 0–0.9)
KETONES UR STRIP.AUTO-MCNC: ABNORMAL MG/DL
LEUKOCYTE ESTERASE UR QL STRIP.AUTO: ABNORMAL
LYMPHOCYTES # BLD AUTO: 0.48 X10*3/UL (ref 0.8–3)
LYMPHOCYTES NFR BLD AUTO: 8.1 %
MAGNESIUM SERPL-MCNC: 1.96 MG/DL (ref 1.6–2.4)
MCH RBC QN AUTO: 33.7 PG (ref 26–34)
MCHC RBC AUTO-ENTMCNC: 34.6 G/DL (ref 32–36)
MCV RBC AUTO: 97 FL (ref 80–100)
MONOCYTES # BLD AUTO: 0.65 X10*3/UL (ref 0.05–0.8)
MONOCYTES NFR BLD AUTO: 10.9 %
MUCOUS THREADS #/AREA URNS AUTO: ABNORMAL /LPF
NEUTROPHILS # BLD AUTO: 4.76 X10*3/UL (ref 1.6–5.5)
NEUTROPHILS NFR BLD AUTO: 80 %
NITRITE UR QL STRIP.AUTO: NEGATIVE
NRBC BLD-RTO: 0 /100 WBCS (ref 0–0)
PH UR STRIP.AUTO: 6.5 [PH]
PHOSPHATE SERPL-MCNC: 3.2 MG/DL (ref 2.5–4.9)
PLATELET # BLD AUTO: 392 X10*3/UL (ref 150–450)
POTASSIUM SERPL-SCNC: 3.7 MMOL/L (ref 3.5–5.3)
PROT SERPL-MCNC: 6.7 G/DL (ref 6.4–8.2)
PROT UR STRIP.AUTO-MCNC: ABNORMAL MG/DL
RBC # BLD AUTO: 3.89 X10*6/UL (ref 4–5.2)
RBC # UR STRIP.AUTO: ABNORMAL MG/DL
RBC #/AREA URNS AUTO: >20 /HPF
SODIUM SERPL-SCNC: 133 MMOL/L (ref 136–145)
SP GR UR STRIP.AUTO: 1.03
SQUAMOUS #/AREA URNS AUTO: ABNORMAL /HPF
TSH SERPL-ACNC: 0.68 MIU/L (ref 0.44–3.98)
UROBILINOGEN UR STRIP.AUTO-MCNC: NORMAL MG/DL
WBC # BLD AUTO: 6 X10*3/UL (ref 4.4–11.3)
WBC #/AREA URNS AUTO: ABNORMAL /HPF

## 2025-06-15 PROCEDURE — 84443 ASSAY THYROID STIM HORMONE: CPT | Performed by: EMERGENCY MEDICINE

## 2025-06-15 PROCEDURE — 36415 COLL VENOUS BLD VENIPUNCTURE: CPT | Performed by: EMERGENCY MEDICINE

## 2025-06-15 PROCEDURE — 85025 COMPLETE CBC W/AUTO DIFF WBC: CPT | Performed by: EMERGENCY MEDICINE

## 2025-06-15 PROCEDURE — 81001 URINALYSIS AUTO W/SCOPE: CPT | Performed by: EMERGENCY MEDICINE

## 2025-06-15 PROCEDURE — 2500000004 HC RX 250 GENERAL PHARMACY W/ HCPCS (ALT 636 FOR OP/ED): Performed by: EMERGENCY MEDICINE

## 2025-06-15 PROCEDURE — 71260 CT THORAX DX C+: CPT

## 2025-06-15 PROCEDURE — 84484 ASSAY OF TROPONIN QUANT: CPT | Performed by: EMERGENCY MEDICINE

## 2025-06-15 PROCEDURE — 74177 CT ABD & PELVIS W/CONTRAST: CPT

## 2025-06-15 PROCEDURE — 96374 THER/PROPH/DIAG INJ IV PUSH: CPT | Performed by: EMERGENCY MEDICINE

## 2025-06-15 PROCEDURE — 2550000001 HC RX 255 CONTRASTS: Performed by: EMERGENCY MEDICINE

## 2025-06-15 PROCEDURE — 80053 COMPREHEN METABOLIC PANEL: CPT | Performed by: EMERGENCY MEDICINE

## 2025-06-15 PROCEDURE — 87086 URINE CULTURE/COLONY COUNT: CPT | Performed by: EMERGENCY MEDICINE

## 2025-06-15 PROCEDURE — 70450 CT HEAD/BRAIN W/O DYE: CPT

## 2025-06-15 PROCEDURE — 96361 HYDRATE IV INFUSION ADD-ON: CPT | Performed by: EMERGENCY MEDICINE

## 2025-06-15 PROCEDURE — 83735 ASSAY OF MAGNESIUM: CPT | Performed by: EMERGENCY MEDICINE

## 2025-06-15 PROCEDURE — 70450 CT HEAD/BRAIN W/O DYE: CPT | Performed by: RADIOLOGY

## 2025-06-15 PROCEDURE — 84100 ASSAY OF PHOSPHORUS: CPT | Performed by: EMERGENCY MEDICINE

## 2025-06-15 RX ORDER — PROCHLORPERAZINE EDISYLATE 5 MG/ML
10 INJECTION INTRAMUSCULAR; INTRAVENOUS ONCE
Status: COMPLETED | OUTPATIENT
Start: 2025-06-15 | End: 2025-06-15

## 2025-06-15 RX ORDER — PROCHLORPERAZINE MALEATE 10 MG
5 TABLET ORAL EVERY 6 HOURS PRN
Qty: 20 TABLET | Refills: 0 | Status: SHIPPED | OUTPATIENT
Start: 2025-06-15

## 2025-06-15 RX ADMIN — IOHEXOL 80 ML: 350 INJECTION, SOLUTION INTRAVENOUS at 07:42

## 2025-06-15 RX ADMIN — SODIUM CHLORIDE, SODIUM LACTATE, POTASSIUM CHLORIDE, AND CALCIUM CHLORIDE 500 ML: 600; 310; 30; 20 INJECTION, SOLUTION INTRAVENOUS at 03:09

## 2025-06-15 RX ADMIN — PROCHLORPERAZINE EDISYLATE 10 MG: 5 INJECTION INTRAMUSCULAR; INTRAVENOUS at 02:50

## 2025-06-15 NOTE — DISCHARGE INSTRUCTIONS
Please follow-up with your primary doctor.  I have sent a prescription for Compazine to your pharmacy.  Please also follow-up to obtain your upper and lower GI scopes.  Return to the emergency department for new or worsening symptoms or any other concerns.

## 2025-06-15 NOTE — ED TRIAGE NOTES
"Patient comes in endorsing nausea for the past 3 days as well as back pain; denies any abdominal pain; states she was throwing up previously, but has not today; also endorses feeling dehydrated and weak; family member at bedside endorses loss of appetite as well as \"cloudy\" memory; states she is scheduled to have a colonoscopy soon; denies any changes to bowel or bladder; denies CP or SOB;  Hx of CAD, HLD, HTN, IBS, scoliosis, chronic back pain; Patient alert and oriented x4 in triage with PERRLA; ambulates with steady, unlabored gait  "

## 2025-06-15 NOTE — ED PROVIDER NOTES
"History of Present Illness     History provided by: Patient and Family Member  Limitations to History: Altered Mental Status  External Records Reviewed with Brief Summary: None    HPI:  Dinora Elias is a 70 y.o. female with a significant past medical history of scoliosis with chronic pain, osteoporosis, colitis, dry mouth, and HTN presenting with 2  month history of intermittent nausea, loss of appetite, and fatigue lasting for days at a time. When she is feeling nauseous, she is unable to keep solids or liquids down. Zofran does not improve her nausea. Denies vomiting prior, endorses one episode of retching (no vomitus d/t empty stomach). She endorses unintentional weight loss. Endorses dysphagia described \"swallowing sandpaper\"; denies choking on food. She has an EGD scheduled for this with GI as well as a colonoscopy. She reports easily bruising. She thinks this is new, not able to say when onset.     Her two daughters are present and help fill in history. The brought the patient in to the ER because today they found her disheveled and unkempt which is very atypical. The say the patient's memory has become increasingly worse. She has to now write things down to remember (e.g. when to take her medicines).     She denies abdominal pain, fever, chills, SOB, CP, numbness/tingling, changes to bowel/bladder habits, depression, anhedonia, SI/HI.    Physical Exam   Triage vitals:  T 35.4 °C (95.7 °F)  HR 94  /81  RR 16  O2 98 % None (Room air)    Physical Exam  Constitutional:       Comments: Thin, muscle wasting   HENT:      Head: Normocephalic.      Mouth/Throat:      Mouth: Mucous membranes are dry.      Pharynx: Oropharynx is clear. No oropharyngeal exudate or posterior oropharyngeal erythema.      Comments: Prominent papillea on back of tongue  Eyes:      General: No scleral icterus.     Pupils: Pupils are equal, round, and reactive to light.   Cardiovascular:      Rate and Rhythm: Normal rate and " regular rhythm.      Heart sounds: No murmur heard.     No friction rub. No gallop.   Pulmonary:      Effort: Pulmonary effort is normal.      Breath sounds: No wheezing, rhonchi or rales.   Abdominal:      General: Abdomen is flat. There is no distension.      Palpations: Abdomen is soft.      Tenderness: There is no abdominal tenderness. There is no guarding or rebound.   Musculoskeletal:      Comments: Kyphoscoliosis   Skin:     General: Skin is warm and dry.      Capillary Refill: Capillary refill takes less than 2 seconds.      Findings: Bruising (substantial dark red bruising on forearms. No petechia) present.   Neurological:      Mental Status: She is alert and oriented to person, place, and time.   Psychiatric:         Attention and Perception: Attention normal.         Mood and Affect: Affect normal.         Behavior: Behavior is cooperative.         Thought Content: Thought content normal.         Cognition and Memory: Cognition normal.      Comments: No SI/HI          Medical Decision Making & ED Course   Medical Decision Makin y.o. female presenting with a two month history of fatigue, nausea, anorexia, unintentional weight loss, and reported progressive cognitive changes concerning for malignancy. Provided 500ml LR bolus due to inability to tolerate oral liquids. For nausea, compazine 10mg with improvement. Ordered CBC with diff and CT Abd/pel.    CBC with no leukocytosis, no anemia, and no thrombocytopenia. Other workup included CMP, phos, and mg due to anorexia and confusion reported by daughters which was reassuring for no electrolyte derangements. Low concern for ACS, so ordered troponin which was not elevated. AMS in elderly woman, so ordered UA with microscopy culture. UA showed some leuk esterase, but no WBC.    ----      Differential diagnoses considered include but are not limited to: nausea, malnutrition, chronic pain, malignancy     Social Determinants of Health which Significantly  Impact Care: None identified The following actions were taken to address these social determinants: nonw    EKG Independent Interpretation: EKG interpreted by myself. Please see ED Course for full interpretation.    Independent Result Review and Interpretation: Relevant laboratory and radiographic results were reviewed and independently interpreted by myself.  As necessary, they are commented on in the ED Course.    Chronic conditions affecting the patient's care: As documented above in [facility]    The patient was discussed with the following consultants/services: None    Care Considerations: As documented above in [facility]    ED Course:     Disposition   Sign out to morning team pending CT chest/ab/pel, likely discharge.    Procedures   Procedures    Patient seen and discussed with ED attending physician.    [facility]  Emergency Medicine      [facility]  06/15/25 1755

## 2025-06-15 NOTE — PROGRESS NOTES
Emergency Department Transition of Care Note       Signout   I received Dinora Elias in signout from Dr. Hernandez.  Please see the previous note for all HPI, PE and MDM up to the time of signout at 0700.    In brief Dinora Elias is an 70 y.o. female presenting for   Chief Complaint   Patient presents with    Nausea    Back Pain           ED Course & Medical Decision Making   At the time of signout, the patient's disposition is pending CT head, CT chest abdomen pelvis, and urinalysis.  This is a 70-year-old female who presents with fatigue, nausea and possible weight loss.  Workup initially by the previous provider team so far unremarkable.  Urinalysis without signs of urinary tract infection.  CT of the head negative for acute intracranial pathology.  CT of the chest abdomen pelvis negative for any acute findings.  There are pulmonary nodules that appear stable from prior CTs and no further evaluation or follow-up required.  Had discussion with patient and her family at the bedside.  Patient feels comfortable returning home.  Will provide a prescription for Compazine for nausea.  Recommended that she follow-up with her primary doctor in the next 1 to 2 weeks for reevaluation and further management of her fatigue.  Also recommended that she continue following up with GI for upper and lower GI scopes.  Patient expressed understanding and agreed with the plan.  Overall remained stable and is discharged in good condition.    ED Course:  ED Course as of 06/15/25 1043   Sun Collin 15, 2025   0807 CT head wo IV contrast  No acute intracranial normality.  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  MRI recommended for further evaluation as needed. [VM]   0940 Thyroid Stimulating Hormone: 0.68 [VM]   0941 CT chest abdomen pelvis w IV contrast  IMPRESSION:  1. No acute abnormality in the chest, abdomen, or pelvis.  2. Few solid pulmonary nodules throughout the right lower lobe have  been stable since remote CT from  01/31/2023, favoring benign etiology  and requires no further follow up.   [VM]      ED Course User Index  [VM] Onel Nathan DO         Diagnoses as of 06/15/25 1043   Nausea and vomiting, unspecified vomiting type   Fatigue, unspecified type       Patient seen by and discussed with the attending emergency medicine physician.     Disposition   As a result of the work-up, the patient was discharged home.  she was informed of her diagnosis and instructed to come back with any concerns or worsening of condition.  she and was agreeable to the plan as discussed above.  she was given the opportunity to ask questions.  All of the patient's questions were answered.    Procedures   Procedures    Patient seen and discussed with ED attending physician.    Onel Nathan DO  Emergency Medicine PGY-3  [facility]

## 2025-06-16 LAB — BACTERIA UR CULT: NORMAL

## 2025-06-19 ENCOUNTER — APPOINTMENT (OUTPATIENT)
Dept: GASTROENTEROLOGY | Facility: HOSPITAL | Age: 70
End: 2025-06-19
Payer: MEDICARE

## 2025-06-23 RX ORDER — TRAMADOL HYDROCHLORIDE 50 MG/1
50 TABLET, FILM COATED ORAL 3 TIMES DAILY PRN
Qty: 21 TABLET | Refills: 0 | Status: SHIPPED | OUTPATIENT
Start: 2025-06-23 | End: 2025-06-30

## 2025-07-03 DIAGNOSIS — M54.2 CERVICALGIA: ICD-10-CM

## 2025-07-03 DIAGNOSIS — G89.29 CHRONIC THORACIC BACK PAIN, UNSPECIFIED BACK PAIN LATERALITY: ICD-10-CM

## 2025-07-03 DIAGNOSIS — E55.9 VITAMIN D DEFICIENCY: ICD-10-CM

## 2025-07-03 DIAGNOSIS — M54.6 CHRONIC THORACIC BACK PAIN, UNSPECIFIED BACK PAIN LATERALITY: ICD-10-CM

## 2025-07-03 DIAGNOSIS — M54.50 LUMBAR PAIN: ICD-10-CM

## 2025-07-03 RX ORDER — TRAMADOL HYDROCHLORIDE 50 MG/1
50 TABLET, FILM COATED ORAL 3 TIMES DAILY PRN
Qty: 21 TABLET | Refills: 0 | Status: SHIPPED | OUTPATIENT
Start: 2025-07-03 | End: 2025-07-10

## 2025-07-03 RX ORDER — TIZANIDINE 2 MG/1
2 TABLET ORAL EVERY 8 HOURS PRN
Qty: 90 TABLET | Refills: 0 | Status: SHIPPED | OUTPATIENT
Start: 2025-07-03

## 2025-07-03 RX ORDER — VIT C/E/ZN/COPPR/LUTEIN/ZEAXAN 250MG-90MG
125 CAPSULE ORAL DAILY
Qty: 90 CAPSULE | Refills: 1 | Status: SHIPPED | OUTPATIENT
Start: 2025-07-03

## 2025-07-03 RX ORDER — GABAPENTIN 600 MG/1
600 TABLET ORAL 3 TIMES DAILY
Qty: 90 TABLET | Refills: 5 | Status: SHIPPED | OUTPATIENT
Start: 2025-07-03 | End: 2026-07-03

## 2025-07-11 ENCOUNTER — APPOINTMENT (OUTPATIENT)
Facility: HOSPITAL | Age: 70
End: 2025-07-11
Payer: MEDICARE

## 2025-07-14 DIAGNOSIS — M54.6 CHRONIC THORACIC BACK PAIN, UNSPECIFIED BACK PAIN LATERALITY: ICD-10-CM

## 2025-07-14 DIAGNOSIS — G89.29 CHRONIC THORACIC BACK PAIN, UNSPECIFIED BACK PAIN LATERALITY: ICD-10-CM

## 2025-07-14 RX ORDER — TRAMADOL HYDROCHLORIDE 50 MG/1
50 TABLET, FILM COATED ORAL 3 TIMES DAILY PRN
Qty: 21 TABLET | Refills: 0 | Status: SHIPPED | OUTPATIENT
Start: 2025-07-14 | End: 2025-07-23

## 2025-07-15 ENCOUNTER — CLINICAL SUPPORT (OUTPATIENT)
Dept: SLEEP MEDICINE | Facility: CLINIC | Age: 70
End: 2025-07-15
Payer: MEDICARE

## 2025-07-15 DIAGNOSIS — R29.818 SUSPECTED SLEEP APNEA: ICD-10-CM

## 2025-07-16 NOTE — PROGRESS NOTES
[facility] TECH NOTE:     Patient: Dinora Elias   MRN//AGE: [id number]  1955  70 y.o.   Technologist: RYLAND Wyman   Room: 105   Service Date: 7/15/2025        Sleep Testing Location: [facility]:     TECHNOLOGIST SLEEP STUDY PROCEDURE NOTE:   This sleep study is being conducted according to the policies and procedures outlined by the [employer] accreditation standards.  The sleep study procedure and processes involved during this appointment was explained to the patient/patient’s family, questions were answered. The patient/family verbalized understanding.      The patient is a 70 y.o. year old female scheduled for adiagnostic PSG. with montage of: diagnostic PSG. she arrived for her appointment at 1909.    The study that was ultimately completed was adiagnostic PSG.     The full study Was completed.  Patient questionnaires completed?: yes     Consents signed? yes    Initial Fall Risk Screening:     Dinora has not fallen in the last 6 months.  Dinora does not have a fear of falling. She does not need assistance with sitting, standing, or walking. she does not need assistance walking in her home. she does not need assistance in an unfamiliar setting. The patient is not using an assistive device.     Brief Study observations: Patient had a very difficult time falling asleep. She needed to use bathroom over 7 times. Once she finally fell asleep she almost immediately went into REM sleep and began having frequent PVC's. Patient had an high AHI as well. These 2 factors qualified study for priority scoring.     Deviation to order/protocol and reason: no      If PAP, which was preferred mask/pressure/mode: n/a      Other:None    After the procedure, the patient/family was informed to ensure followup with ordering clinician for testing results.      Technologist: RYLAND Wyman

## 2025-07-30 ENCOUNTER — APPOINTMENT (OUTPATIENT)
Dept: GASTROENTEROLOGY | Facility: EXTERNAL LOCATION | Age: 70
End: 2025-07-30
Payer: MEDICARE